# Patient Record
Sex: FEMALE | Race: WHITE | NOT HISPANIC OR LATINO | Employment: OTHER | URBAN - METROPOLITAN AREA
[De-identification: names, ages, dates, MRNs, and addresses within clinical notes are randomized per-mention and may not be internally consistent; named-entity substitution may affect disease eponyms.]

---

## 2017-03-09 ENCOUNTER — HOSPITAL ENCOUNTER (OUTPATIENT)
Dept: LAB | Facility: HOSPITAL | Age: 67
Discharge: HOME | End: 2017-03-09
Attending: INTERNAL MEDICINE | Admitting: INTERNAL MEDICINE

## 2017-03-09 LAB
ALBUMIN SERPL-MCNC: 3.7 G/DL (ref 3.5–4.8)
ALBUMIN/GLOB SERPL: 1.3 {RATIO} (ref 1–1.7)
ALP SERPL-CCNC: 56 IU/L (ref 32–91)
ALT SERPL-CCNC: 13 IU/L (ref 14–54)
ANION GAP SERPL CALC-SCNC: 11.5 MMOL/L (ref 10–20)
AST SERPL-CCNC: 16 IU/L (ref 15–41)
BASOPHILS # BLD AUTO: 0.1 10*3/UL (ref 0–0.2)
BASOPHILS NFR BLD AUTO: 1 % (ref 0–2)
BILIRUB SERPL-MCNC: 0.8 MG/DL (ref 0.3–1.2)
BILIRUB UR QL STRIP: NEGATIVE MG/DL
BUN SERPL-MCNC: 15 MG/DL (ref 8–20)
BUN/CREAT SERPL: 16.7 (ref 5.4–26.2)
CALCIUM SERPL-MCNC: 9.5 MG/DL (ref 8.9–10.3)
CASTS URNS QL MICRO: ABNORMAL /[LPF]
CHLORIDE SERPL-SCNC: 106 MMOL/L (ref 101–111)
COLOR UR: YELLOW
CONV BACTERIA IN URINE MICRO: NEGATIVE
CONV CLARITY OF URINE: CLEAR
CONV CO2: 26 MMOL/L (ref 22–32)
CONV HYALINE CASTS IN URINE MICRO: 1 /[LPF] (ref 0–5)
CONV PROTEIN IN URINE BY AUTOMATED TEST STRIP: NEGATIVE MG/DL
CONV SMALL ROUND CELLS: ABNORMAL /[HPF]
CONV TOTAL PROTEIN: 6.5 G/DL (ref 6.1–7.9)
CONV UROBILINOGEN IN URINE BY AUTOMATED TEST STRIP: 0.2 MG/DL
CREAT UR-MCNC: 0.9 MG/DL (ref 0.4–1)
CRP SERPL-MCNC: 0.08 MG/DL (ref 0–0.7)
CULTURE INDICATED?: ABNORMAL
DIFFERENTIAL METHOD BLD: (no result)
EOSINOPHIL # BLD AUTO: 0.3 10*3/UL (ref 0–0.3)
EOSINOPHIL # BLD AUTO: 4 % (ref 0–3)
ERYTHROCYTE [DISTWIDTH] IN BLOOD BY AUTOMATED COUNT: 13.6 % (ref 11.5–14.5)
ERYTHROCYTE [SEDIMENTATION RATE] IN BLOOD BY WESTERGREN METHOD: 18 MM/HR (ref 0–30)
GLOBULIN UR ELPH-MCNC: 2.8 G/DL (ref 2.5–3.8)
GLUCOSE SERPL-MCNC: 111 MG/DL (ref 65–99)
GLUCOSE UR QL: NEGATIVE MG/DL
HCT VFR BLD AUTO: 38.7 % (ref 35–49)
HGB BLD-MCNC: 13.2 G/DL (ref 12–15)
HGB UR QL STRIP: NEGATIVE
KETONES UR QL STRIP: NEGATIVE MG/DL
LEUKOCYTE ESTERASE UR QL STRIP: ABNORMAL
LYMPHOCYTES # BLD AUTO: 2.1 10*3/UL (ref 0.8–4.8)
LYMPHOCYTES NFR BLD AUTO: 26 % (ref 18–42)
MCH RBC QN AUTO: 29.4 PG (ref 26–32)
MCHC RBC AUTO-ENTMCNC: 34.1 G/DL (ref 32–36)
MCV RBC AUTO: 86 FL (ref 80–94)
MONOCYTES # BLD AUTO: 0.8 10*3/UL (ref 0.1–1.3)
MONOCYTES NFR BLD AUTO: 9 % (ref 2–11)
NEUTROPHILS # BLD AUTO: 4.8 10*3/UL (ref 2.3–8.6)
NEUTROPHILS NFR BLD AUTO: 60 % (ref 50–75)
NITRITE UR QL STRIP: NEGATIVE
NRBC BLD AUTO-RTO: 0 /100{WBCS}
NRBC/RBC NFR BLD MANUAL: 0 10*3/UL
PH UR STRIP.AUTO: 5.5 [PH] (ref 4.5–8)
PLATELET # BLD AUTO: 234 10*3/UL (ref 150–450)
PMV BLD AUTO: 8.3 FL (ref 7.4–10.4)
POTASSIUM SERPL-SCNC: 4.5 MMOL/L (ref 3.6–5.1)
RBC # BLD AUTO: 4.5 10*6/UL (ref 4–5.4)
RBC #/AREA URNS HPF: 1 /[HPF] (ref 0–3)
SODIUM SERPL-SCNC: 139 MMOL/L (ref 136–144)
SP GR UR: 1.02 (ref 1–1.03)
SPERM URNS QL MICRO: ABNORMAL /[HPF]
SQUAMOUS SPT QL MICRO: 2 /[HPF] (ref 0–5)
UNIDENT CRYS URNS QL MICRO: ABNORMAL /[HPF]
WBC # BLD AUTO: 8.1 10*3/UL (ref 4.5–11.5)
WBC #/AREA URNS HPF: 3 /[HPF] (ref 0–5)
YEAST SPEC QL WET PREP: ABNORMAL /[HPF]

## 2017-06-07 ENCOUNTER — HOSPITAL ENCOUNTER (OUTPATIENT)
Dept: LAB | Facility: HOSPITAL | Age: 67
Discharge: HOME | End: 2017-06-07
Attending: INTERNAL MEDICINE | Admitting: INTERNAL MEDICINE

## 2017-06-09 LAB — PTH-INTACT SERPL-MCNC: 43 PG/ML (ref 11–72)

## 2017-07-26 ENCOUNTER — HOSPITAL ENCOUNTER (OUTPATIENT)
Dept: MAMMOGRAPHY | Facility: HOSPITAL | Age: 67
Discharge: HOME | End: 2017-07-26
Attending: INTERNAL MEDICINE | Admitting: INTERNAL MEDICINE

## 2017-08-16 ENCOUNTER — HOSPITAL ENCOUNTER (OUTPATIENT)
Dept: SLEEP MEDICINE | Facility: HOSPITAL | Age: 67
Discharge: HOME | End: 2017-08-16
Attending: INTERNAL MEDICINE | Admitting: INTERNAL MEDICINE

## 2018-01-01 ENCOUNTER — HOSPITAL ENCOUNTER (OUTPATIENT)
Dept: MRI IMAGING | Facility: HOSPITAL | Age: 68
Discharge: HOME | End: 2018-12-21
Attending: DERMATOLOGY | Admitting: DERMATOLOGY

## 2018-01-01 ENCOUNTER — HOSPITAL ENCOUNTER (OUTPATIENT)
Dept: SLEEP MEDICINE | Facility: HOSPITAL | Age: 68
Discharge: HOME | End: 2018-08-27
Attending: INTERNAL MEDICINE | Admitting: INTERNAL MEDICINE

## 2018-01-01 ENCOUNTER — HOSPITAL ENCOUNTER (OUTPATIENT)
Dept: ULTRASOUND IMAGING | Facility: HOSPITAL | Age: 68
Discharge: HOME | End: 2018-10-19
Attending: DERMATOLOGY | Admitting: DERMATOLOGY

## 2018-01-01 LAB — CREAT BLDA-MCNC: 1 MG/DL (ref 0.6–1.3)

## 2018-02-21 ENCOUNTER — HOSPITAL ENCOUNTER (OUTPATIENT)
Dept: SLEEP MEDICINE | Facility: HOSPITAL | Age: 68
Discharge: HOME | End: 2018-02-21
Attending: INTERNAL MEDICINE | Admitting: INTERNAL MEDICINE

## 2019-01-01 ENCOUNTER — HOSPITAL ENCOUNTER (INPATIENT)
Facility: HOSPITAL | Age: 69
LOS: 20 days | End: 2019-08-02
Attending: EMERGENCY MEDICINE | Admitting: INTERNAL MEDICINE

## 2019-01-01 ENCOUNTER — APPOINTMENT (OUTPATIENT)
Dept: GENERAL RADIOLOGY | Facility: HOSPITAL | Age: 69
End: 2019-01-01

## 2019-01-01 ENCOUNTER — PREP FOR SURGERY (OUTPATIENT)
Dept: OTHER | Facility: HOSPITAL | Age: 69
End: 2019-01-01

## 2019-01-01 ENCOUNTER — TREATMENT (OUTPATIENT)
Dept: INTERNAL MEDICINE | Facility: HOSPITAL | Age: 69
End: 2019-01-01

## 2019-01-01 ENCOUNTER — HOSPITAL ENCOUNTER (OUTPATIENT)
Dept: GENERAL RADIOLOGY | Facility: HOSPITAL | Age: 69
Discharge: HOME | End: 2019-07-05
Admitting: FAMILY MEDICINE

## 2019-01-01 ENCOUNTER — TRANSCRIBE ORDERS (OUTPATIENT)
Dept: ADMINISTRATIVE | Facility: HOSPITAL | Age: 69
End: 2019-01-01

## 2019-01-01 ENCOUNTER — ANESTHESIA EVENT (OUTPATIENT)
Dept: GASTROENTEROLOGY | Facility: HOSPITAL | Age: 69
End: 2019-01-01

## 2019-01-01 ENCOUNTER — HOSPITAL ENCOUNTER (OUTPATIENT)
Dept: HOME HEALTH SERVICES | Facility: HOME HEALTHCARE | Age: 69
Setting detail: SPECIMEN
Discharge: HOME | End: 2019-06-07
Attending: ORTHOPAEDIC SURGERY | Admitting: ORTHOPAEDIC SURGERY

## 2019-01-01 ENCOUNTER — HOSPITAL ENCOUNTER (INPATIENT)
Dept: CARDIOLOGY | Facility: HOSPITAL | Age: 69
Discharge: HOME | End: 2019-07-14

## 2019-01-01 ENCOUNTER — HOSPITAL ENCOUNTER (OUTPATIENT)
Dept: ORTHOPEDIC SURGERY | Facility: CLINIC | Age: 69
Discharge: HOME | End: 2019-06-13
Attending: PHYSICIAN ASSISTANT | Admitting: PHYSICIAN ASSISTANT

## 2019-01-01 ENCOUNTER — LAB (OUTPATIENT)
Dept: LAB | Facility: HOSPITAL | Age: 69
End: 2019-01-01

## 2019-01-01 ENCOUNTER — CONVERSION ENCOUNTER (OUTPATIENT)
Dept: ORTHOPEDIC SURGERY | Facility: CLINIC | Age: 69
End: 2019-01-01

## 2019-01-01 ENCOUNTER — OFFICE VISIT (OUTPATIENT)
Dept: ORTHOPEDIC SURGERY | Facility: CLINIC | Age: 69
End: 2019-01-01

## 2019-01-01 ENCOUNTER — HOSPITAL ENCOUNTER (OUTPATIENT)
Dept: CT IMAGING | Facility: HOSPITAL | Age: 69
Discharge: HOME | End: 2019-06-17
Admitting: FAMILY MEDICINE

## 2019-01-01 ENCOUNTER — ANESTHESIA (OUTPATIENT)
Dept: GASTROENTEROLOGY | Facility: HOSPITAL | Age: 69
End: 2019-01-01

## 2019-01-01 ENCOUNTER — HOSPITAL ENCOUNTER (OUTPATIENT)
Dept: LAB | Facility: HOSPITAL | Age: 69
Discharge: HOME | End: 2019-06-13
Attending: INTERNAL MEDICINE | Admitting: INTERNAL MEDICINE

## 2019-01-01 ENCOUNTER — DOCUMENTATION (OUTPATIENT)
Dept: INTERNAL MEDICINE | Facility: HOSPITAL | Age: 69
End: 2019-01-01

## 2019-01-01 ENCOUNTER — APPOINTMENT (OUTPATIENT)
Dept: INTERVENTIONAL RADIOLOGY/VASCULAR | Facility: HOSPITAL | Age: 69
End: 2019-01-01

## 2019-01-01 ENCOUNTER — HOSPITAL ENCOUNTER (OUTPATIENT)
Dept: LAB | Facility: HOSPITAL | Age: 69
Setting detail: SPECIMEN
Discharge: HOME | End: 2019-05-27
Attending: PHYSICIAN ASSISTANT | Admitting: PHYSICIAN ASSISTANT

## 2019-01-01 ENCOUNTER — APPOINTMENT (OUTPATIENT)
Dept: CT IMAGING | Facility: HOSPITAL | Age: 69
End: 2019-01-01

## 2019-01-01 ENCOUNTER — HOSPITAL ENCOUNTER (OUTPATIENT)
Dept: ORTHOPEDIC SURGERY | Facility: CLINIC | Age: 69
Discharge: HOME | End: 2019-05-07
Attending: ORTHOPAEDIC SURGERY | Admitting: ORTHOPAEDIC SURGERY

## 2019-01-01 ENCOUNTER — APPOINTMENT (OUTPATIENT)
Dept: ULTRASOUND IMAGING | Facility: HOSPITAL | Age: 69
End: 2019-01-01

## 2019-01-01 VITALS
WEIGHT: 201.94 LBS | HEIGHT: 65 IN | SYSTOLIC BLOOD PRESSURE: 178 MMHG | DIASTOLIC BLOOD PRESSURE: 86 MMHG | OXYGEN SATURATION: 96 % | HEART RATE: 127 BPM | TEMPERATURE: 97.2 F | RESPIRATION RATE: 24 BRPM | BODY MASS INDEX: 33.65 KG/M2

## 2019-01-01 VITALS
HEIGHT: 66 IN | DIASTOLIC BLOOD PRESSURE: 69 MMHG | WEIGHT: 216 LBS | HEART RATE: 68 BPM | SYSTOLIC BLOOD PRESSURE: 129 MMHG | BODY MASS INDEX: 34.72 KG/M2

## 2019-01-01 VITALS
BODY MASS INDEX: 34.72 KG/M2 | HEART RATE: 61 BPM | HEIGHT: 66 IN | WEIGHT: 216 LBS | SYSTOLIC BLOOD PRESSURE: 172 MMHG | DIASTOLIC BLOOD PRESSURE: 72 MMHG

## 2019-01-01 VITALS — WEIGHT: 227 LBS | HEIGHT: 67 IN | BODY MASS INDEX: 35.63 KG/M2

## 2019-01-01 VITALS
DIASTOLIC BLOOD PRESSURE: 66 MMHG | WEIGHT: 217 LBS | BODY MASS INDEX: 36.15 KG/M2 | HEIGHT: 65 IN | SYSTOLIC BLOOD PRESSURE: 146 MMHG

## 2019-01-01 VITALS
BODY MASS INDEX: 36 KG/M2 | HEIGHT: 66 IN | DIASTOLIC BLOOD PRESSURE: 76 MMHG | HEART RATE: 62 BPM | SYSTOLIC BLOOD PRESSURE: 146 MMHG | WEIGHT: 224 LBS

## 2019-01-01 DIAGNOSIS — J96.01 ACUTE HYPOXEMIC RESPIRATORY FAILURE (HCC): ICD-10-CM

## 2019-01-01 DIAGNOSIS — Z96.641 HX OF TOTAL HIP ARTHROPLASTY, RIGHT: Primary | ICD-10-CM

## 2019-01-01 DIAGNOSIS — R06.02 SOB (SHORTNESS OF BREATH): Primary | ICD-10-CM

## 2019-01-01 DIAGNOSIS — E66.9 OBESITY (BMI 30-39.9): ICD-10-CM

## 2019-01-01 DIAGNOSIS — R89.9 ABNORMAL LABORATORY TEST: ICD-10-CM

## 2019-01-01 DIAGNOSIS — R06.00 DYSPNEA, UNSPECIFIED TYPE: ICD-10-CM

## 2019-01-01 DIAGNOSIS — I20.9 ANGINA PECTORIS (HCC): ICD-10-CM

## 2019-01-01 DIAGNOSIS — J18.9 PNEUMONIA DUE TO INFECTIOUS ORGANISM, UNSPECIFIED LATERALITY, UNSPECIFIED PART OF LUNG: Primary | ICD-10-CM

## 2019-01-01 DIAGNOSIS — J18.9 PNEUMONIA DUE TO INFECTIOUS ORGANISM, UNSPECIFIED LATERALITY, UNSPECIFIED PART OF LUNG: ICD-10-CM

## 2019-01-01 DIAGNOSIS — D64.9 ANEMIA, UNSPECIFIED TYPE: Primary | ICD-10-CM

## 2019-01-01 DIAGNOSIS — D64.9 ANEMIA, UNSPECIFIED TYPE: ICD-10-CM

## 2019-01-01 DIAGNOSIS — T81.31XA DISRUPTION OF EXTERNAL SURGICAL WOUND, INITIAL ENCOUNTER: ICD-10-CM

## 2019-01-01 DIAGNOSIS — A41.9 SEVERE SEPSIS (HCC): Primary | ICD-10-CM

## 2019-01-01 DIAGNOSIS — R65.20 SEVERE SEPSIS (HCC): Primary | ICD-10-CM

## 2019-01-01 DIAGNOSIS — J81.0 ACUTE PULMONARY EDEMA (HCC): ICD-10-CM

## 2019-01-01 DIAGNOSIS — R06.00 DYSPNEA, UNSPECIFIED TYPE: Primary | ICD-10-CM

## 2019-01-01 DIAGNOSIS — T81.31XD DISRUPTION OF EXTERNAL SURGICAL WOUND, SUBSEQUENT ENCOUNTER: ICD-10-CM

## 2019-01-01 LAB
A FLAVUS AB SER QL ID: NEGATIVE
A FLAVUS AB SER QL ID: NEGATIVE
A FUMIGATUS AB SER QL ID: NEGATIVE
A FUMIGATUS AB SER QL ID: NEGATIVE
A NIGER AB SER QL ID: NEGATIVE
A NIGER AB SER QL ID: NEGATIVE
ABO + RH BLD: NORMAL
ABO GROUP BLD: NORMAL
ALBUMIN SERPL-MCNC: 2.4 G/DL (ref 3.5–4.8)
ALBUMIN SERPL-MCNC: 2.4 G/DL (ref 3.5–4.8)
ALBUMIN SERPL-MCNC: 2.5 G/DL (ref 3.5–4.8)
ALBUMIN SERPL-MCNC: 2.8 G/DL (ref 3.5–4.8)
ALBUMIN SERPL-MCNC: 2.8 G/DL (ref 3.5–4.8)
ALBUMIN SERPL-MCNC: 2.9 G/DL (ref 3.5–4.8)
ALBUMIN SERPL-MCNC: 3 G/DL (ref 3.5–4.8)
ALBUMIN SERPL-MCNC: 3.1 G/DL (ref 3.5–4.8)
ALBUMIN SERPL-MCNC: 3.2 G/DL (ref 3.5–4.8)
ALBUMIN SERPL-MCNC: 3.2 G/DL (ref 3.5–4.8)
ALBUMIN SERPL-MCNC: 3.3 G/DL (ref 3.5–4.8)
ALBUMIN SERPL-MCNC: 3.8 G/DL (ref 3.5–4.8)
ALBUMIN/GLOB SERPL: 0.8 G/DL (ref 1–1.7)
ALBUMIN/GLOB SERPL: 0.9 G/DL (ref 1–1.7)
ALBUMIN/GLOB SERPL: 1 G/DL (ref 1–1.7)
ALBUMIN/GLOB SERPL: 1.1 G/DL (ref 1–1.7)
ALBUMIN/GLOB SERPL: 1.3 G/DL (ref 1–1.7)
ALBUMIN/GLOB SERPL: 1.5 G/DL (ref 1–1.7)
ALBUMIN/GLOB SERPL: 1.6 G/DL (ref 1–1.7)
ALBUMIN/GLOB SERPL: 1.7 G/DL (ref 1–1.7)
ALBUMIN/GLOB SERPL: 1.8 G/DL (ref 1–1.7)
ALBUMIN/GLOB SERPL: 1.9 G/DL (ref 1–1.7)
ALBUMIN/GLOB SERPL: 2.1 G/DL (ref 1–1.7)
ALBUMIN/GLOB SERPL: 2.1 G/DL (ref 1–1.7)
ALBUMIN/GLOB SERPL: 2.5 G/DL (ref 1–1.7)
ALP SERPL-CCNC: 38 U/L (ref 32–91)
ALP SERPL-CCNC: 40 U/L (ref 32–91)
ALP SERPL-CCNC: 44 U/L (ref 32–91)
ALP SERPL-CCNC: 45 U/L (ref 32–91)
ALP SERPL-CCNC: 47 U/L (ref 32–91)
ALP SERPL-CCNC: 49 U/L (ref 32–91)
ALP SERPL-CCNC: 50 U/L (ref 32–91)
ALP SERPL-CCNC: 52 U/L (ref 32–91)
ALP SERPL-CCNC: 55 U/L (ref 32–91)
ALP SERPL-CCNC: 58 U/L (ref 32–91)
ALP SERPL-CCNC: 62 U/L (ref 32–91)
ALP SERPL-CCNC: 77 U/L (ref 32–91)
ALT SERPL W P-5'-P-CCNC: 13 U/L (ref 14–54)
ALT SERPL W P-5'-P-CCNC: 16 U/L (ref 14–54)
ALT SERPL W P-5'-P-CCNC: 16 U/L (ref 14–54)
ALT SERPL W P-5'-P-CCNC: 17 U/L (ref 14–54)
ALT SERPL W P-5'-P-CCNC: 18 U/L (ref 14–54)
ALT SERPL W P-5'-P-CCNC: 19 U/L (ref 14–54)
ALT SERPL W P-5'-P-CCNC: 19 U/L (ref 14–54)
ALT SERPL W P-5'-P-CCNC: 20 U/L (ref 14–54)
ALT SERPL W P-5'-P-CCNC: 22 U/L (ref 14–54)
ALT SERPL W P-5'-P-CCNC: 25 U/L (ref 14–54)
ALT SERPL W P-5'-P-CCNC: 25 U/L (ref 14–54)
ALT SERPL W P-5'-P-CCNC: 26 U/L (ref 14–54)
ALT SERPL W P-5'-P-CCNC: 27 U/L (ref 14–54)
ALT SERPL W P-5'-P-CCNC: 27 U/L (ref 14–54)
ALT SERPL W P-5'-P-CCNC: 30 U/L (ref 14–54)
ALT SERPL W P-5'-P-CCNC: 31 U/L (ref 14–54)
ANION GAP SERPL CALC-SCNC: 12.2 MMOL/L (ref 10–20)
ANION GAP SERPL CALC-SCNC: 14.2 MMOL/L (ref 10–20)
ANION GAP SERPL CALCULATED.3IONS-SCNC: 10.5 MMOL/L (ref 5–15)
ANION GAP SERPL CALCULATED.3IONS-SCNC: 11.3 MMOL/L (ref 5–15)
ANION GAP SERPL CALCULATED.3IONS-SCNC: 11.4 MMOL/L (ref 5–15)
ANION GAP SERPL CALCULATED.3IONS-SCNC: 11.5 MMOL/L (ref 5–15)
ANION GAP SERPL CALCULATED.3IONS-SCNC: 11.8 MMOL/L (ref 5–15)
ANION GAP SERPL CALCULATED.3IONS-SCNC: 12.5 MMOL/L (ref 5–15)
ANION GAP SERPL CALCULATED.3IONS-SCNC: 12.5 MMOL/L (ref 5–15)
ANION GAP SERPL CALCULATED.3IONS-SCNC: 12.7 MMOL/L (ref 5–15)
ANION GAP SERPL CALCULATED.3IONS-SCNC: 13.1 MMOL/L (ref 5–15)
ANION GAP SERPL CALCULATED.3IONS-SCNC: 13.6 MMOL/L (ref 5–15)
ANION GAP SERPL CALCULATED.3IONS-SCNC: 14.5 MMOL/L (ref 5–15)
ANION GAP SERPL CALCULATED.3IONS-SCNC: 14.6 MMOL/L (ref 5–15)
ANION GAP SERPL CALCULATED.3IONS-SCNC: 15.3 MMOL/L (ref 5–15)
ANION GAP SERPL CALCULATED.3IONS-SCNC: 15.7 MMOL/L (ref 5–15)
ANION GAP SERPL CALCULATED.3IONS-SCNC: 15.7 MMOL/L (ref 5–15)
ANION GAP SERPL CALCULATED.3IONS-SCNC: 15.9 MMOL/L (ref 5–15)
ANION GAP SERPL CALCULATED.3IONS-SCNC: 16.4 MMOL/L (ref 5–15)
ANION GAP SERPL CALCULATED.3IONS-SCNC: 16.9 MMOL/L (ref 5–15)
ANION GAP SERPL CALCULATED.3IONS-SCNC: 17.1 MMOL/L (ref 5–15)
ANION GAP SERPL CALCULATED.3IONS-SCNC: 17.7 MMOL/L (ref 5–15)
ANION GAP SERPL CALCULATED.3IONS-SCNC: 17.8 MMOL/L (ref 5–15)
ANISOCYTOSIS BLD QL: ABNORMAL
APTT PPP: 21.3 SECONDS (ref 24–31)
APTT PPP: 21.7 SECONDS (ref 24–31)
APTT PPP: 22.7 SECONDS (ref 24–31)
APTT PPP: 22.9 SECONDS (ref 24–31)
APTT PPP: 22.9 SECONDS (ref 24–31)
APTT PPP: 23.5 SECONDS (ref 24–31)
APTT PPP: 23.7 SECONDS (ref 24–31)
APTT PPP: 23.9 SECONDS (ref 24–31)
APTT PPP: 24.3 SECONDS (ref 24–31)
APTT PPP: 28.5 SECONDS (ref 24–31)
ARTERIAL PATENCY WRIST A: ABNORMAL
ARTERIAL PATENCY WRIST A: NORMAL
ARTERIAL PATENCY WRIST A: POSITIVE
AST SERPL-CCNC: 18 U/L (ref 15–41)
AST SERPL-CCNC: 20 U/L (ref 15–41)
AST SERPL-CCNC: 21 U/L (ref 15–41)
AST SERPL-CCNC: 21 U/L (ref 15–41)
AST SERPL-CCNC: 22 U/L (ref 15–41)
AST SERPL-CCNC: 23 U/L (ref 15–41)
AST SERPL-CCNC: 24 U/L (ref 15–41)
AST SERPL-CCNC: 26 U/L (ref 15–41)
AST SERPL-CCNC: 26 U/L (ref 15–41)
AST SERPL-CCNC: 29 U/L (ref 15–41)
AST SERPL-CCNC: 32 U/L (ref 15–41)
AST SERPL-CCNC: 33 U/L (ref 15–41)
AST SERPL-CCNC: 34 U/L (ref 15–41)
AST SERPL-CCNC: 38 U/L (ref 15–41)
AST SERPL-CCNC: 38 U/L (ref 15–41)
AST SERPL-CCNC: 45 U/L (ref 15–41)
ATMOSPHERIC PRESS: ABNORMAL MMHG
ATMOSPHERIC PRESS: NORMAL MMHG
B DERMAT AB TITR SER: NEGATIVE {TITER}
B DERMAT AB TITR SER: NEGATIVE {TITER}
B PERT DNA SPEC QL NAA+PROBE: NOT DETECTED
B PERT DNA SPEC QL NAA+PROBE: NOT DETECTED
BACTERIA SPEC AEROBE CULT: NORMAL
BACTERIA UR QL AUTO: ABNORMAL /HPF
BASE EXCESS BLDA CALC-SCNC: -1.9 MMOL/L (ref 0–3)
BASE EXCESS BLDA CALC-SCNC: -1.9 MMOL/L (ref 0–3)
BASE EXCESS BLDA CALC-SCNC: -2.2 MMOL/L (ref 0–3)
BASE EXCESS BLDA CALC-SCNC: -3.9 MMOL/L (ref 0–3)
BASE EXCESS BLDA CALC-SCNC: -4.2 MMOL/L (ref 0–3)
BASE EXCESS BLDA CALC-SCNC: -4.6 MMOL/L (ref 0–3)
BASE EXCESS BLDA CALC-SCNC: 18.9 MMOL/L (ref 0–3)
BASE EXCESS BLDA CALC-SCNC: 4 MMOL/L (ref 0–3)
BASE EXCESS BLDA CALC-SCNC: 4.7 MMOL/L (ref 0–3)
BASE EXCESS BLDA CALC-SCNC: 5.7 MMOL/L (ref 0–3)
BASE EXCESS BLDA CALC-SCNC: NORMAL MMOL/L (ref 0–3)
BASOPHILS # BLD AUTO: 0 10*3/MM3 (ref 0–0.2)
BASOPHILS # BLD AUTO: 0.1 10*3/MM3 (ref 0–0.2)
BASOPHILS NFR BLD AUTO: 0 % (ref 0–1.5)
BASOPHILS NFR BLD AUTO: 0.1 % (ref 0–1.5)
BASOPHILS NFR BLD AUTO: 0.2 % (ref 0–1.5)
BASOPHILS NFR BLD AUTO: 0.3 % (ref 0–1.5)
BASOPHILS NFR BLD AUTO: 0.4 % (ref 0–1.5)
BASOPHILS NFR BLD AUTO: 0.6 % (ref 0–1.5)
BASOPHILS NFR BLD AUTO: 0.8 % (ref 0–1.5)
BDY SITE: ABNORMAL
BDY SITE: NORMAL
BH BB BLOOD EXPIRATION DATE: NORMAL
BH BB BLOOD TYPE BARCODE: 600
BH BB BLOOD TYPE BARCODE: 6200
BH BB BLOOD TYPE BARCODE: NORMAL
BH BB DISPENSE STATUS: NORMAL
BH BB PRODUCT CODE: NORMAL
BH BB UNIT NUMBER: NORMAL
BH CV ECHO MEAS - ACS: 1.7 CM
BH CV ECHO MEAS - AO MAX PG (FULL): 3.5 MMHG
BH CV ECHO MEAS - AO MAX PG: 9.7 MMHG
BH CV ECHO MEAS - AO MEAN PG (FULL): 2.3 MMHG
BH CV ECHO MEAS - AO MEAN PG: 5.5 MMHG
BH CV ECHO MEAS - AO ROOT AREA (BSA CORRECTED): 1.6
BH CV ECHO MEAS - AO ROOT AREA: 8.8 CM^2
BH CV ECHO MEAS - AO ROOT DIAM: 3.3 CM
BH CV ECHO MEAS - AO V2 MAX: 155.8 CM/SEC
BH CV ECHO MEAS - AO V2 MEAN: 112.6 CM/SEC
BH CV ECHO MEAS - AO V2 VTI: 36.4 CM
BH CV ECHO MEAS - AORTIC HR: 69.8 BPM
BH CV ECHO MEAS - AORTIC R-R: 0.86 SEC
BH CV ECHO MEAS - AVA(I,A): 2.9 CM^2
BH CV ECHO MEAS - AVA(I,D): 2.9 CM^2
BH CV ECHO MEAS - AVA(V,A): 2.9 CM^2
BH CV ECHO MEAS - AVA(V,D): 2.9 CM^2
BH CV ECHO MEAS - BSA(HAYCOCK): 2.2 M^2
BH CV ECHO MEAS - BSA: 2 M^2
BH CV ECHO MEAS - BZI_BMI: 36.1 KILOGRAMS/M^2
BH CV ECHO MEAS - BZI_METRIC_HEIGHT: 165.1 CM
BH CV ECHO MEAS - BZI_METRIC_WEIGHT: 98.4 KG
BH CV ECHO MEAS - CI(AO): 10.9 L/MIN/M^2
BH CV ECHO MEAS - CI(LVOT): 3.6 L/MIN/M^2
BH CV ECHO MEAS - CO(AO): 22.3 L/MIN
BH CV ECHO MEAS - CO(LVOT): 7.3 L/MIN
BH CV ECHO MEAS - EDV(CUBED): 128.3 ML
BH CV ECHO MEAS - EDV(MOD-SP4): 108 ML
BH CV ECHO MEAS - EDV(TEICH): 120.7 ML
BH CV ECHO MEAS - EF(CUBED): 54.4 %
BH CV ECHO MEAS - EF(MOD-BP): 68 %
BH CV ECHO MEAS - EF(MOD-SP4): 68.1 %
BH CV ECHO MEAS - EF(TEICH): 46 %
BH CV ECHO MEAS - ESV(CUBED): 58.5 ML
BH CV ECHO MEAS - ESV(MOD-SP4): 34.5 ML
BH CV ECHO MEAS - ESV(TEICH): 65.2 ML
BH CV ECHO MEAS - FS: 23 %
BH CV ECHO MEAS - IVS/LVPW: 0.86
BH CV ECHO MEAS - IVSD: 0.77 CM
BH CV ECHO MEAS - LA DIMENSION(2D): 4.1 CM
BH CV ECHO MEAS - LV DIASTOLIC VOL/BSA (35-75): 52.7 ML/M^2
BH CV ECHO MEAS - LV MASS(C)D: 146.2 GRAMS
BH CV ECHO MEAS - LV MASS(C)DI: 71.4 GRAMS/M^2
BH CV ECHO MEAS - LV MAX PG: 6.2 MMHG
BH CV ECHO MEAS - LV MEAN PG: 3.2 MMHG
BH CV ECHO MEAS - LV SYSTOLIC VOL/BSA (12-30): 16.8 ML/M^2
BH CV ECHO MEAS - LV V1 MAX: 124.5 CM/SEC
BH CV ECHO MEAS - LV V1 MEAN: 82.2 CM/SEC
BH CV ECHO MEAS - LV V1 VTI: 28.5 CM
BH CV ECHO MEAS - LVIDD: 5 CM
BH CV ECHO MEAS - LVIDS: 3.9 CM
BH CV ECHO MEAS - LVOT AREA: 3.7 CM^2
BH CV ECHO MEAS - LVOT DIAM: 2.2 CM
BH CV ECHO MEAS - LVPWD: 0.9 CM
BH CV ECHO MEAS - MR MAX PG: 58.3 MMHG
BH CV ECHO MEAS - MR MAX VEL: 380.3 CM/SEC
BH CV ECHO MEAS - MV A MAX VEL: 48.6 CM/SEC
BH CV ECHO MEAS - MV DEC SLOPE: 456.7 CM/SEC^2
BH CV ECHO MEAS - MV DEC TIME: 0.23 SEC
BH CV ECHO MEAS - MV E MAX VEL: 106.4 CM/SEC
BH CV ECHO MEAS - MV E/A: 2.2
BH CV ECHO MEAS - MV MAX PG: 5.6 MMHG
BH CV ECHO MEAS - MV MEAN PG: 2 MMHG
BH CV ECHO MEAS - MV V2 MAX: 118.6 CM/SEC
BH CV ECHO MEAS - MV V2 MEAN: 65 CM/SEC
BH CV ECHO MEAS - MV V2 VTI: 32.4 CM
BH CV ECHO MEAS - MVA(VTI): 3.2 CM^2
BH CV ECHO MEAS - PA MAX PG (FULL): 2.4 MMHG
BH CV ECHO MEAS - PA MAX PG: 5.3 MMHG
BH CV ECHO MEAS - PA V2 MAX: 114.9 CM/SEC
BH CV ECHO MEAS - PULM A REVS DUR: 0.11 SEC
BH CV ECHO MEAS - PULM A REVS VEL: 28.1 CM/SEC
BH CV ECHO MEAS - PULM DIAS VEL: 90.4 CM/SEC
BH CV ECHO MEAS - PULM S/D: 0.68
BH CV ECHO MEAS - PULM SYS VEL: 61.4 CM/SEC
BH CV ECHO MEAS - PVA(V,A): 1.7 CM^2
BH CV ECHO MEAS - PVA(V,D): 1.7 CM^2
BH CV ECHO MEAS - QP/QS: 0.45
BH CV ECHO MEAS - RAP SYSTOLE: 3 MMHG
BH CV ECHO MEAS - RV MAX PG: 2.9 MMHG
BH CV ECHO MEAS - RV MEAN PG: 1.5 MMHG
BH CV ECHO MEAS - RV V1 MAX: 85.7 CM/SEC
BH CV ECHO MEAS - RV V1 MEAN: 56.8 CM/SEC
BH CV ECHO MEAS - RV V1 VTI: 20.5 CM
BH CV ECHO MEAS - RVDD: 3.2 CM
BH CV ECHO MEAS - RVOT AREA: 2.3 CM^2
BH CV ECHO MEAS - RVOT DIAM: 1.7 CM
BH CV ECHO MEAS - RVSP: 27.7 MMHG
BH CV ECHO MEAS - SI(AO): 156.4 ML/M^2
BH CV ECHO MEAS - SI(CUBED): 34.1 ML/M^2
BH CV ECHO MEAS - SI(LVOT): 51 ML/M^2
BH CV ECHO MEAS - SI(MOD-SP4): 35.9 ML/M^2
BH CV ECHO MEAS - SI(TEICH): 27.1 ML/M^2
BH CV ECHO MEAS - SV(AO): 320.3 ML
BH CV ECHO MEAS - SV(CUBED): 69.8 ML
BH CV ECHO MEAS - SV(LVOT): 104.4 ML
BH CV ECHO MEAS - SV(MOD-SP4): 73.5 ML
BH CV ECHO MEAS - SV(RVOT): 47.3 ML
BH CV ECHO MEAS - SV(TEICH): 55.5 ML
BH CV ECHO MEAS - TR MAX VEL: 248.7 CM/SEC
BILIRUB SERPL-MCNC: 0.8 MG/DL (ref 0.3–1.2)
BILIRUB SERPL-MCNC: 0.8 MG/DL (ref 0.3–1.2)
BILIRUB SERPL-MCNC: 1 MG/DL (ref 0.3–1.2)
BILIRUB SERPL-MCNC: 1.1 MG/DL (ref 0.3–1.2)
BILIRUB SERPL-MCNC: 1.3 MG/DL (ref 0.3–1.2)
BILIRUB SERPL-MCNC: 1.4 MG/DL (ref 0.3–1.2)
BILIRUB SERPL-MCNC: 1.4 MG/DL (ref 0.3–1.2)
BILIRUB SERPL-MCNC: 1.5 MG/DL (ref 0.3–1.2)
BILIRUB SERPL-MCNC: 1.5 MG/DL (ref 0.3–1.2)
BILIRUB SERPL-MCNC: 1.8 MG/DL (ref 0.3–1.2)
BILIRUB SERPL-MCNC: 2.2 MG/DL (ref 0.3–1.2)
BILIRUB SERPL-MCNC: 2.3 MG/DL (ref 0.3–1.2)
BILIRUB SERPL-MCNC: 2.7 MG/DL (ref 0.3–1.2)
BILIRUB UR QL STRIP: NEGATIVE
BILIRUB UR QL STRIP: NEGATIVE MG/DL
BLD GP AB SCN SERPL QL: NEGATIVE
BNP SERPL-MCNC: 369 PG/ML
BNP SERPL-MCNC: 441 PG/ML
BUN BLD-MCNC: 21 MG/DL (ref 8–20)
BUN BLD-MCNC: 21 MG/DL (ref 8–20)
BUN BLD-MCNC: 23 MG/DL (ref 8–20)
BUN BLD-MCNC: 29 MG/DL (ref 8–20)
BUN BLD-MCNC: 33 MG/DL (ref 8–20)
BUN BLD-MCNC: 38 MG/DL (ref 8–20)
BUN BLD-MCNC: 38 MG/DL (ref 8–20)
BUN BLD-MCNC: 43 MG/DL (ref 8–20)
BUN BLD-MCNC: 45 MG/DL (ref 8–20)
BUN BLD-MCNC: 48 MG/DL (ref 8–20)
BUN BLD-MCNC: 52 MG/DL (ref 8–20)
BUN BLD-MCNC: 52 MG/DL (ref 8–20)
BUN BLD-MCNC: 54 MG/DL (ref 8–20)
BUN BLD-MCNC: 55 MG/DL (ref 8–20)
BUN BLD-MCNC: 56 MG/DL (ref 8–20)
BUN BLD-MCNC: 61 MG/DL (ref 8–20)
BUN BLD-MCNC: 62 MG/DL (ref 8–20)
BUN BLD-MCNC: 64 MG/DL (ref 8–20)
BUN BLD-MCNC: 67 MG/DL (ref 8–20)
BUN BLD-MCNC: 79 MG/DL (ref 8–20)
BUN BLD-MCNC: 92 MG/DL (ref 8–20)
BUN SERPL-MCNC: 10 MG/DL (ref 8–20)
BUN SERPL-MCNC: 12 MG/DL (ref 8–20)
BUN/CREAT SERPL: 10 (ref 5.4–26.2)
BUN/CREAT SERPL: 15 (ref 5.4–26.2)
BUN/CREAT SERPL: 19.2 (ref 5.4–26.2)
BUN/CREAT SERPL: 20.7 (ref 5.4–26.2)
BUN/CREAT SERPL: 21 (ref 5.4–26.2)
BUN/CREAT SERPL: 21 (ref 5.4–26.2)
BUN/CREAT SERPL: 22 (ref 5.4–26.2)
BUN/CREAT SERPL: 25.3 (ref 5.4–26.2)
BUN/CREAT SERPL: 26 (ref 5.4–26.2)
BUN/CREAT SERPL: 28.1 (ref 5.4–26.2)
BUN/CREAT SERPL: 29.2 (ref 5.4–26.2)
BUN/CREAT SERPL: 30.5 (ref 5.4–26.2)
BUN/CREAT SERPL: 30.6 (ref 5.4–26.2)
BUN/CREAT SERPL: 31.1 (ref 5.4–26.2)
BUN/CREAT SERPL: 31.7 (ref 5.4–26.2)
BUN/CREAT SERPL: 34.3 (ref 5.4–26.2)
BUN/CREAT SERPL: 36 (ref 5.4–26.2)
BUN/CREAT SERPL: 36.7 (ref 5.4–26.2)
BUN/CREAT SERPL: 38.8 (ref 5.4–26.2)
BUN/CREAT SERPL: 39.4 (ref 5.4–26.2)
BUN/CREAT SERPL: 39.5 (ref 5.4–26.2)
BUN/CREAT SERPL: 40 (ref 5.4–26.2)
BUN/CREAT SERPL: 46 (ref 5.4–26.2)
C PNEUM DNA NPH QL NAA+NON-PROBE: NOT DETECTED
C PNEUM DNA NPH QL NAA+NON-PROBE: NOT DETECTED
C-ANCA TITR SER IF: ABNORMAL TITER
CA-I BLDA-SCNC: 0.89 MMOL/L (ref 1.15–1.33)
CA-I SERPL ISE-MCNC: 1.09 MMOL/L (ref 1.2–1.3)
CA-I SERPL ISE-MCNC: 1.09 MMOL/L (ref 1.2–1.3)
CA-I SERPL ISE-MCNC: 1.11 MMOL/L (ref 1.2–1.3)
CA-I SERPL ISE-MCNC: 1.12 MMOL/L (ref 1.2–1.3)
CA-I SERPL ISE-MCNC: 1.15 MMOL/L (ref 1.2–1.3)
CA-I SERPL ISE-MCNC: 1.18 MMOL/L (ref 1.2–1.3)
CA-I SERPL ISE-MCNC: 1.18 MMOL/L (ref 1.2–1.3)
CA-I SERPL ISE-MCNC: 1.21 MMOL/L (ref 1.2–1.3)
CA-I SERPL ISE-MCNC: 1.23 MMOL/L (ref 1.2–1.3)
CALCIUM SERPL-MCNC: 8.6 MG/DL (ref 8.9–10.3)
CALCIUM SERPL-MCNC: 8.6 MG/DL (ref 8.9–10.3)
CALCIUM SPEC-SCNC: 7.6 MG/DL (ref 8.9–10.3)
CALCIUM SPEC-SCNC: 7.7 MG/DL (ref 8.9–10.3)
CALCIUM SPEC-SCNC: 7.8 MG/DL (ref 8.9–10.3)
CALCIUM SPEC-SCNC: 8 MG/DL (ref 8.9–10.3)
CALCIUM SPEC-SCNC: 8.2 MG/DL (ref 8.9–10.3)
CALCIUM SPEC-SCNC: 8.3 MG/DL (ref 8.9–10.3)
CALCIUM SPEC-SCNC: 8.3 MG/DL (ref 8.9–10.3)
CALCIUM SPEC-SCNC: 8.5 MG/DL (ref 8.9–10.3)
CALCIUM SPEC-SCNC: 8.6 MG/DL (ref 8.9–10.3)
CALCIUM SPEC-SCNC: 8.7 MG/DL (ref 8.9–10.3)
CALCIUM SPEC-SCNC: 9.1 MG/DL (ref 8.9–10.3)
CASTS URNS QL MICRO: NORMAL /[LPF]
CHLORIDE SERPL-SCNC: 100 MMOL/L (ref 101–111)
CHLORIDE SERPL-SCNC: 101 MMOL/L (ref 101–111)
CHLORIDE SERPL-SCNC: 103 MMOL/L (ref 101–111)
CHLORIDE SERPL-SCNC: 103 MMOL/L (ref 101–111)
CHLORIDE SERPL-SCNC: 105 MMOL/L (ref 101–111)
CHLORIDE SERPL-SCNC: 105 MMOL/L (ref 101–111)
CHLORIDE SERPL-SCNC: 109 MMOL/L (ref 101–111)
CHLORIDE SERPL-SCNC: 109 MMOL/L (ref 101–111)
CHLORIDE SERPL-SCNC: 110 MMOL/L (ref 101–111)
CHLORIDE SERPL-SCNC: 110 MMOL/L (ref 101–111)
CHLORIDE SERPL-SCNC: 111 MMOL/L (ref 101–111)
CHLORIDE SERPL-SCNC: 112 MMOL/L (ref 101–111)
CHLORIDE SERPL-SCNC: 114 MMOL/L (ref 101–111)
CHLORIDE SERPL-SCNC: 89 MMOL/L (ref 101–111)
CHLORIDE SERPL-SCNC: 94 MMOL/L (ref 101–111)
CHLORIDE SERPL-SCNC: 96 MMOL/L (ref 101–111)
CHLORIDE SERPL-SCNC: 96 MMOL/L (ref 101–111)
CHLORIDE SERPL-SCNC: 98 MMOL/L (ref 101–111)
CHLORIDE SERPL-SCNC: 98 MMOL/L (ref 101–111)
CHLORIDE SERPL-SCNC: 99 MMOL/L (ref 101–111)
CHLORIDE SERPL-SCNC: 99 MMOL/L (ref 101–111)
CK SERPL-CCNC: 20 U/L (ref 38–234)
CK SERPL-CCNC: 47 U/L (ref 20–180)
CLARITY UR: CLEAR
CMV DNA SPEC QL NAA+PROBE: NEGATIVE
CMV IGM SERPL IA-ACNC: <30 AU/ML (ref 0–29.9)
CO2 BLDA-SCNC: 20.5 MMOL/L (ref 22–29)
CO2 BLDA-SCNC: 21.3 MMOL/L (ref 22–29)
CO2 BLDA-SCNC: 21.5 MMOL/L (ref 22–29)
CO2 BLDA-SCNC: 23.4 MMOL/L (ref 22–29)
CO2 BLDA-SCNC: 23.4 MMOL/L (ref 22–29)
CO2 BLDA-SCNC: 23.7 MMOL/L (ref 22–29)
CO2 BLDA-SCNC: 29.8 MMOL/L (ref 22–29)
CO2 BLDA-SCNC: 30.4 MMOL/L (ref 22–29)
CO2 BLDA-SCNC: 30.5 MMOL/L (ref 22–29)
CO2 BLDA-SCNC: 45 MMOL/L (ref 22–29)
CO2 SERPL-SCNC: 18 MMOL/L (ref 22–32)
CO2 SERPL-SCNC: 19 MMOL/L (ref 22–32)
CO2 SERPL-SCNC: 20 MMOL/L (ref 22–32)
CO2 SERPL-SCNC: 21 MMOL/L (ref 22–32)
CO2 SERPL-SCNC: 21 MMOL/L (ref 22–32)
CO2 SERPL-SCNC: 22 MMOL/L (ref 22–32)
CO2 SERPL-SCNC: 24 MMOL/L (ref 22–32)
CO2 SERPL-SCNC: 25 MMOL/L (ref 22–32)
CO2 SERPL-SCNC: 26 MMOL/L (ref 22–32)
CO2 SERPL-SCNC: 26 MMOL/L (ref 22–32)
CO2 SERPL-SCNC: 27 MMOL/L (ref 22–32)
CO2 SERPL-SCNC: 27 MMOL/L (ref 22–32)
CO2 SERPL-SCNC: 28 MMOL/L (ref 22–32)
CO2 SERPL-SCNC: 28 MMOL/L (ref 22–32)
CO2 SERPL-SCNC: 31 MMOL/L (ref 22–32)
CO2 SERPL-SCNC: 31 MMOL/L (ref 22–32)
CO2 SERPL-SCNC: 33 MMOL/L (ref 22–32)
CO2 SERPL-SCNC: 35 MMOL/L (ref 22–32)
CO2 SERPL-SCNC: 39 MMOL/L (ref 22–32)
COLOR UR: YELLOW
COLOR UR: YELLOW
CONV BACTERIA IN URINE MICRO: NEGATIVE
CONV CLARITY OF URINE: CLEAR
CONV CO2: 24 MMOL/L (ref 22–32)
CONV CO2: 24 MMOL/L (ref 22–32)
CONV HYALINE CASTS IN URINE MICRO: 0 /[LPF] (ref 0–5)
CONV PROTEIN IN URINE BY AUTOMATED TEST STRIP: NEGATIVE MG/DL
CONV SMALL ROUND CELLS: NORMAL /[HPF]
CONV UROBILINOGEN IN URINE BY AUTOMATED TEST STRIP: 0.2 MG/DL
CREAT BLD-MCNC: 1 MG/DL (ref 0.4–1)
CREAT BLD-MCNC: 1 MG/DL (ref 0.4–1)
CREAT BLD-MCNC: 1.2 MG/DL (ref 0.4–1)
CREAT BLD-MCNC: 1.2 MG/DL (ref 0.4–1)
CREAT BLD-MCNC: 1.3 MG/DL (ref 0.4–1)
CREAT BLD-MCNC: 1.4 MG/DL (ref 0.4–1)
CREAT BLD-MCNC: 1.4 MG/DL (ref 0.4–1)
CREAT BLD-MCNC: 1.5 MG/DL (ref 0.4–1)
CREAT BLD-MCNC: 1.6 MG/DL (ref 0.4–1)
CREAT BLD-MCNC: 1.7 MG/DL (ref 0.4–1)
CREAT BLD-MCNC: 1.8 MG/DL (ref 0.4–1)
CREAT BLD-MCNC: 2 MG/DL (ref 0.4–1)
CREAT UR-MCNC: 0.8 MG/DL (ref 0.4–1)
CREAT UR-MCNC: 1 MG/DL (ref 0.4–1)
CULTURE INDICATED?: NORMAL
D DIMER PPP FEU-MCNC: 2.73 MCGFEU/ML (ref 0.17–0.59)
D DIMER PPP FEU-MCNC: 4.46 MCGFEU/ML (ref 0.17–0.59)
D-LACTATE SERPL-SCNC: 1.2 MMOL/L (ref 0.5–2.2)
D-LACTATE SERPL-SCNC: 1.3 MMOL/L (ref 0.5–2.2)
D-LACTATE SERPL-SCNC: 1.9 MMOL/L (ref 0.5–2)
D-LACTATE SERPL-SCNC: 2.5 MMOL/L (ref 0.5–2)
DACRYOCYTES BLD QL SMEAR: ABNORMAL
DACRYOCYTES BLD QL SMEAR: ABNORMAL
DEPRECATED RDW RBC AUTO: 43.3 FL (ref 37–54)
DEPRECATED RDW RBC AUTO: 44.2 FL (ref 37–54)
DEPRECATED RDW RBC AUTO: 44.6 FL (ref 37–54)
DEPRECATED RDW RBC AUTO: 44.6 FL (ref 37–54)
DEPRECATED RDW RBC AUTO: 45.1 FL (ref 37–54)
DEPRECATED RDW RBC AUTO: 45.5 FL (ref 37–54)
DEPRECATED RDW RBC AUTO: 45.9 FL (ref 37–54)
DEPRECATED RDW RBC AUTO: 45.9 FL (ref 37–54)
DEPRECATED RDW RBC AUTO: 46.4 FL (ref 37–54)
DEPRECATED RDW RBC AUTO: 46.4 FL (ref 37–54)
DEPRECATED RDW RBC AUTO: 46.8 FL (ref 37–54)
DEPRECATED RDW RBC AUTO: 47.3 FL (ref 37–54)
DEPRECATED RDW RBC AUTO: 47.3 FL (ref 37–54)
DEPRECATED RDW RBC AUTO: 47.7 FL (ref 37–54)
DEPRECATED RDW RBC AUTO: 47.7 FL (ref 37–54)
DEPRECATED RDW RBC AUTO: 48.1 FL (ref 37–54)
DEPRECATED RDW RBC AUTO: 48.6 FL (ref 37–54)
DEPRECATED RDW RBC AUTO: 50.3 FL (ref 37–54)
EBV VCA IGM SER-ACNC: <36 U/ML (ref 0–35.9)
EOSINOPHIL # BLD AUTO: 0 10*3/MM3 (ref 0–0.4)
EOSINOPHIL # BLD AUTO: 0.1 10*3/MM3 (ref 0–0.4)
EOSINOPHIL # BLD AUTO: 0.5 10*3/MM3 (ref 0–0.4)
EOSINOPHIL NFR BLD AUTO: 0 % (ref 0.3–6.2)
EOSINOPHIL NFR BLD AUTO: 0.1 % (ref 0.3–6.2)
EOSINOPHIL NFR BLD AUTO: 0.2 % (ref 0.3–6.2)
EOSINOPHIL NFR BLD AUTO: 0.8 % (ref 0.3–6.2)
EOSINOPHIL NFR BLD AUTO: 1.9 % (ref 0.3–6.2)
EOSINOPHIL SPEC QL MICRO: 0 % EOS/100 CELLS (ref 0–0)
ERYTHROCYTE [DISTWIDTH] IN BLOOD BY AUTOMATED COUNT: 14.5 % (ref 12.3–15.4)
ERYTHROCYTE [DISTWIDTH] IN BLOOD BY AUTOMATED COUNT: 14.6 % (ref 12.3–15.4)
ERYTHROCYTE [DISTWIDTH] IN BLOOD BY AUTOMATED COUNT: 14.7 % (ref 12.3–15.4)
ERYTHROCYTE [DISTWIDTH] IN BLOOD BY AUTOMATED COUNT: 14.8 % (ref 12.3–15.4)
ERYTHROCYTE [DISTWIDTH] IN BLOOD BY AUTOMATED COUNT: 14.8 % (ref 12.3–15.4)
ERYTHROCYTE [DISTWIDTH] IN BLOOD BY AUTOMATED COUNT: 14.9 % (ref 12.3–15.4)
ERYTHROCYTE [DISTWIDTH] IN BLOOD BY AUTOMATED COUNT: 14.9 % (ref 12.3–15.4)
ERYTHROCYTE [DISTWIDTH] IN BLOOD BY AUTOMATED COUNT: 15 % (ref 12.3–15.4)
ERYTHROCYTE [DISTWIDTH] IN BLOOD BY AUTOMATED COUNT: 15.1 % (ref 12.3–15.4)
ERYTHROCYTE [DISTWIDTH] IN BLOOD BY AUTOMATED COUNT: 15.2 % (ref 12.3–15.4)
ERYTHROCYTE [DISTWIDTH] IN BLOOD BY AUTOMATED COUNT: 15.3 % (ref 12.3–15.4)
ERYTHROCYTE [DISTWIDTH] IN BLOOD BY AUTOMATED COUNT: 15.5 % (ref 12.3–15.4)
ERYTHROCYTE [DISTWIDTH] IN BLOOD BY AUTOMATED COUNT: 15.6 % (ref 12.3–15.4)
ERYTHROCYTE [DISTWIDTH] IN BLOOD BY AUTOMATED COUNT: 15.8 % (ref 12.3–15.4)
ERYTHROCYTE [DISTWIDTH] IN BLOOD BY AUTOMATED COUNT: 16 % (ref 12.3–15.4)
ERYTHROCYTE [DISTWIDTH] IN BLOOD BY AUTOMATED COUNT: 16.2 % (ref 12.3–15.4)
ERYTHROCYTE [DISTWIDTH] IN BLOOD BY AUTOMATED COUNT: 16.7 % (ref 12.3–15.4)
FERRITIN SERPL-MCNC: 218 NG/ML (ref 11–307)
FLUAV H1 2009 PAND RNA NPH QL NAA+PROBE: NOT DETECTED
FLUAV H1 2009 PAND RNA NPH QL NAA+PROBE: NOT DETECTED
FLUAV H1 HA GENE NPH QL NAA+PROBE: NOT DETECTED
FLUAV H1 HA GENE NPH QL NAA+PROBE: NOT DETECTED
FLUAV H3 RNA NPH QL NAA+PROBE: NOT DETECTED
FLUAV H3 RNA NPH QL NAA+PROBE: NOT DETECTED
FLUAV RNA SPEC QL NAA+PROBE: NEGATIVE
FLUAV SUBTYP SPEC NAA+PROBE: NOT DETECTED
FLUAV SUBTYP SPEC NAA+PROBE: NOT DETECTED
FLUBV RNA ISLT QL NAA+PROBE: NOT DETECTED
FLUBV RNA ISLT QL NAA+PROBE: NOT DETECTED
FLUBV RNA SPEC QL NAA+PROBE: NEGATIVE
FOLATE SERPL-MCNC: 6.5 NG/ML (ref 5.9–24.8)
GBM IGG SER-ACNC: 7 UNITS (ref 0–20)
GFR SERPL CREATININE-BSD FRML MDRD: 25 ML/MIN/1.73
GFR SERPL CREATININE-BSD FRML MDRD: 28 ML/MIN/1.73
GFR SERPL CREATININE-BSD FRML MDRD: 30 ML/MIN/1.73
GFR SERPL CREATININE-BSD FRML MDRD: 32 ML/MIN/1.73
GFR SERPL CREATININE-BSD FRML MDRD: 34 ML/MIN/1.73
GFR SERPL CREATININE-BSD FRML MDRD: 37 ML/MIN/1.73
GFR SERPL CREATININE-BSD FRML MDRD: 37 ML/MIN/1.73
GFR SERPL CREATININE-BSD FRML MDRD: 40 ML/MIN/1.73
GFR SERPL CREATININE-BSD FRML MDRD: 44 ML/MIN/1.73
GFR SERPL CREATININE-BSD FRML MDRD: 44 ML/MIN/1.73
GFR SERPL CREATININE-BSD FRML MDRD: 55 ML/MIN/1.73
GFR SERPL CREATININE-BSD FRML MDRD: 55 ML/MIN/1.73
GIANT PLATELETS: ABNORMAL
GIANT PLATELETS: NORMAL
GLOBULIN UR ELPH-MCNC: 1.5 GM/DL (ref 2.5–3.8)
GLOBULIN UR ELPH-MCNC: 1.6 GM/DL (ref 2.5–3.8)
GLOBULIN UR ELPH-MCNC: 1.6 GM/DL (ref 2.5–3.8)
GLOBULIN UR ELPH-MCNC: 1.7 GM/DL (ref 2.5–3.8)
GLOBULIN UR ELPH-MCNC: 1.9 GM/DL (ref 2.5–3.8)
GLOBULIN UR ELPH-MCNC: 2 GM/DL (ref 2.5–3.8)
GLOBULIN UR ELPH-MCNC: 2.1 GM/DL (ref 2.5–3.8)
GLOBULIN UR ELPH-MCNC: 2.3 GM/DL (ref 2.5–3.8)
GLOBULIN UR ELPH-MCNC: 2.4 GM/DL (ref 2.5–3.8)
GLOBULIN UR ELPH-MCNC: 2.6 GM/DL (ref 2.5–3.8)
GLOBULIN UR ELPH-MCNC: 2.7 GM/DL (ref 2.5–3.8)
GLOBULIN UR ELPH-MCNC: 2.7 GM/DL (ref 2.5–3.8)
GLOBULIN UR ELPH-MCNC: 2.8 GM/DL (ref 2.5–3.8)
GLOBULIN UR ELPH-MCNC: 2.9 GM/DL (ref 2.5–3.8)
GLOBULIN UR ELPH-MCNC: 2.9 GM/DL (ref 2.5–3.8)
GLOBULIN UR ELPH-MCNC: 3.3 GM/DL (ref 2.5–3.8)
GLUCOSE BLD-MCNC: 117 MG/DL (ref 65–99)
GLUCOSE BLD-MCNC: 136 MG/DL (ref 65–99)
GLUCOSE BLD-MCNC: 141 MG/DL (ref 65–99)
GLUCOSE BLD-MCNC: 147 MG/DL (ref 65–99)
GLUCOSE BLD-MCNC: 151 MG/DL (ref 65–99)
GLUCOSE BLD-MCNC: 156 MG/DL (ref 65–99)
GLUCOSE BLD-MCNC: 162 MG/DL (ref 65–99)
GLUCOSE BLD-MCNC: 163 MG/DL (ref 65–99)
GLUCOSE BLD-MCNC: 163 MG/DL (ref 65–99)
GLUCOSE BLD-MCNC: 164 MG/DL (ref 65–99)
GLUCOSE BLD-MCNC: 164 MG/DL (ref 65–99)
GLUCOSE BLD-MCNC: 170 MG/DL (ref 65–99)
GLUCOSE BLD-MCNC: 172 MG/DL (ref 65–99)
GLUCOSE BLD-MCNC: 180 MG/DL (ref 65–99)
GLUCOSE BLD-MCNC: 183 MG/DL (ref 65–99)
GLUCOSE BLD-MCNC: 185 MG/DL (ref 65–99)
GLUCOSE BLD-MCNC: 194 MG/DL (ref 65–99)
GLUCOSE BLD-MCNC: 206 MG/DL (ref 65–99)
GLUCOSE BLD-MCNC: 210 MG/DL (ref 65–99)
GLUCOSE BLD-MCNC: 216 MG/DL (ref 65–99)
GLUCOSE BLD-MCNC: 255 MG/DL (ref 65–99)
GLUCOSE BLDC GLUCOMTR-MCNC: 106 MG/DL (ref 70–105)
GLUCOSE BLDC GLUCOMTR-MCNC: 108 MG/DL (ref 70–105)
GLUCOSE BLDC GLUCOMTR-MCNC: 123 MG/DL (ref 70–105)
GLUCOSE BLDC GLUCOMTR-MCNC: 133 MG/DL (ref 70–105)
GLUCOSE BLDC GLUCOMTR-MCNC: 142 MG/DL (ref 70–105)
GLUCOSE BLDC GLUCOMTR-MCNC: 142 MG/DL (ref 70–105)
GLUCOSE BLDC GLUCOMTR-MCNC: 145 MG/DL (ref 70–105)
GLUCOSE BLDC GLUCOMTR-MCNC: 146 MG/DL (ref 70–105)
GLUCOSE BLDC GLUCOMTR-MCNC: 147 MG/DL (ref 70–105)
GLUCOSE BLDC GLUCOMTR-MCNC: 148 MG/DL (ref 70–105)
GLUCOSE BLDC GLUCOMTR-MCNC: 151 MG/DL (ref 70–105)
GLUCOSE BLDC GLUCOMTR-MCNC: 151 MG/DL (ref 70–105)
GLUCOSE BLDC GLUCOMTR-MCNC: 155 MG/DL (ref 70–105)
GLUCOSE BLDC GLUCOMTR-MCNC: 159 MG/DL (ref 70–105)
GLUCOSE BLDC GLUCOMTR-MCNC: 162 MG/DL (ref 70–105)
GLUCOSE BLDC GLUCOMTR-MCNC: 162 MG/DL (ref 70–105)
GLUCOSE BLDC GLUCOMTR-MCNC: 163 MG/DL (ref 70–105)
GLUCOSE BLDC GLUCOMTR-MCNC: 164 MG/DL (ref 70–105)
GLUCOSE BLDC GLUCOMTR-MCNC: 165 MG/DL (ref 70–105)
GLUCOSE BLDC GLUCOMTR-MCNC: 174 MG/DL (ref 70–105)
GLUCOSE BLDC GLUCOMTR-MCNC: 176 MG/DL (ref 74–100)
GLUCOSE BLDC GLUCOMTR-MCNC: 178 MG/DL (ref 70–105)
GLUCOSE BLDC GLUCOMTR-MCNC: 180 MG/DL (ref 70–105)
GLUCOSE BLDC GLUCOMTR-MCNC: 180 MG/DL (ref 70–105)
GLUCOSE BLDC GLUCOMTR-MCNC: 183 MG/DL (ref 70–105)
GLUCOSE BLDC GLUCOMTR-MCNC: 189 MG/DL (ref 70–105)
GLUCOSE BLDC GLUCOMTR-MCNC: 191 MG/DL (ref 70–105)
GLUCOSE BLDC GLUCOMTR-MCNC: 191 MG/DL (ref 70–105)
GLUCOSE BLDC GLUCOMTR-MCNC: 192 MG/DL (ref 70–105)
GLUCOSE BLDC GLUCOMTR-MCNC: 193 MG/DL (ref 70–105)
GLUCOSE BLDC GLUCOMTR-MCNC: 193 MG/DL (ref 70–105)
GLUCOSE BLDC GLUCOMTR-MCNC: 194 MG/DL (ref 70–105)
GLUCOSE BLDC GLUCOMTR-MCNC: 197 MG/DL (ref 70–105)
GLUCOSE BLDC GLUCOMTR-MCNC: 198 MG/DL (ref 70–105)
GLUCOSE BLDC GLUCOMTR-MCNC: 205 MG/DL (ref 70–105)
GLUCOSE BLDC GLUCOMTR-MCNC: 207 MG/DL (ref 70–105)
GLUCOSE BLDC GLUCOMTR-MCNC: 211 MG/DL (ref 70–105)
GLUCOSE BLDC GLUCOMTR-MCNC: 214 MG/DL (ref 70–105)
GLUCOSE BLDC GLUCOMTR-MCNC: 221 MG/DL (ref 74–100)
GLUCOSE BLDC GLUCOMTR-MCNC: 224 MG/DL (ref 70–105)
GLUCOSE BLDC GLUCOMTR-MCNC: 225 MG/DL (ref 70–105)
GLUCOSE BLDC GLUCOMTR-MCNC: 243 MG/DL (ref 70–105)
GLUCOSE BLDC GLUCOMTR-MCNC: 245 MG/DL (ref 70–105)
GLUCOSE BLDC GLUCOMTR-MCNC: 247 MG/DL (ref 70–105)
GLUCOSE BLDC GLUCOMTR-MCNC: 250 MG/DL (ref 70–105)
GLUCOSE BLDC GLUCOMTR-MCNC: 253 MG/DL (ref 70–105)
GLUCOSE BLDC GLUCOMTR-MCNC: 256 MG/DL (ref 70–105)
GLUCOSE BLDC GLUCOMTR-MCNC: 257 MG/DL (ref 70–105)
GLUCOSE BLDC GLUCOMTR-MCNC: 258 MG/DL (ref 70–105)
GLUCOSE BLDC GLUCOMTR-MCNC: 258 MG/DL (ref 70–105)
GLUCOSE BLDC GLUCOMTR-MCNC: 270 MG/DL (ref 70–105)
GLUCOSE BLDC GLUCOMTR-MCNC: 287 MG/DL (ref 70–105)
GLUCOSE SERPL-MCNC: 105 MG/DL (ref 65–99)
GLUCOSE SERPL-MCNC: 111 MG/DL (ref 65–99)
GLUCOSE UR QL: NEGATIVE MG/DL
GLUCOSE UR STRIP-MCNC: NEGATIVE MG/DL
GRAM STN SPEC: NORMAL
H CAPSUL AB TITR SER ID: NEGATIVE {TITER}
H CAPSUL AB TITR SER ID: NEGATIVE {TITER}
H CAPSUL AG UR-MCNC: <0.5 NG/ML
H CAPSUL AG UR-MCNC: POSITIVE NG/ML
HADV DNA SPEC NAA+PROBE: NOT DETECTED
HADV DNA SPEC NAA+PROBE: NOT DETECTED
HADV DNA SPEC QL NAA+PROBE: NEGATIVE
HBV SURFACE AB SER RIA-ACNC: ABNORMAL
HBV SURFACE AB SER RIA-ACNC: ABNORMAL
HBV SURFACE AB SER RIA-ACNC: REACTIVE
HBV SURFACE AB SER RIA-ACNC: REACTIVE
HBV SURFACE AG SERPL QL IA: NORMAL
HBV SURFACE AG SERPL QL IA: NORMAL
HCO3 BLDA-SCNC: 19.5 MMOL/L (ref 21–28)
HCO3 BLDA-SCNC: 20.3 MMOL/L (ref 21–28)
HCO3 BLDA-SCNC: 20.5 MMOL/L (ref 21–28)
HCO3 BLDA-SCNC: 22.3 MMOL/L (ref 21–28)
HCO3 BLDA-SCNC: 22.4 MMOL/L (ref 21–28)
HCO3 BLDA-SCNC: 22.6 MMOL/L (ref 21–28)
HCO3 BLDA-SCNC: 28.6 MMOL/L (ref 21–28)
HCO3 BLDA-SCNC: 29.1 MMOL/L (ref 21–28)
HCO3 BLDA-SCNC: 29.2 MMOL/L (ref 21–28)
HCO3 BLDA-SCNC: 43.4 MMOL/L (ref 21–28)
HCO3 BLDA-SCNC: NORMAL MMOL/L (ref 21–28)
HCOV 229E RNA SPEC QL NAA+PROBE: NOT DETECTED
HCOV 229E RNA SPEC QL NAA+PROBE: NOT DETECTED
HCOV HKU1 RNA SPEC QL NAA+PROBE: NOT DETECTED
HCOV HKU1 RNA SPEC QL NAA+PROBE: NOT DETECTED
HCOV NL63 RNA SPEC QL NAA+PROBE: NOT DETECTED
HCOV NL63 RNA SPEC QL NAA+PROBE: NOT DETECTED
HCOV OC43 RNA SPEC QL NAA+PROBE: NOT DETECTED
HCOV OC43 RNA SPEC QL NAA+PROBE: NOT DETECTED
HCT VFR BLD AUTO: 21.8 % (ref 34–46.6)
HCT VFR BLD AUTO: 22.4 % (ref 34–46.6)
HCT VFR BLD AUTO: 22.5 % (ref 34–46.6)
HCT VFR BLD AUTO: 23.1 % (ref 34–46.6)
HCT VFR BLD AUTO: 24.3 % (ref 34–46.6)
HCT VFR BLD AUTO: 24.3 % (ref 34–46.6)
HCT VFR BLD AUTO: 24.4 % (ref 34–46.6)
HCT VFR BLD AUTO: 24.9 % (ref 34–46.6)
HCT VFR BLD AUTO: 24.9 % (ref 34–46.6)
HCT VFR BLD AUTO: 25.2 % (ref 34–46.6)
HCT VFR BLD AUTO: 25.3 % (ref 34–46.6)
HCT VFR BLD AUTO: 25.4 % (ref 34–46.6)
HCT VFR BLD AUTO: 25.6 % (ref 34–46.6)
HCT VFR BLD AUTO: 26.5 % (ref 34–46.6)
HCT VFR BLD AUTO: 27.2 % (ref 34–46.6)
HCT VFR BLD AUTO: 27.9 % (ref 34–46.6)
HCT VFR BLD AUTO: 28.2 % (ref 34–46.6)
HCT VFR BLD AUTO: 28.3 % (ref 34–46.6)
HCT VFR BLD AUTO: 29.5 % (ref 34–46.6)
HCT VFR BLD AUTO: 29.5 % (ref 34–46.6)
HCT VFR BLD AUTO: 29.6 % (ref 34–46.6)
HCT VFR BLDA CALC: 27 % (ref 38–51)
HCT VFR BLDA CALC: 28 % (ref 38–51)
HEMODILUTION: NO
HGB BLD-MCNC: 7.4 G/DL (ref 12–15.9)
HGB BLD-MCNC: 7.5 G/DL (ref 12–15.9)
HGB BLD-MCNC: 7.5 G/DL (ref 12–15.9)
HGB BLD-MCNC: 7.6 G/DL (ref 12–15.9)
HGB BLD-MCNC: 7.7 G/DL (ref 12–15.9)
HGB BLD-MCNC: 7.7 G/DL (ref 12–15.9)
HGB BLD-MCNC: 7.9 G/DL (ref 12–15.9)
HGB BLD-MCNC: 8 G/DL (ref 12–15.9)
HGB BLD-MCNC: 8.1 G/DL (ref 12–15.9)
HGB BLD-MCNC: 8.2 G/DL (ref 12–15.9)
HGB BLD-MCNC: 8.3 G/DL (ref 12–15.9)
HGB BLD-MCNC: 8.4 G/DL (ref 12–15.9)
HGB BLD-MCNC: 8.4 G/DL (ref 12–15.9)
HGB BLD-MCNC: 8.5 G/DL (ref 12–15.9)
HGB BLD-MCNC: 8.8 G/DL (ref 12–15.9)
HGB BLD-MCNC: 9 G/DL (ref 12–15.9)
HGB BLD-MCNC: 9.1 G/DL (ref 12–15.9)
HGB BLD-MCNC: 9.2 G/DL (ref 12–15.9)
HGB BLD-MCNC: 9.5 G/DL (ref 12–15.9)
HGB BLD-MCNC: 9.6 G/DL (ref 12–15.9)
HGB BLD-MCNC: 9.7 G/DL (ref 12–15.9)
HGB BLD-MCNC: 9.9 G/DL (ref 12–15.9)
HGB BLDA-MCNC: 9.3 G/DL (ref 12–17)
HGB BLDA-MCNC: 9.4 G/DL (ref 12–17)
HGB UR QL STRIP.AUTO: ABNORMAL
HGB UR QL STRIP: NEGATIVE
HISTOPLASMA GAL'MANNAN AG QN U: 0.5 NG/ML
HMPV RNA NPH QL NAA+NON-PROBE: NOT DETECTED
HMPV RNA NPH QL NAA+NON-PROBE: NOT DETECTED
HMPV RNA SPEC QL NAA+PROBE: NEGATIVE
HOLD SPECIMEN: NORMAL
HOROWITZ INDEX BLD+IHG-RTO: 100 %
HOROWITZ INDEX BLD+IHG-RTO: 21 %
HOROWITZ INDEX BLD+IHG-RTO: 40 %
HOROWITZ INDEX BLD+IHG-RTO: 50 %
HOROWITZ INDEX BLD+IHG-RTO: 60 %
HOROWITZ INDEX BLD+IHG-RTO: 60 %
HOROWITZ INDEX BLD+IHG-RTO: 80 %
HOROWITZ INDEX BLD+IHG-RTO: 80 %
HPIV1 RNA SPEC QL NAA+PROBE: NEGATIVE
HPIV1 RNA SPEC QL NAA+PROBE: NOT DETECTED
HPIV1 RNA SPEC QL NAA+PROBE: NOT DETECTED
HPIV2 RNA SPEC QL NAA+PROBE: NOT DETECTED
HPIV2 RNA SPEC QL NAA+PROBE: NOT DETECTED
HPIV2 SPEC QL CULT: NEGATIVE
HPIV3 RNA NPH QL NAA+PROBE: NOT DETECTED
HPIV3 RNA NPH QL NAA+PROBE: NOT DETECTED
HPIV3 SPEC QL CULT: NEGATIVE
HPIV4 P GENE NPH QL NAA+PROBE: NOT DETECTED
HPIV4 P GENE NPH QL NAA+PROBE: NOT DETECTED
HSV TYPE 1: NOT DETECTED COPIES/ML
HSV2 DNA SPEC QL NAA+PROBE: NOT DETECTED COPIES/ML
HYALINE CASTS UR QL AUTO: ABNORMAL /LPF
INR PPP: 1.17 (ref 0.9–1.1)
INR PPP: 1.2 (ref 0.9–1.1)
INR PPP: 1.22 (ref 0.9–1.1)
INR PPP: 1.24 (ref 0.9–1.1)
INR PPP: 1.24 (ref 0.9–1.1)
INR PPP: 1.25 (ref 0.9–1.1)
INR PPP: 1.31 (ref 0.9–1.1)
IRON 24H UR-MRATE: 13 MCG/DL (ref 28–170)
KETONES UR QL STRIP: NEGATIVE
KETONES UR QL STRIP: NEGATIVE MG/DL
LAB AP CASE REPORT: NORMAL
LAB AP CLINICAL INFORMATION: NORMAL
LARGE PLATELETS: ABNORMAL
LEUKOCYTE ESTERASE UR QL STRIP.AUTO: NEGATIVE
LEUKOCYTE ESTERASE UR QL STRIP: NEGATIVE
LYMPHOCYTES # BLD AUTO: 0 10*3/MM3 (ref 0.7–3.1)
LYMPHOCYTES # BLD AUTO: 0.1 10*3/MM3 (ref 0.7–3.1)
LYMPHOCYTES # BLD AUTO: 0.3 10*3/MM3 (ref 0.7–3.1)
LYMPHOCYTES # BLD AUTO: 0.6 10*3/MM3 (ref 0.7–3.1)
LYMPHOCYTES # BLD AUTO: 0.6 10*3/MM3 (ref 0.7–3.1)
LYMPHOCYTES # BLD AUTO: 0.8 10*3/MM3 (ref 0.7–3.1)
LYMPHOCYTES # BLD AUTO: 0.8 10*3/MM3 (ref 0.7–3.1)
LYMPHOCYTES # BLD AUTO: 0.9 10*3/MM3 (ref 0.7–3.1)
LYMPHOCYTES # BLD AUTO: 1.5 10*3/MM3 (ref 0.7–3.1)
LYMPHOCYTES # BLD MANUAL: 0 10*3/MM3 (ref 0.7–3.1)
LYMPHOCYTES # BLD MANUAL: 0.47 10*3/MM3 (ref 0.7–3.1)
LYMPHOCYTES # BLD MANUAL: 0.47 10*3/MM3 (ref 0.7–3.1)
LYMPHOCYTES # BLD MANUAL: 0.71 10*3/MM3 (ref 0.7–3.1)
LYMPHOCYTES # BLD MANUAL: 0.78 10*3/MM3 (ref 0.7–3.1)
LYMPHOCYTES # BLD MANUAL: 1 10*3/MM3 (ref 0.7–3.1)
LYMPHOCYTES # BLD MANUAL: 1.16 10*3/MM3 (ref 0.7–3.1)
LYMPHOCYTES # BLD MANUAL: 1.24 10*3/MM3 (ref 0.7–3.1)
LYMPHOCYTES # BLD MANUAL: 1.78 10*3/MM3 (ref 0.7–3.1)
LYMPHOCYTES NFR BLD AUTO: 0.3 % (ref 19.6–45.3)
LYMPHOCYTES NFR BLD AUTO: 0.8 % (ref 19.6–45.3)
LYMPHOCYTES NFR BLD AUTO: 1.5 % (ref 19.6–45.3)
LYMPHOCYTES NFR BLD AUTO: 1.7 % (ref 19.6–45.3)
LYMPHOCYTES NFR BLD AUTO: 1.8 % (ref 19.6–45.3)
LYMPHOCYTES NFR BLD AUTO: 11.3 % (ref 19.6–45.3)
LYMPHOCYTES NFR BLD AUTO: 2.2 % (ref 19.6–45.3)
LYMPHOCYTES NFR BLD AUTO: 2.5 % (ref 19.6–45.3)
LYMPHOCYTES NFR BLD AUTO: 5.4 % (ref 19.6–45.3)
LYMPHOCYTES NFR BLD AUTO: 6.9 % (ref 19.6–45.3)
LYMPHOCYTES NFR BLD AUTO: 7 % (ref 19.6–45.3)
LYMPHOCYTES NFR BLD MANUAL: 0 % (ref 19.6–45.3)
LYMPHOCYTES NFR BLD MANUAL: 1 % (ref 5–12)
LYMPHOCYTES NFR BLD MANUAL: 10 % (ref 19.6–45.3)
LYMPHOCYTES NFR BLD MANUAL: 10 % (ref 5–12)
LYMPHOCYTES NFR BLD MANUAL: 2 % (ref 19.6–45.3)
LYMPHOCYTES NFR BLD MANUAL: 2 % (ref 19.6–45.3)
LYMPHOCYTES NFR BLD MANUAL: 2 % (ref 5–12)
LYMPHOCYTES NFR BLD MANUAL: 3 % (ref 5–12)
LYMPHOCYTES NFR BLD MANUAL: 4 % (ref 19.6–45.3)
LYMPHOCYTES NFR BLD MANUAL: 4 % (ref 19.6–45.3)
LYMPHOCYTES NFR BLD MANUAL: 4 % (ref 5–12)
LYMPHOCYTES NFR BLD MANUAL: 4 % (ref 5–12)
LYMPHOCYTES NFR BLD MANUAL: 5 % (ref 19.6–45.3)
LYMPHOCYTES NFR BLD MANUAL: 8 % (ref 19.6–45.3)
LYMPHOCYTES NFR BLD MANUAL: 9 % (ref 19.6–45.3)
Lab: ABNORMAL
Lab: NORMAL
Lab: NORMAL
M PNEUMO IGG SER IA-ACNC: NOT DETECTED
M PNEUMO IGG SER IA-ACNC: NOT DETECTED
MAGNESIUM SERPL-MCNC: 1.6 MG/DL (ref 1.8–2.5)
MAGNESIUM SERPL-MCNC: 1.6 MG/DL (ref 1.8–2.5)
MAGNESIUM SERPL-MCNC: 1.7 MG/DL (ref 1.8–2.5)
MAGNESIUM SERPL-MCNC: 1.9 MG/DL (ref 1.8–2.5)
MAGNESIUM SERPL-MCNC: 2 MG/DL (ref 1.8–2.5)
MAGNESIUM SERPL-MCNC: 2.3 MG/DL (ref 1.8–2.5)
MAGNESIUM SERPL-MCNC: 2.3 MG/DL (ref 1.8–2.5)
MAGNESIUM SERPL-MCNC: 2.4 MG/DL (ref 1.8–2.5)
MAGNESIUM SERPL-MCNC: 2.5 MG/DL (ref 1.8–2.5)
MAGNESIUM SERPL-MCNC: 2.6 MG/DL (ref 1.8–2.5)
MCH RBC QN AUTO: 27.6 PG (ref 26.6–33)
MCH RBC QN AUTO: 27.7 PG (ref 26.6–33)
MCH RBC QN AUTO: 28 PG (ref 26.6–33)
MCH RBC QN AUTO: 28.1 PG (ref 26.6–33)
MCH RBC QN AUTO: 28.2 PG (ref 26.6–33)
MCH RBC QN AUTO: 28.4 PG (ref 26.6–33)
MCH RBC QN AUTO: 28.5 PG (ref 26.6–33)
MCH RBC QN AUTO: 28.6 PG (ref 26.6–33)
MCH RBC QN AUTO: 28.6 PG (ref 26.6–33)
MCH RBC QN AUTO: 28.7 PG (ref 26.6–33)
MCH RBC QN AUTO: 28.9 PG (ref 26.6–33)
MCH RBC QN AUTO: 28.9 PG (ref 26.6–33)
MCH RBC QN AUTO: 29 PG (ref 26.6–33)
MCH RBC QN AUTO: 29.1 PG (ref 26.6–33)
MCH RBC QN AUTO: 29.2 PG (ref 26.6–33)
MCH RBC QN AUTO: 29.3 PG (ref 26.6–33)
MCHC RBC AUTO-ENTMCNC: 31.6 G/DL (ref 31.5–35.7)
MCHC RBC AUTO-ENTMCNC: 31.9 G/DL (ref 31.5–35.7)
MCHC RBC AUTO-ENTMCNC: 31.9 G/DL (ref 31.5–35.7)
MCHC RBC AUTO-ENTMCNC: 32.1 G/DL (ref 31.5–35.7)
MCHC RBC AUTO-ENTMCNC: 32.3 G/DL (ref 31.5–35.7)
MCHC RBC AUTO-ENTMCNC: 32.4 G/DL (ref 31.5–35.7)
MCHC RBC AUTO-ENTMCNC: 32.5 G/DL (ref 31.5–35.7)
MCHC RBC AUTO-ENTMCNC: 32.6 G/DL (ref 31.5–35.7)
MCHC RBC AUTO-ENTMCNC: 32.7 G/DL (ref 31.5–35.7)
MCHC RBC AUTO-ENTMCNC: 32.7 G/DL (ref 31.5–35.7)
MCHC RBC AUTO-ENTMCNC: 32.9 G/DL (ref 31.5–35.7)
MCHC RBC AUTO-ENTMCNC: 32.9 G/DL (ref 31.5–35.7)
MCHC RBC AUTO-ENTMCNC: 33 G/DL (ref 31.5–35.7)
MCHC RBC AUTO-ENTMCNC: 33.1 G/DL (ref 31.5–35.7)
MCHC RBC AUTO-ENTMCNC: 33.3 G/DL (ref 31.5–35.7)
MCHC RBC AUTO-ENTMCNC: 33.4 G/DL (ref 31.5–35.7)
MCHC RBC AUTO-ENTMCNC: 33.4 G/DL (ref 31.5–35.7)
MCHC RBC AUTO-ENTMCNC: 33.5 G/DL (ref 31.5–35.7)
MCHC RBC AUTO-ENTMCNC: 33.5 G/DL (ref 31.5–35.7)
MCHC RBC AUTO-ENTMCNC: 33.8 G/DL (ref 31.5–35.7)
MCHC RBC AUTO-ENTMCNC: 34.3 G/DL (ref 31.5–35.7)
MCV RBC AUTO: 85.3 FL (ref 79–97)
MCV RBC AUTO: 85.5 FL (ref 79–97)
MCV RBC AUTO: 85.6 FL (ref 79–97)
MCV RBC AUTO: 85.9 FL (ref 79–97)
MCV RBC AUTO: 86 FL (ref 79–97)
MCV RBC AUTO: 86 FL (ref 79–97)
MCV RBC AUTO: 86.2 FL (ref 79–97)
MCV RBC AUTO: 86.2 FL (ref 79–97)
MCV RBC AUTO: 86.4 FL (ref 79–97)
MCV RBC AUTO: 86.5 FL (ref 79–97)
MCV RBC AUTO: 86.7 FL (ref 79–97)
MCV RBC AUTO: 86.7 FL (ref 79–97)
MCV RBC AUTO: 86.9 FL (ref 79–97)
MCV RBC AUTO: 87.1 FL (ref 79–97)
MCV RBC AUTO: 87.3 FL (ref 79–97)
MCV RBC AUTO: 87.4 FL (ref 79–97)
MCV RBC AUTO: 87.6 FL (ref 79–97)
MCV RBC AUTO: 87.7 FL (ref 79–97)
MCV RBC AUTO: 87.8 FL (ref 79–97)
MCV RBC AUTO: 88 FL (ref 79–97)
MCV RBC AUTO: 88.1 FL (ref 79–97)
METAMYELOCYTES NFR BLD MANUAL: 1 % (ref 0–0)
METAMYELOCYTES NFR BLD MANUAL: 5 % (ref 0–0)
METHYLMALONATE SERPL-SCNC: 317 NMOL/L (ref 0–378)
MICROCYTES BLD QL: ABNORMAL
MODALITY: ABNORMAL
MODALITY: NORMAL
MONOCYTES # BLD AUTO: 0.2 10*3/MM3 (ref 0.1–0.9)
MONOCYTES # BLD AUTO: 0.2 10*3/MM3 (ref 0.1–0.9)
MONOCYTES # BLD AUTO: 0.27 10*3/MM3 (ref 0.1–0.9)
MONOCYTES # BLD AUTO: 0.3 10*3/MM3 (ref 0.1–0.9)
MONOCYTES # BLD AUTO: 0.3 10*3/MM3 (ref 0.1–0.9)
MONOCYTES # BLD AUTO: 0.31 10*3/MM3 (ref 0.1–0.9)
MONOCYTES # BLD AUTO: 0.47 10*3/MM3 (ref 0.1–0.9)
MONOCYTES # BLD AUTO: 0.5 10*3/MM3 (ref 0.1–0.9)
MONOCYTES # BLD AUTO: 0.52 10*3/MM3 (ref 0.1–0.9)
MONOCYTES # BLD AUTO: 0.6 10*3/MM3 (ref 0.1–0.9)
MONOCYTES # BLD AUTO: 0.7 10*3/MM3 (ref 0.1–0.9)
MONOCYTES # BLD AUTO: 0.8 10*3/MM3 (ref 0.1–0.9)
MONOCYTES # BLD AUTO: 0.8 10*3/MM3 (ref 0.1–0.9)
MONOCYTES # BLD AUTO: 1 10*3/MM3 (ref 0.1–0.9)
MONOCYTES # BLD AUTO: 1.78 10*3/MM3 (ref 0.1–0.9)
MONOCYTES NFR BLD AUTO: 1.3 % (ref 5–12)
MONOCYTES NFR BLD AUTO: 1.5 % (ref 5–12)
MONOCYTES NFR BLD AUTO: 1.8 % (ref 5–12)
MONOCYTES NFR BLD AUTO: 1.9 % (ref 5–12)
MONOCYTES NFR BLD AUTO: 1.9 % (ref 5–12)
MONOCYTES NFR BLD AUTO: 2.9 % (ref 5–12)
MONOCYTES NFR BLD AUTO: 3.3 % (ref 5–12)
MONOCYTES NFR BLD AUTO: 4.3 % (ref 5–12)
MONOCYTES NFR BLD AUTO: 4.5 % (ref 5–12)
MONOCYTES NFR BLD AUTO: 5.9 % (ref 5–12)
MONOCYTES NFR BLD AUTO: 6 % (ref 5–12)
MYELOCYTES NFR BLD MANUAL: 3 % (ref 0–0)
MYELOPEROXIDASE AB SER-ACNC: 89.2 U/ML (ref 0–9)
NEUTROPHILS # BLD AUTO: 10.19 10*3/MM3 (ref 1.7–7)
NEUTROPHILS # BLD AUTO: 11.1 10*3/MM3 (ref 1.7–7)
NEUTROPHILS # BLD AUTO: 14.24 10*3/MM3 (ref 1.7–7)
NEUTROPHILS # BLD AUTO: 14.8 10*3/MM3 (ref 1.7–7)
NEUTROPHILS # BLD AUTO: 15.2 10*3/MM3 (ref 1.7–7)
NEUTROPHILS # BLD AUTO: 16.3 10*3/MM3 (ref 1.7–7)
NEUTROPHILS # BLD AUTO: 16.9 10*3/MM3 (ref 1.7–7)
NEUTROPHILS # BLD AUTO: 17.8 10*3/MM3 (ref 1.7–7)
NEUTROPHILS # BLD AUTO: 17.8 10*3/MM3 (ref 1.7–7)
NEUTROPHILS # BLD AUTO: 18.1 10*3/MM3 (ref 1.7–7)
NEUTROPHILS # BLD AUTO: 18.53 10*3/MM3 (ref 1.7–7)
NEUTROPHILS # BLD AUTO: 20.9 10*3/MM3 (ref 1.7–7)
NEUTROPHILS # BLD AUTO: 21.88 10*3/MM3 (ref 1.7–7)
NEUTROPHILS # BLD AUTO: 22.46 10*3/MM3 (ref 1.7–7)
NEUTROPHILS # BLD AUTO: 22.7 10*3/MM3 (ref 1.7–7)
NEUTROPHILS # BLD AUTO: 23.7 10*3/MM3 (ref 1.7–7)
NEUTROPHILS # BLD AUTO: 29.45 10*3/MM3 (ref 1.7–7)
NEUTROPHILS # BLD AUTO: 7.83 10*3/MM3 (ref 1.7–7)
NEUTROPHILS # BLD AUTO: 9.6 10*3/MM3 (ref 1.7–7)
NEUTROPHILS # BLD AUTO: 9.6 10*3/MM3 (ref 1.7–7)
NEUTROPHILS NFR BLD AUTO: 82.1 % (ref 42.7–76)
NEUTROPHILS NFR BLD AUTO: 88.3 % (ref 42.7–76)
NEUTROPHILS NFR BLD AUTO: 88.6 % (ref 42.7–76)
NEUTROPHILS NFR BLD AUTO: 88.8 % (ref 42.7–76)
NEUTROPHILS NFR BLD AUTO: 93.7 % (ref 42.7–76)
NEUTROPHILS NFR BLD AUTO: 94.4 % (ref 42.7–76)
NEUTROPHILS NFR BLD AUTO: 94.7 % (ref 42.7–76)
NEUTROPHILS NFR BLD AUTO: 95.1 % (ref 42.7–76)
NEUTROPHILS NFR BLD AUTO: 96.1 % (ref 42.7–76)
NEUTROPHILS NFR BLD AUTO: 96.9 % (ref 42.7–76)
NEUTROPHILS NFR BLD AUTO: 98.2 % (ref 42.7–76)
NEUTROPHILS NFR BLD MANUAL: 69 % (ref 42.7–76)
NEUTROPHILS NFR BLD MANUAL: 72 % (ref 42.7–76)
NEUTROPHILS NFR BLD MANUAL: 75 % (ref 42.7–76)
NEUTROPHILS NFR BLD MANUAL: 85 % (ref 42.7–76)
NEUTROPHILS NFR BLD MANUAL: 88 % (ref 42.7–76)
NEUTROPHILS NFR BLD MANUAL: 89 % (ref 42.7–76)
NEUTROPHILS NFR BLD MANUAL: 90 % (ref 42.7–76)
NEUTROPHILS NFR BLD MANUAL: 91 % (ref 42.7–76)
NEUTROPHILS NFR BLD MANUAL: 92 % (ref 42.7–76)
NEUTS BAND NFR BLD MANUAL: 10 % (ref 0–5)
NEUTS BAND NFR BLD MANUAL: 14 % (ref 0–5)
NEUTS BAND NFR BLD MANUAL: 3 % (ref 0–5)
NEUTS BAND NFR BLD MANUAL: 4 % (ref 0–5)
NEUTS BAND NFR BLD MANUAL: 6 % (ref 0–5)
NEUTS BAND NFR BLD MANUAL: 7 % (ref 0–5)
NEUTS BAND NFR BLD MANUAL: 7 % (ref 0–5)
NEUTS BAND NFR BLD MANUAL: 8 % (ref 0–5)
NEUTS BAND NFR BLD MANUAL: 8 % (ref 0–5)
NEUTS VAC BLD QL SMEAR: ABNORMAL
NEUTS VAC BLD QL SMEAR: ABNORMAL
NITRITE UR QL STRIP: NEGATIVE
NITRITE UR QL STRIP: NEGATIVE
NRBC BLD AUTO-RTO: 0 /100 WBC (ref 0–0.2)
NRBC BLD AUTO-RTO: 0.1 /100 WBC (ref 0–0.2)
NRBC BLD AUTO-RTO: 0.2 /100 WBC (ref 0–0.2)
NRBC SPEC MANUAL: 1 /100 WBC (ref 0–0.2)
OVALOCYTES BLD QL SMEAR: NORMAL
P-ANCA ATYPICAL TITR SER IF: ABNORMAL TITER
P-ANCA TITR SER IF: ABNORMAL TITER
PATH REPORT.FINAL DX SPEC: NORMAL
PATH REPORT.GROSS SPEC: NORMAL
PCO2 BLDA: 31 MM HG (ref 35–48)
PCO2 BLDA: 32.6 MM HG (ref 35–48)
PCO2 BLDA: 34.4 MM HG (ref 35–48)
PCO2 BLDA: 34.9 MM HG (ref 35–48)
PCO2 BLDA: 35.9 MM HG (ref 35–48)
PCO2 BLDA: 36 MM HG (ref 35–48)
PCO2 BLDA: 37.1 MM HG (ref 35–48)
PCO2 BLDA: 38.6 MM HG (ref 35–48)
PCO2 BLDA: 45.3 MM HG (ref 35–48)
PCO2 BLDA: 51.4 MM HG (ref 35–48)
PCO2 BLDA: NORMAL MM HG (ref 35–48)
PEEP RESPIRATORY: 5 CM[H2O]
PEEP RESPIRATORY: 8 CM[H2O]
PEEP RESPIRATORY: 8 CM[H2O]
PF4 HEPARIN CMPLX AB SER-ACNC: 0.35 OD (ref 0–0.4)
PH BLDA: 7.38 PH UNITS (ref 7.35–7.45)
PH BLDA: 7.4 PH UNITS (ref 7.35–7.45)
PH BLDA: 7.4 PH UNITS (ref 7.35–7.45)
PH BLDA: 7.41 PH UNITS (ref 7.35–7.45)
PH BLDA: 7.42 PH UNITS (ref 7.35–7.45)
PH BLDA: 7.48 PH UNITS (ref 7.35–7.45)
PH BLDA: 7.5 PH UNITS (ref 7.35–7.45)
PH BLDA: 7.53 PH UNITS (ref 7.35–7.45)
PH BLDA: NORMAL PH UNITS (ref 7.35–7.45)
PH UR STRIP.AUTO: 5.5 [PH] (ref 5–8)
PH UR STRIP.AUTO: 6.5 [PH] (ref 4.5–8)
PHOSPHATE SERPL-MCNC: 2.2 MG/DL (ref 2.4–4.7)
PHOSPHATE SERPL-MCNC: 2.5 MG/DL (ref 2.4–4.7)
PHOSPHATE SERPL-MCNC: 3.1 MG/DL (ref 2.4–4.7)
PHOSPHATE SERPL-MCNC: 3.2 MG/DL (ref 2.4–4.7)
PHOSPHATE SERPL-MCNC: 3.4 MG/DL (ref 2.4–4.7)
PHOSPHATE SERPL-MCNC: 3.4 MG/DL (ref 2.4–4.7)
PHOSPHATE SERPL-MCNC: 3.6 MG/DL (ref 2.4–4.7)
PHOSPHATE SERPL-MCNC: 3.7 MG/DL (ref 2.4–4.7)
PHOSPHATE SERPL-MCNC: 3.8 MG/DL (ref 2.4–4.7)
PHOSPHATE SERPL-MCNC: 3.9 MG/DL (ref 2.4–4.7)
PHOSPHATE SERPL-MCNC: 4 MG/DL (ref 2.4–4.7)
PHOSPHATE SERPL-MCNC: 4.1 MG/DL (ref 2.4–4.7)
PHOSPHATE SERPL-MCNC: 4.2 MG/DL (ref 2.4–4.7)
PHOSPHATE SERPL-MCNC: 4.4 MG/DL (ref 2.4–4.7)
PHOSPHATE SERPL-MCNC: 4.4 MG/DL (ref 2.4–4.7)
PHOSPHATE SERPL-MCNC: 4.5 MG/DL (ref 2.4–4.7)
PHOSPHATE SERPL-MCNC: 4.5 MG/DL (ref 2.4–4.7)
PHOSPHATE SERPL-MCNC: 5.5 MG/DL (ref 2.4–4.7)
PLAT MORPH BLD: NORMAL
PLATELET # BLD AUTO: 138 10*3/MM3 (ref 140–450)
PLATELET # BLD AUTO: 145 10*3/MM3 (ref 140–450)
PLATELET # BLD AUTO: 146 10*3/MM3 (ref 140–450)
PLATELET # BLD AUTO: 147 10*3/MM3 (ref 140–450)
PLATELET # BLD AUTO: 148 10*3/MM3 (ref 140–450)
PLATELET # BLD AUTO: 178 10*3/MM3 (ref 140–450)
PLATELET # BLD AUTO: 190 10*3/MM3 (ref 140–450)
PLATELET # BLD AUTO: 199 10*3/MM3 (ref 140–450)
PLATELET # BLD AUTO: 204 10*3/MM3 (ref 140–450)
PLATELET # BLD AUTO: 217 10*3/MM3 (ref 140–450)
PLATELET # BLD AUTO: 219 10*3/MM3 (ref 140–450)
PLATELET # BLD AUTO: 241 10*3/MM3 (ref 140–450)
PLATELET # BLD AUTO: 281 10*3/MM3 (ref 140–450)
PLATELET # BLD AUTO: 50 10*3/MM3 (ref 140–450)
PLATELET # BLD AUTO: 53 10*3/MM3 (ref 140–450)
PLATELET # BLD AUTO: 56 10*3/MM3 (ref 140–450)
PLATELET # BLD AUTO: 72 10*3/MM3 (ref 140–450)
PLATELET # BLD AUTO: 73 10*3/MM3 (ref 140–450)
PLATELET # BLD AUTO: 79 10*3/MM3 (ref 140–450)
PLATELET # BLD AUTO: 80 10*3/MM3 (ref 140–450)
PLATELET # BLD AUTO: 86 10*3/MM3 (ref 140–450)
PMV BLD AUTO: 7.3 FL (ref 6–12)
PMV BLD AUTO: 7.3 FL (ref 6–12)
PMV BLD AUTO: 7.4 FL (ref 6–12)
PMV BLD AUTO: 7.5 FL (ref 6–12)
PMV BLD AUTO: 7.5 FL (ref 6–12)
PMV BLD AUTO: 7.7 FL (ref 6–12)
PMV BLD AUTO: 7.7 FL (ref 6–12)
PMV BLD AUTO: 7.8 FL (ref 6–12)
PMV BLD AUTO: 8.2 FL (ref 6–12)
PMV BLD AUTO: 8.7 FL (ref 6–12)
PMV BLD AUTO: 8.8 FL (ref 6–12)
PMV BLD AUTO: 8.8 FL (ref 6–12)
PMV BLD AUTO: 8.9 FL (ref 6–12)
PMV BLD AUTO: 9 FL (ref 6–12)
PMV BLD AUTO: 9.1 FL (ref 6–12)
PMV BLD AUTO: 9.2 FL (ref 6–12)
PMV BLD AUTO: 9.3 FL (ref 6–12)
PMV BLD AUTO: 9.3 FL (ref 6–12)
PMV BLD AUTO: 9.9 FL (ref 6–12)
PO2 BLDA: 100.8 MM HG (ref 83–108)
PO2 BLDA: 128 MM HG (ref 83–108)
PO2 BLDA: 167.9 MM HG (ref 83–108)
PO2 BLDA: 30.2 MM HG (ref 83–108)
PO2 BLDA: 52.9 MM HG (ref 83–108)
PO2 BLDA: 56.1 MM HG (ref 83–108)
PO2 BLDA: 66.2 MM HG (ref 83–108)
PO2 BLDA: 73.8 MM HG (ref 83–108)
PO2 BLDA: 90.8 MM HG (ref 83–108)
PO2 BLDA: 95 MM HG (ref 83–108)
PO2 BLDA: NORMAL MM HG (ref 83–108)
POIKILOCYTOSIS BLD QL SMEAR: ABNORMAL
POIKILOCYTOSIS BLD QL SMEAR: ABNORMAL
POIKILOCYTOSIS BLD QL SMEAR: NORMAL
POTASSIUM BLD-SCNC: 2.7 MMOL/L (ref 3.6–5.1)
POTASSIUM BLD-SCNC: 3.1 MMOL/L (ref 3.6–5.1)
POTASSIUM BLD-SCNC: 3.1 MMOL/L (ref 3.6–5.1)
POTASSIUM BLD-SCNC: 3.3 MMOL/L (ref 3.6–5.1)
POTASSIUM BLD-SCNC: 3.4 MMOL/L (ref 3.6–5.1)
POTASSIUM BLD-SCNC: 3.5 MMOL/L (ref 3.6–5.1)
POTASSIUM BLD-SCNC: 3.6 MMOL/L (ref 3.6–5.1)
POTASSIUM BLD-SCNC: 3.6 MMOL/L (ref 3.6–5.1)
POTASSIUM BLD-SCNC: 3.7 MMOL/L (ref 3.6–5.1)
POTASSIUM BLD-SCNC: 3.8 MMOL/L (ref 3.6–5.1)
POTASSIUM BLD-SCNC: 3.8 MMOL/L (ref 3.6–5.1)
POTASSIUM BLD-SCNC: 3.9 MMOL/L (ref 3.6–5.1)
POTASSIUM BLD-SCNC: 3.9 MMOL/L (ref 3.6–5.1)
POTASSIUM BLD-SCNC: 4.3 MMOL/L (ref 3.6–5.1)
POTASSIUM BLD-SCNC: 4.4 MMOL/L (ref 3.6–5.1)
POTASSIUM BLDA-SCNC: 3.7 MMOL/L (ref 3.5–4.5)
POTASSIUM SERPL-SCNC: 4.2 MMOL/L (ref 3.6–5.1)
POTASSIUM SERPL-SCNC: 4.2 MMOL/L (ref 3.6–5.1)
PROCALCITONIN SERPL-MCNC: 0.14 NG/ML (ref 0–0.5)
PROT SERPL-MCNC: 4 G/DL (ref 6.1–7.9)
PROT SERPL-MCNC: 4.4 G/DL (ref 6.1–7.9)
PROT SERPL-MCNC: 4.7 G/DL (ref 6.1–7.9)
PROT SERPL-MCNC: 4.9 G/DL (ref 6.1–7.9)
PROT SERPL-MCNC: 5.2 G/DL (ref 6.1–7.9)
PROT SERPL-MCNC: 5.3 G/DL (ref 6.1–7.9)
PROT SERPL-MCNC: 5.3 G/DL (ref 6.1–7.9)
PROT SERPL-MCNC: 5.4 G/DL (ref 6.1–7.9)
PROT SERPL-MCNC: 5.5 G/DL (ref 6.1–7.9)
PROT SERPL-MCNC: 5.6 G/DL (ref 6.1–7.9)
PROT SERPL-MCNC: 5.9 G/DL (ref 6.1–7.9)
PROT SERPL-MCNC: 6.6 G/DL (ref 6.1–7.9)
PROT UR QL STRIP: ABNORMAL
PROTEINASE3 AB SER IA-ACNC: 34.5 U/ML (ref 0–3.5)
PROTHROMBIN TIME: 11.8 SECONDS (ref 9.6–11.7)
PROTHROMBIN TIME: 12 SECONDS (ref 9.6–11.7)
PROTHROMBIN TIME: 12.2 SECONDS (ref 9.6–11.7)
PROTHROMBIN TIME: 12.4 SECONDS (ref 9.6–11.7)
PROTHROMBIN TIME: 12.4 SECONDS (ref 9.6–11.7)
PROTHROMBIN TIME: 12.5 SECONDS (ref 9.6–11.7)
PROTHROMBIN TIME: 13 SECONDS (ref 9.6–11.7)
PSV: 10 CMH2O
RBC # BLD AUTO: 2.55 10*6/MM3 (ref 3.77–5.28)
RBC # BLD AUTO: 2.59 10*6/MM3 (ref 3.77–5.28)
RBC # BLD AUTO: 2.61 10*6/MM3 (ref 3.77–5.28)
RBC # BLD AUTO: 2.62 10*6/MM3 (ref 3.77–5.28)
RBC # BLD AUTO: 2.75 10*6/MM3 (ref 3.77–5.28)
RBC # BLD AUTO: 2.78 10*6/MM3 (ref 3.77–5.28)
RBC # BLD AUTO: 2.79 10*6/MM3 (ref 3.77–5.28)
RBC # BLD AUTO: 2.85 10*6/MM3 (ref 3.77–5.28)
RBC # BLD AUTO: 2.86 10*6/MM3 (ref 3.77–5.28)
RBC # BLD AUTO: 2.88 10*6/MM3 (ref 3.77–5.28)
RBC # BLD AUTO: 2.89 10*6/MM3 (ref 3.77–5.28)
RBC # BLD AUTO: 2.9 10*6/MM3 (ref 3.77–5.28)
RBC # BLD AUTO: 2.92 10*6/MM3 (ref 3.77–5.28)
RBC # BLD AUTO: 2.96 10*6/MM3 (ref 3.77–5.28)
RBC # BLD AUTO: 3.06 10*6/MM3 (ref 3.77–5.28)
RBC # BLD AUTO: 3.16 10*6/MM3 (ref 3.77–5.28)
RBC # BLD AUTO: 3.23 10*6/MM3 (ref 3.77–5.28)
RBC # BLD AUTO: 3.23 10*6/MM3 (ref 3.77–5.28)
RBC # BLD AUTO: 3.3 10*6/MM3 (ref 3.77–5.28)
RBC # BLD AUTO: 3.39 10*6/MM3 (ref 3.77–5.28)
RBC # BLD AUTO: 3.43 10*6/MM3 (ref 3.77–5.28)
RBC # UR: ABNORMAL /HPF
RBC #/AREA URNS HPF: 0 /[HPF] (ref 0–3)
RBC MORPH BLD: NORMAL
REF LAB TEST METHOD: ABNORMAL
RESPIRATORY RATE: 18
RESPIRATORY RATE: 20
RESPIRATORY RATE: 20
RESPIRATORY RATE: 24
RH BLD: POSITIVE
RHINOVIRUS RNA SPEC NAA+PROBE: NOT DETECTED
RHINOVIRUS RNA SPEC NAA+PROBE: NOT DETECTED
RHINOVIRUS RNA SPEC QL NAA+PROBE: NEGATIVE
RSV A: NEGATIVE
RSV B RNA SPEC QL NAA+PROBE: NEGATIVE
RSV RNA NPH QL NAA+NON-PROBE: NOT DETECTED
RSV RNA NPH QL NAA+NON-PROBE: NOT DETECTED
SAO2 % BLDCOA: 58.2 % (ref 94–98)
SAO2 % BLDCOA: 87.4 % (ref 94–98)
SAO2 % BLDCOA: 91.4 % (ref 94–98)
SAO2 % BLDCOA: 94.1 % (ref 94–98)
SAO2 % BLDCOA: 95.9 % (ref 94–98)
SAO2 % BLDCOA: 97.2 % (ref 94–98)
SAO2 % BLDCOA: 97.3 % (ref 94–98)
SAO2 % BLDCOA: 97.9 % (ref 94–98)
SAO2 % BLDCOA: 99 % (ref 94–98)
SAO2 % BLDCOA: 99.5 % (ref 94–98)
SCAN SLIDE: NORMAL
SMALL PLATELETS BLD QL SMEAR: ABNORMAL
SMALL PLATELETS BLD QL SMEAR: ADEQUATE
SMALL PLATELETS BLD QL SMEAR: NORMAL
SODIUM BLD-SCNC: 131 MMOL/L (ref 136–144)
SODIUM BLD-SCNC: 133 MMOL/L (ref 136–144)
SODIUM BLD-SCNC: 134 MMOL/L (ref 136–144)
SODIUM BLD-SCNC: 134 MMOL/L (ref 136–144)
SODIUM BLD-SCNC: 135 MMOL/L (ref 136–144)
SODIUM BLD-SCNC: 137 MMOL/L (ref 136–144)
SODIUM BLD-SCNC: 137 MMOL/L (ref 136–144)
SODIUM BLD-SCNC: 138 MMOL/L (ref 136–144)
SODIUM BLD-SCNC: 139 MMOL/L (ref 136–144)
SODIUM BLD-SCNC: 139 MMOL/L (ref 136–144)
SODIUM BLD-SCNC: 140 MMOL/L (ref 136–144)
SODIUM BLD-SCNC: 142 MMOL/L (ref 136–144)
SODIUM BLD-SCNC: 144 MMOL/L (ref 136–144)
SODIUM BLD-SCNC: 148 MMOL/L (ref 136–144)
SODIUM BLD-SCNC: 152 MMOL/L (ref 136–144)
SODIUM BLDA-SCNC: 134 MMOL/L (ref 138–146)
SODIUM SERPL-SCNC: 135 MMOL/L (ref 136–144)
SODIUM SERPL-SCNC: 137 MMOL/L (ref 136–144)
SODIUM UR-SCNC: 25 MMOL/L
SP GR UR STRIP: 1.01 (ref 1–1.03)
SP GR UR: 1.01 (ref 1–1.03)
SPECIMEN SOURCE: NEGATIVE
SPECIMEN SOURCE: NORMAL
SPECIMEN SOURCE: NORMAL
SPECIMEN STATUS: NORMAL
SPERM URNS QL MICRO: NORMAL /[HPF]
SQUAMOUS #/AREA URNS HPF: ABNORMAL /HPF
SQUAMOUS SPT QL MICRO: 0 /[HPF] (ref 0–5)
T&S EXPIRATION DATE: NORMAL
TOTAL RATE: 28 BREATHS/MINUTE
TOXIC GRANULATION: ABNORMAL
TOXIC GRANULATION: NORMAL
TRIGL SERPL-MCNC: 125 MG/DL
TROPONIN I SERPL-MCNC: 0.03 NG/ML (ref 0–0.03)
TROPONIN I SERPL-MCNC: <0.03 NG/ML (ref 0–0.03)
TSH SERPL DL<=0.05 MIU/L-ACNC: 0.39 MIU/ML (ref 0.34–5.6)
TSH SERPL DL<=0.05 MIU/L-ACNC: 0.51 MIU/ML (ref 0.34–5.6)
UNIDENT CRYS URNS QL MICRO: NORMAL /[HPF]
UNIT  ABO: NORMAL
UNIT  RH: NORMAL
URATE SERPL-MCNC: 5.7 MG/DL (ref 2.6–8)
UROBILINOGEN UR QL STRIP: ABNORMAL
VANCOMYCIN PEAK SERPL-MCNC: 27.2 MCG/ML (ref 20–40)
VANCOMYCIN PEAK SERPL-MCNC: 9.9 MCG/ML (ref 20–40)
VANCOMYCIN TROUGH SERPL-MCNC: 14.8 MCG/ML (ref 10–20)
VARIANT LYMPHS NFR BLD MANUAL: 1 % (ref 0–5)
VENTILATOR MODE: ABNORMAL
VIT B12 BLD-MCNC: 395 PG/ML (ref 180–914)
VT ON VENT VENT: 450 ML
VT ON VENT VENT: 500 ML
VT ON VENT VENT: 550 ML
VT ON VENT VENT: 600 ML
WBC #/AREA URNS HPF: 1 /[HPF] (ref 0–5)
WBC MORPH BLD: NORMAL
WBC NRBC COR # BLD: 10.8 10*3/MM3 (ref 3.4–10.8)
WBC NRBC COR # BLD: 10.9 10*3/MM3 (ref 3.4–10.8)
WBC NRBC COR # BLD: 12.9 10*3/MM3 (ref 3.4–10.8)
WBC NRBC COR # BLD: 13.6 10*3/MM3 (ref 3.4–10.8)
WBC NRBC COR # BLD: 15.6 10*3/MM3 (ref 3.4–10.8)
WBC NRBC COR # BLD: 16.6 10*3/MM3 (ref 3.4–10.8)
WBC NRBC COR # BLD: 17.1 10*3/MM3 (ref 3.4–10.8)
WBC NRBC COR # BLD: 17.5 10*3/MM3 (ref 3.4–10.8)
WBC NRBC COR # BLD: 17.8 10*3/MM3 (ref 3.4–10.8)
WBC NRBC COR # BLD: 18.5 10*3/MM3 (ref 3.4–10.8)
WBC NRBC COR # BLD: 19.1 10*3/MM3 (ref 3.4–10.8)
WBC NRBC COR # BLD: 19.5 10*3/MM3 (ref 3.4–10.8)
WBC NRBC COR # BLD: 20 10*3/MM3 (ref 3.4–10.8)
WBC NRBC COR # BLD: 22.1 10*3/MM3 (ref 3.4–10.8)
WBC NRBC COR # BLD: 22.2 10*3/MM3 (ref 3.4–10.8)
WBC NRBC COR # BLD: 23.4 10*3/MM3 (ref 3.4–10.8)
WBC NRBC COR # BLD: 23.4 10*3/MM3 (ref 3.4–10.8)
WBC NRBC COR # BLD: 25.4 10*3/MM3 (ref 3.4–10.8)
WBC NRBC COR # BLD: 31 10*3/MM3 (ref 3.4–10.8)
WBC NRBC COR # BLD: 33.9 10*3/MM3 (ref 3.4–10.8)
WBC NRBC COR # BLD: 8.9 10*3/MM3 (ref 3.4–10.8)
WBC UR QL AUTO: ABNORMAL /HPF
WHOLE BLOOD HOLD SPECIMEN: NORMAL
WHOLE BLOOD HOLD SPECIMEN: NORMAL
YEAST SPEC QL WET PREP: NORMAL /[HPF]

## 2019-01-01 PROCEDURE — 97530 THERAPEUTIC ACTIVITIES: CPT

## 2019-01-01 PROCEDURE — 94799 UNLISTED PULMONARY SVC/PX: CPT

## 2019-01-01 PROCEDURE — P9016 RBC LEUKOCYTES REDUCED: HCPCS

## 2019-01-01 PROCEDURE — 80053 COMPREHEN METABOLIC PANEL: CPT | Performed by: INTERNAL MEDICINE

## 2019-01-01 PROCEDURE — 63710000001 CYCLOPHOSPHAMIDE PER 25 MG: Performed by: NURSE PRACTITIONER

## 2019-01-01 PROCEDURE — 25010000002 CEFEPIME PER 500 MG: Performed by: FAMILY MEDICINE

## 2019-01-01 PROCEDURE — 63710000001 INSULIN LISPRO (HUMAN) PER 5 UNITS: Performed by: INTERNAL MEDICINE

## 2019-01-01 PROCEDURE — 36600 WITHDRAWAL OF ARTERIAL BLOOD: CPT

## 2019-01-01 PROCEDURE — 83735 ASSAY OF MAGNESIUM: CPT | Performed by: INTERNAL MEDICINE

## 2019-01-01 PROCEDURE — 87486 CHLMYD PNEUM DNA AMP PROBE: CPT | Performed by: EMERGENCY MEDICINE

## 2019-01-01 PROCEDURE — 83921 ORGANIC ACID SINGLE QUANT: CPT | Performed by: INTERNAL MEDICINE

## 2019-01-01 PROCEDURE — 86901 BLOOD TYPING SEROLOGIC RH(D): CPT

## 2019-01-01 PROCEDURE — 82550 ASSAY OF CK (CPK): CPT | Performed by: INTERNAL MEDICINE

## 2019-01-01 PROCEDURE — 94660 CPAP INITIATION&MGMT: CPT

## 2019-01-01 PROCEDURE — P9047 ALBUMIN (HUMAN), 25%, 50ML: HCPCS | Performed by: INTERNAL MEDICINE

## 2019-01-01 PROCEDURE — 25010000002 CALCIUM GLUCONATE PER 10 ML: Performed by: INTERNAL MEDICINE

## 2019-01-01 PROCEDURE — 25010000002 METHYLPREDNISOLONE PER 125 MG: Performed by: INTERNAL MEDICINE

## 2019-01-01 PROCEDURE — 97112 NEUROMUSCULAR REEDUCATION: CPT

## 2019-01-01 PROCEDURE — 25010000002 METHYLPREDNISOLONE PER 40 MG: Performed by: INTERNAL MEDICINE

## 2019-01-01 PROCEDURE — 85007 BL SMEAR W/DIFF WBC COUNT: CPT | Performed by: INTERNAL MEDICINE

## 2019-01-01 PROCEDURE — 85014 HEMATOCRIT: CPT | Performed by: INTERNAL MEDICINE

## 2019-01-01 PROCEDURE — 82803 BLOOD GASES ANY COMBINATION: CPT

## 2019-01-01 PROCEDURE — 92610 EVALUATE SWALLOWING FUNCTION: CPT

## 2019-01-01 PROCEDURE — 25010000002 LORAZEPAM PER 2 MG: Performed by: INTERNAL MEDICINE

## 2019-01-01 PROCEDURE — 84100 ASSAY OF PHOSPHORUS: CPT | Performed by: INTERNAL MEDICINE

## 2019-01-01 PROCEDURE — 25010000002 CEFEPIME IN SWFI 2 GM/10ML IV PUSH SYRINGE (SIMPLE): Performed by: EMERGENCY MEDICINE

## 2019-01-01 PROCEDURE — 82962 GLUCOSE BLOOD TEST: CPT

## 2019-01-01 PROCEDURE — 85025 COMPLETE CBC W/AUTO DIFF WBC: CPT | Performed by: INTERNAL MEDICINE

## 2019-01-01 PROCEDURE — 25010000002 MORPHINE PER 10 MG: Performed by: INTERNAL MEDICINE

## 2019-01-01 PROCEDURE — 25010000002 RITUXIMAB 100 MG/10ML SOLUTION 10 ML VIAL: Performed by: INTERNAL MEDICINE

## 2019-01-01 PROCEDURE — 83520 IMMUNOASSAY QUANT NOS NONAB: CPT | Performed by: INTERNAL MEDICINE

## 2019-01-01 PROCEDURE — 94003 VENT MGMT INPAT SUBQ DAY: CPT

## 2019-01-01 PROCEDURE — 86612 BLASTOMYCES ANTIBODY: CPT | Performed by: INTERNAL MEDICINE

## 2019-01-01 PROCEDURE — 25010000002 MAGNESIUM SULFATE IN D5W 1G/100ML (PREMIX) 1-5 GM/100ML-% SOLUTION: Performed by: INTERNAL MEDICINE

## 2019-01-01 PROCEDURE — 80202 ASSAY OF VANCOMYCIN: CPT | Performed by: INTERNAL MEDICINE

## 2019-01-01 PROCEDURE — 97110 THERAPEUTIC EXERCISES: CPT

## 2019-01-01 PROCEDURE — 87633 RESP VIRUS 12-25 TARGETS: CPT | Performed by: FAMILY MEDICINE

## 2019-01-01 PROCEDURE — 93005 ELECTROCARDIOGRAM TRACING: CPT | Performed by: EMERGENCY MEDICINE

## 2019-01-01 PROCEDURE — 86022 PLATELET ANTIBODIES: CPT | Performed by: NURSE PRACTITIONER

## 2019-01-01 PROCEDURE — 94760 N-INVAS EAR/PLS OXIMETRY 1: CPT

## 2019-01-01 PROCEDURE — 63710000001 INSULIN LISPRO (HUMAN) PER 5 UNITS: Performed by: FAMILY MEDICINE

## 2019-01-01 PROCEDURE — 85379 FIBRIN DEGRADATION QUANT: CPT

## 2019-01-01 PROCEDURE — 82330 ASSAY OF CALCIUM: CPT | Performed by: INTERNAL MEDICINE

## 2019-01-01 PROCEDURE — 99024 POSTOP FOLLOW-UP VISIT: CPT | Performed by: PHYSICIAN ASSISTANT

## 2019-01-01 PROCEDURE — 85018 HEMOGLOBIN: CPT

## 2019-01-01 PROCEDURE — P9017 PLASMA 1 DONOR FRZ W/IN 8 HR: HCPCS

## 2019-01-01 PROCEDURE — 25010000002 MORPHINE PER 10 MG

## 2019-01-01 PROCEDURE — 85730 THROMBOPLASTIN TIME PARTIAL: CPT | Performed by: INTERNAL MEDICINE

## 2019-01-01 PROCEDURE — 85610 PROTHROMBIN TIME: CPT | Performed by: INTERNAL MEDICINE

## 2019-01-01 PROCEDURE — 86606 ASPERGILLUS ANTIBODY: CPT | Performed by: INTERNAL MEDICINE

## 2019-01-01 PROCEDURE — 85018 HEMOGLOBIN: CPT | Performed by: INTERNAL MEDICINE

## 2019-01-01 PROCEDURE — 86927 PLASMA FRESH FROZEN: CPT

## 2019-01-01 PROCEDURE — 92526 ORAL FUNCTION THERAPY: CPT

## 2019-01-01 PROCEDURE — 25010000002 PROPOFOL 200 MG/20ML EMULSION: Performed by: ANESTHESIOLOGY

## 2019-01-01 PROCEDURE — 84484 ASSAY OF TROPONIN QUANT: CPT | Performed by: EMERGENCY MEDICINE

## 2019-01-01 PROCEDURE — 85379 FIBRIN DEGRADATION QUANT: CPT | Performed by: EMERGENCY MEDICINE

## 2019-01-01 PROCEDURE — 86850 RBC ANTIBODY SCREEN: CPT | Performed by: INTERNAL MEDICINE

## 2019-01-01 PROCEDURE — 71045 X-RAY EXAM CHEST 1 VIEW: CPT

## 2019-01-01 PROCEDURE — 86850 RBC ANTIBODY SCREEN: CPT | Performed by: FAMILY MEDICINE

## 2019-01-01 PROCEDURE — 87581 M.PNEUMON DNA AMP PROBE: CPT | Performed by: FAMILY MEDICINE

## 2019-01-01 PROCEDURE — 87385 HISTOPLASMA CAPSUL AG IA: CPT | Performed by: INTERNAL MEDICINE

## 2019-01-01 PROCEDURE — 87040 BLOOD CULTURE FOR BACTERIA: CPT | Performed by: NURSE PRACTITIONER

## 2019-01-01 PROCEDURE — 97163 PT EVAL HIGH COMPLEX 45 MIN: CPT

## 2019-01-01 PROCEDURE — 84443 ASSAY THYROID STIM HORMONE: CPT | Performed by: INTERNAL MEDICINE

## 2019-01-01 PROCEDURE — 87340 HEPATITIS B SURFACE AG IA: CPT | Performed by: INTERNAL MEDICINE

## 2019-01-01 PROCEDURE — 83540 ASSAY OF IRON: CPT | Performed by: INTERNAL MEDICINE

## 2019-01-01 PROCEDURE — 25010000002 PROPOFOL 1000 MG/ML EMULSION: Performed by: ANESTHESIOLOGY

## 2019-01-01 PROCEDURE — 85025 COMPLETE CBC W/AUTO DIFF WBC: CPT | Performed by: EMERGENCY MEDICINE

## 2019-01-01 PROCEDURE — 86256 FLUORESCENT ANTIBODY TITER: CPT | Performed by: INTERNAL MEDICINE

## 2019-01-01 PROCEDURE — 84484 ASSAY OF TROPONIN QUANT: CPT | Performed by: INTERNAL MEDICINE

## 2019-01-01 PROCEDURE — 25010000002 FUROSEMIDE PER 20 MG: Performed by: INTERNAL MEDICINE

## 2019-01-01 PROCEDURE — 36430 TRANSFUSION BLD/BLD COMPNT: CPT

## 2019-01-01 PROCEDURE — 25010000002 HEPARIN (PORCINE) PER 1000 UNITS: Performed by: INTERNAL MEDICINE

## 2019-01-01 PROCEDURE — 86900 BLOOD TYPING SEROLOGIC ABO: CPT

## 2019-01-01 PROCEDURE — 87633 RESP VIRUS 12-25 TARGETS: CPT | Performed by: INTERNAL MEDICINE

## 2019-01-01 PROCEDURE — 86923 COMPATIBILITY TEST ELECTRIC: CPT

## 2019-01-01 PROCEDURE — 86698 HISTOPLASMA ANTIBODY: CPT | Performed by: INTERNAL MEDICINE

## 2019-01-01 PROCEDURE — 87205 SMEAR GRAM STAIN: CPT | Performed by: INTERNAL MEDICINE

## 2019-01-01 PROCEDURE — 76937 US GUIDE VASCULAR ACCESS: CPT

## 2019-01-01 PROCEDURE — 99233 SBSQ HOSP IP/OBS HIGH 50: CPT | Performed by: INTERNAL MEDICINE

## 2019-01-01 PROCEDURE — 94002 VENT MGMT INPAT INIT DAY: CPT

## 2019-01-01 PROCEDURE — 97116 GAIT TRAINING THERAPY: CPT

## 2019-01-01 PROCEDURE — 25010000002 METHYLPREDNISOLONE PER 125 MG: Performed by: ANESTHESIOLOGY

## 2019-01-01 PROCEDURE — 25010000002 ALBUMIN HUMAN 25% PER 50 ML: Performed by: INTERNAL MEDICINE

## 2019-01-01 PROCEDURE — 88108 CYTOPATH CONCENTRATE TECH: CPT | Performed by: INTERNAL MEDICINE

## 2019-01-01 PROCEDURE — 25010000002 EPOETIN ALFA-EPBX 10000 UNIT/ML SOLUTION: Performed by: INTERNAL MEDICINE

## 2019-01-01 PROCEDURE — 87530 HSV DNA QUANT: CPT | Performed by: FAMILY MEDICINE

## 2019-01-01 PROCEDURE — 25010000002 METHYLPREDNISOLONE PER 125 MG: Performed by: FAMILY MEDICINE

## 2019-01-01 PROCEDURE — 99285 EMERGENCY DEPT VISIT HI MDM: CPT

## 2019-01-01 PROCEDURE — 6A551Z3 PHERESIS OF PLASMA, MULTIPLE: ICD-10-PCS | Performed by: INTERNAL MEDICINE

## 2019-01-01 PROCEDURE — 82746 ASSAY OF FOLIC ACID SERUM: CPT | Performed by: NURSE PRACTITIONER

## 2019-01-01 PROCEDURE — 87040 BLOOD CULTURE FOR BACTERIA: CPT | Performed by: EMERGENCY MEDICINE

## 2019-01-01 PROCEDURE — C1751 CATH, INF, PER/CENT/MIDLINE: HCPCS

## 2019-01-01 PROCEDURE — 86901 BLOOD TYPING SEROLOGIC RH(D): CPT | Performed by: INTERNAL MEDICINE

## 2019-01-01 PROCEDURE — 86900 BLOOD TYPING SEROLOGIC ABO: CPT | Performed by: FAMILY MEDICINE

## 2019-01-01 PROCEDURE — 97535 SELF CARE MNGMENT TRAINING: CPT

## 2019-01-01 PROCEDURE — 84132 ASSAY OF SERUM POTASSIUM: CPT | Performed by: INTERNAL MEDICINE

## 2019-01-01 PROCEDURE — 5A1D70Z PERFORMANCE OF URINARY FILTRATION, INTERMITTENT, LESS THAN 6 HOURS PER DAY: ICD-10-PCS | Performed by: INTERNAL MEDICINE

## 2019-01-01 PROCEDURE — C1894 INTRO/SHEATH, NON-LASER: HCPCS

## 2019-01-01 PROCEDURE — 86900 BLOOD TYPING SEROLOGIC ABO: CPT | Performed by: INTERNAL MEDICINE

## 2019-01-01 PROCEDURE — 25010000002 MAGNESIUM SULFATE 2 GM/50ML SOLUTION: Performed by: INTERNAL MEDICINE

## 2019-01-01 PROCEDURE — 85730 THROMBOPLASTIN TIME PARTIAL: CPT | Performed by: EMERGENCY MEDICINE

## 2019-01-01 PROCEDURE — 25010000002 ONDANSETRON PER 1 MG: Performed by: INTERNAL MEDICINE

## 2019-01-01 PROCEDURE — 87486 CHLMYD PNEUM DNA AMP PROBE: CPT | Performed by: FAMILY MEDICINE

## 2019-01-01 PROCEDURE — 63710000001 PREDNISONE PER 1 MG: Performed by: INTERNAL MEDICINE

## 2019-01-01 PROCEDURE — 71046 X-RAY EXAM CHEST 2 VIEWS: CPT

## 2019-01-01 PROCEDURE — 99223 1ST HOSP IP/OBS HIGH 75: CPT | Performed by: INTERNAL MEDICINE

## 2019-01-01 PROCEDURE — 94640 AIRWAY INHALATION TREATMENT: CPT

## 2019-01-01 PROCEDURE — 74018 RADEX ABDOMEN 1 VIEW: CPT

## 2019-01-01 PROCEDURE — 25010000002 VANCOMYCIN 10 G RECONSTITUTED SOLUTION: Performed by: EMERGENCY MEDICINE

## 2019-01-01 PROCEDURE — 85610 PROTHROMBIN TIME: CPT | Performed by: EMERGENCY MEDICINE

## 2019-01-01 PROCEDURE — 5A1945Z RESPIRATORY VENTILATION, 24-96 CONSECUTIVE HOURS: ICD-10-PCS | Performed by: INTERNAL MEDICINE

## 2019-01-01 PROCEDURE — 25010000002 CEFEPIME PER 500 MG: Performed by: EMERGENCY MEDICINE

## 2019-01-01 PROCEDURE — C1752 CATH,HEMODIALYSIS,SHORT-TERM: HCPCS

## 2019-01-01 PROCEDURE — 83880 ASSAY OF NATRIURETIC PEPTIDE: CPT | Performed by: EMERGENCY MEDICINE

## 2019-01-01 PROCEDURE — 25010000002 CEFEPIME PER 500 MG: Performed by: INTERNAL MEDICINE

## 2019-01-01 PROCEDURE — 86706 HEP B SURFACE ANTIBODY: CPT | Performed by: INTERNAL MEDICINE

## 2019-01-01 PROCEDURE — 25010000002 IRON SUCROSE PER 1 MG: Performed by: INTERNAL MEDICINE

## 2019-01-01 PROCEDURE — 82728 ASSAY OF FERRITIN: CPT | Performed by: FAMILY MEDICINE

## 2019-01-01 PROCEDURE — 86665 EPSTEIN-BARR CAPSID VCA: CPT | Performed by: NURSE PRACTITIONER

## 2019-01-01 PROCEDURE — 87633 RESP VIRUS 12-25 TARGETS: CPT | Performed by: EMERGENCY MEDICINE

## 2019-01-01 PROCEDURE — 87798 DETECT AGENT NOS DNA AMP: CPT | Performed by: INTERNAL MEDICINE

## 2019-01-01 PROCEDURE — 83516 IMMUNOASSAY NONANTIBODY: CPT | Performed by: INTERNAL MEDICINE

## 2019-01-01 PROCEDURE — 83605 ASSAY OF LACTIC ACID: CPT

## 2019-01-01 PROCEDURE — 63710000001 DIPHENHYDRAMINE PER 50 MG: Performed by: FAMILY MEDICINE

## 2019-01-01 PROCEDURE — 84550 ASSAY OF BLOOD/URIC ACID: CPT | Performed by: INTERNAL MEDICINE

## 2019-01-01 PROCEDURE — 36514 APHERESIS PLASMA: CPT

## 2019-01-01 PROCEDURE — 76775 US EXAM ABDO BACK WALL LIM: CPT

## 2019-01-01 PROCEDURE — 83605 ASSAY OF LACTIC ACID: CPT | Performed by: INTERNAL MEDICINE

## 2019-01-01 PROCEDURE — 93306 TTE W/DOPPLER COMPLETE: CPT

## 2019-01-01 PROCEDURE — 84300 ASSAY OF URINE SODIUM: CPT | Performed by: INTERNAL MEDICINE

## 2019-01-01 PROCEDURE — 80048 BASIC METABOLIC PNL TOTAL CA: CPT | Performed by: INTERNAL MEDICINE

## 2019-01-01 PROCEDURE — 0 IOPAMIDOL PER 1 ML: Performed by: EMERGENCY MEDICINE

## 2019-01-01 PROCEDURE — 36415 COLL VENOUS BLD VENIPUNCTURE: CPT

## 2019-01-01 PROCEDURE — 86645 CMV ANTIBODY IGM: CPT | Performed by: NURSE PRACTITIONER

## 2019-01-01 PROCEDURE — 0BH17EZ INSERTION OF ENDOTRACHEAL AIRWAY INTO TRACHEA, VIA NATURAL OR ARTIFICIAL OPENING: ICD-10-PCS | Performed by: INTERNAL MEDICINE

## 2019-01-01 PROCEDURE — 87798 DETECT AGENT NOS DNA AMP: CPT | Performed by: FAMILY MEDICINE

## 2019-01-01 PROCEDURE — 25010000002 MIDAZOLAM PER 1 MG: Performed by: ANESTHESIOLOGY

## 2019-01-01 PROCEDURE — 0 IOPAMIDOL PER 1 ML: Performed by: FAMILY MEDICINE

## 2019-01-01 PROCEDURE — 99232 SBSQ HOSP IP/OBS MODERATE 35: CPT | Performed by: INTERNAL MEDICINE

## 2019-01-01 PROCEDURE — 87798 DETECT AGENT NOS DNA AMP: CPT | Performed by: EMERGENCY MEDICINE

## 2019-01-01 PROCEDURE — 87496 CYTOMEG DNA AMP PROBE: CPT | Performed by: INTERNAL MEDICINE

## 2019-01-01 PROCEDURE — 93306 TTE W/DOPPLER COMPLETE: CPT | Performed by: INTERNAL MEDICINE

## 2019-01-01 PROCEDURE — 25010000002 RITUXIMAB 10 MG/ML SOLUTION 50 ML VIAL: Performed by: INTERNAL MEDICINE

## 2019-01-01 PROCEDURE — 83880 ASSAY OF NATRIURETIC PEPTIDE: CPT | Performed by: FAMILY MEDICINE

## 2019-01-01 PROCEDURE — 87071 CULTURE AEROBIC QUANT OTHER: CPT | Performed by: INTERNAL MEDICINE

## 2019-01-01 PROCEDURE — 86901 BLOOD TYPING SEROLOGIC RH(D): CPT | Performed by: FAMILY MEDICINE

## 2019-01-01 PROCEDURE — 84478 ASSAY OF TRIGLYCERIDES: CPT | Performed by: INTERNAL MEDICINE

## 2019-01-01 PROCEDURE — 82607 VITAMIN B-12: CPT | Performed by: NURSE PRACTITIONER

## 2019-01-01 PROCEDURE — 0B9M8ZX DRAINAGE OF BILATERAL LUNGS, VIA NATURAL OR ARTIFICIAL OPENING ENDOSCOPIC, DIAGNOSTIC: ICD-10-PCS | Performed by: INTERNAL MEDICINE

## 2019-01-01 PROCEDURE — 02HV33Z INSERTION OF INFUSION DEVICE INTO SUPERIOR VENA CAVA, PERCUTANEOUS APPROACH: ICD-10-PCS | Performed by: INTERNAL MEDICINE

## 2019-01-01 PROCEDURE — 25010000002 ENOXAPARIN PER 10 MG: Performed by: INTERNAL MEDICINE

## 2019-01-01 PROCEDURE — 71275 CT ANGIOGRAPHY CHEST: CPT

## 2019-01-01 PROCEDURE — 84145 PROCALCITONIN (PCT): CPT | Performed by: FAMILY MEDICINE

## 2019-01-01 PROCEDURE — 97164 PT RE-EVAL EST PLAN CARE: CPT

## 2019-01-01 PROCEDURE — 80051 ELECTROLYTE PANEL: CPT

## 2019-01-01 PROCEDURE — 25010000002 HEPARIN (PORCINE) PER 1000 UNITS: Performed by: RADIOLOGY

## 2019-01-01 PROCEDURE — 87581 M.PNEUMON DNA AMP PROBE: CPT | Performed by: EMERGENCY MEDICINE

## 2019-01-01 PROCEDURE — 82330 ASSAY OF CALCIUM: CPT

## 2019-01-01 PROCEDURE — 81001 URINALYSIS AUTO W/SCOPE: CPT | Performed by: INTERNAL MEDICINE

## 2019-01-01 PROCEDURE — 25010000002 FUROSEMIDE PER 20 MG: Performed by: EMERGENCY MEDICINE

## 2019-01-01 PROCEDURE — 25010000002 FENTANYL CITRATE (PF) 100 MCG/2ML SOLUTION: Performed by: ANESTHESIOLOGY

## 2019-01-01 PROCEDURE — 80053 COMPREHEN METABOLIC PANEL: CPT | Performed by: EMERGENCY MEDICINE

## 2019-01-01 PROCEDURE — 80202 ASSAY OF VANCOMYCIN: CPT | Performed by: EMERGENCY MEDICINE

## 2019-01-01 PROCEDURE — 25010000002 METHYLPREDNISOLONE PER 125 MG: Performed by: EMERGENCY MEDICINE

## 2019-01-01 RX ORDER — METHYLPREDNISOLONE SODIUM SUCCINATE 40 MG/ML
40 INJECTION, POWDER, LYOPHILIZED, FOR SOLUTION INTRAMUSCULAR; INTRAVENOUS EVERY 12 HOURS
Status: DISCONTINUED | OUTPATIENT
Start: 2019-01-01 | End: 2019-01-01

## 2019-01-01 RX ORDER — HYDRALAZINE HYDROCHLORIDE 50 MG/1
75 TABLET, FILM COATED ORAL 3 TIMES DAILY
COMMUNITY

## 2019-01-01 RX ORDER — ACETAMINOPHEN 325 MG/1
TABLET ORAL
COMMUNITY
Start: 2019-01-01 | End: 2019-01-01

## 2019-01-01 RX ORDER — MORPHINE SULFATE 4 MG/ML
2 INJECTION, SOLUTION INTRAMUSCULAR; INTRAVENOUS ONCE AS NEEDED
Status: COMPLETED | OUTPATIENT
Start: 2019-01-01 | End: 2019-01-01

## 2019-01-01 RX ORDER — MORPHINE SULFATE 4 MG/ML
4 INJECTION, SOLUTION INTRAMUSCULAR; INTRAVENOUS EVERY 4 HOURS PRN
Status: DISPENSED | OUTPATIENT
Start: 2019-01-01 | End: 2019-01-01

## 2019-01-01 RX ORDER — VANCOMYCIN HYDROCHLORIDE
15 EVERY 24 HOURS
Status: DISCONTINUED | OUTPATIENT
Start: 2019-01-01 | End: 2019-01-01 | Stop reason: DRUGHIGH

## 2019-01-01 RX ORDER — ACETAMINOPHEN 650 MG/1
650 SUPPOSITORY RECTAL EVERY 4 HOURS PRN
Status: CANCELLED | OUTPATIENT
Start: 2019-01-01

## 2019-01-01 RX ORDER — ASPIRIN 81 MG/1
81 TABLET, CHEWABLE ORAL EVERY OTHER DAY
Status: DISCONTINUED | OUTPATIENT
Start: 2019-01-01 | End: 2019-01-01 | Stop reason: HOSPADM

## 2019-01-01 RX ORDER — CHLORHEXIDINE GLUCONATE 0.12 MG/ML
15 RINSE ORAL EVERY 12 HOURS SCHEDULED
Status: DISCONTINUED | OUTPATIENT
Start: 2019-01-01 | End: 2019-01-01

## 2019-01-01 RX ORDER — DIPHENHYDRAMINE HYDROCHLORIDE 50 MG/ML
12.5 INJECTION INTRAMUSCULAR; INTRAVENOUS ONCE
Status: DISCONTINUED | OUTPATIENT
Start: 2019-01-01 | End: 2019-01-01 | Stop reason: HOSPADM

## 2019-01-01 RX ORDER — POTASSIUM CHLORIDE 20 MEQ/1
40 TABLET, EXTENDED RELEASE ORAL ONCE
Status: DISCONTINUED | OUTPATIENT
Start: 2019-01-01 | End: 2019-01-01 | Stop reason: HOSPADM

## 2019-01-01 RX ORDER — SULFAMETHOXAZOLE AND TRIMETHOPRIM 400; 80 MG/1; MG/1
1 TABLET ORAL 3 TIMES WEEKLY
Status: DISCONTINUED | OUTPATIENT
Start: 2019-01-01 | End: 2019-01-01

## 2019-01-01 RX ORDER — FAMOTIDINE 20 MG/1
40 TABLET, FILM COATED ORAL DAILY
Status: CANCELLED | OUTPATIENT
Start: 2019-01-01

## 2019-01-01 RX ORDER — RIVAROXABAN 10 MG/1
TABLET, FILM COATED ORAL
Refills: 0 | Status: ON HOLD | COMMUNITY
Start: 2019-01-01 | End: 2019-01-01

## 2019-01-01 RX ORDER — METOPROLOL TARTRATE 50 MG/1
50 TABLET, FILM COATED ORAL EVERY 12 HOURS SCHEDULED
Status: DISCONTINUED | OUTPATIENT
Start: 2019-01-01 | End: 2019-01-01

## 2019-01-01 RX ORDER — ASPIRIN 81 MG/1
81 TABLET, CHEWABLE ORAL EVERY OTHER DAY
COMMUNITY

## 2019-01-01 RX ORDER — POTASSIUM CHLORIDE 20 MEQ/1
40 TABLET, EXTENDED RELEASE ORAL
Status: ACTIVE | OUTPATIENT
Start: 2019-01-01 | End: 2019-01-01

## 2019-01-01 RX ORDER — METHYLPREDNISOLONE SODIUM SUCCINATE 500 MG/8ML
INJECTION INTRAMUSCULAR; INTRAVENOUS AS NEEDED
Status: DISCONTINUED | OUTPATIENT
Start: 2019-01-01 | End: 2019-01-01 | Stop reason: SURG

## 2019-01-01 RX ORDER — BUMETANIDE 0.25 MG/ML
1 INJECTION INTRAMUSCULAR; INTRAVENOUS ONCE
Status: COMPLETED | OUTPATIENT
Start: 2019-01-01 | End: 2019-01-01

## 2019-01-01 RX ORDER — MAGNESIUM SULFATE 1 G/100ML
1 INJECTION INTRAVENOUS ONCE
Status: COMPLETED | OUTPATIENT
Start: 2019-01-01 | End: 2019-01-01

## 2019-01-01 RX ORDER — PROMETHAZINE HYDROCHLORIDE 25 MG/ML
12.5 INJECTION, SOLUTION INTRAMUSCULAR; INTRAVENOUS EVERY 6 HOURS PRN
Status: CANCELLED | OUTPATIENT
Start: 2019-01-01

## 2019-01-01 RX ORDER — ACETAMINOPHEN 325 MG/1
650 TABLET ORAL ONCE
Status: DISCONTINUED | OUTPATIENT
Start: 2019-01-01 | End: 2019-01-01 | Stop reason: HOSPADM

## 2019-01-01 RX ORDER — MORPHINE SULFATE 4 MG/ML
2 INJECTION, SOLUTION INTRAMUSCULAR; INTRAVENOUS
Status: DISCONTINUED | OUTPATIENT
Start: 2019-01-01 | End: 2019-01-01 | Stop reason: HOSPADM

## 2019-01-01 RX ORDER — BUMETANIDE 0.25 MG/ML
2 INJECTION INTRAMUSCULAR; INTRAVENOUS EVERY 12 HOURS
Status: DISCONTINUED | OUTPATIENT
Start: 2019-01-01 | End: 2019-01-01

## 2019-01-01 RX ORDER — POTASSIUM CHLORIDE 1.5 G/1.77G
40 POWDER, FOR SOLUTION ORAL AS NEEDED
Status: DISCONTINUED | OUTPATIENT
Start: 2019-01-01 | End: 2019-01-01

## 2019-01-01 RX ORDER — SODIUM CHLORIDE FOR INHALATION 0.9 %
3 VIAL, NEBULIZER (ML) INHALATION
Status: DISCONTINUED | OUTPATIENT
Start: 2019-01-01 | End: 2019-01-01

## 2019-01-01 RX ORDER — MELATONIN
500 DAILY
Status: DISCONTINUED | OUTPATIENT
Start: 2019-01-01 | End: 2019-01-01 | Stop reason: HOSPADM

## 2019-01-01 RX ORDER — ACETAMINOPHEN,DIPHENHYDRAMINE HCL 500; 25 MG/1; MG/1
1 TABLET, FILM COATED ORAL NIGHTLY PRN
COMMUNITY

## 2019-01-01 RX ORDER — PANTOPRAZOLE SODIUM 40 MG/1
40 TABLET, DELAYED RELEASE ORAL
Status: DISCONTINUED | OUTPATIENT
Start: 2019-01-01 | End: 2019-01-01

## 2019-01-01 RX ORDER — ACETAMINOPHEN 325 MG/1
650 TABLET ORAL EVERY 4 HOURS PRN
Status: CANCELLED | OUTPATIENT
Start: 2019-01-01

## 2019-01-01 RX ORDER — ALBUMIN (HUMAN) 12.5 G/50ML
12.5 SOLUTION INTRAVENOUS AS NEEDED
Status: CANCELLED | OUTPATIENT
Start: 2019-01-01 | End: 2019-01-01

## 2019-01-01 RX ORDER — CALCIUM GLUCONATE 20 MG/ML
1 INJECTION, SOLUTION INTRAVENOUS EVERY 12 HOURS
Status: COMPLETED | OUTPATIENT
Start: 2019-01-01 | End: 2019-01-01

## 2019-01-01 RX ORDER — ANTICOAGULANT CITRATE DEXTROSE SOLUTION FORMULA A 12.25; 11; 3.65 G/500ML; G/500ML; G/500ML
1000 SOLUTION INTRAVENOUS DAILY
Status: COMPLETED | OUTPATIENT
Start: 2019-01-01 | End: 2019-01-01

## 2019-01-01 RX ORDER — ACETAMINOPHEN 500 MG
1000 TABLET ORAL ONCE
Status: COMPLETED | OUTPATIENT
Start: 2019-01-01 | End: 2019-01-01

## 2019-01-01 RX ORDER — POTASSIUM CHLORIDE 20 MEQ/1
20 TABLET, EXTENDED RELEASE ORAL EVERY 12 HOURS SCHEDULED
Status: DISCONTINUED | OUTPATIENT
Start: 2019-01-01 | End: 2019-01-01 | Stop reason: HOSPADM

## 2019-01-01 RX ORDER — ALBUTEROL SULFATE 2.5 MG/3ML
2.5 SOLUTION RESPIRATORY (INHALATION)
Status: DISCONTINUED | OUTPATIENT
Start: 2019-01-01 | End: 2019-01-01

## 2019-01-01 RX ORDER — PANTOPRAZOLE SODIUM 40 MG/10ML
40 INJECTION, POWDER, LYOPHILIZED, FOR SOLUTION INTRAVENOUS
Status: DISCONTINUED | OUTPATIENT
Start: 2019-01-01 | End: 2019-01-01 | Stop reason: HOSPADM

## 2019-01-01 RX ORDER — METHYLPREDNISOLONE SODIUM SUCCINATE 125 MG/2ML
125 INJECTION, POWDER, LYOPHILIZED, FOR SOLUTION INTRAMUSCULAR; INTRAVENOUS ONCE
Status: COMPLETED | OUTPATIENT
Start: 2019-01-01 | End: 2019-01-01

## 2019-01-01 RX ORDER — ITRACONAZOLE 100 MG/1
200 CAPSULE ORAL 2 TIMES DAILY WITH MEALS
Status: DISCONTINUED | OUTPATIENT
Start: 2019-01-01 | End: 2019-01-01

## 2019-01-01 RX ORDER — ALBUMIN (HUMAN) 12.5 G/50ML
25 SOLUTION INTRAVENOUS EVERY 8 HOURS
Status: COMPLETED | OUTPATIENT
Start: 2019-01-01 | End: 2019-01-01

## 2019-01-01 RX ORDER — ALPRAZOLAM 0.25 MG/1
0.25 TABLET ORAL 2 TIMES DAILY
Status: DISCONTINUED | OUTPATIENT
Start: 2019-01-01 | End: 2019-01-01 | Stop reason: HOSPADM

## 2019-01-01 RX ORDER — PREDNISONE 10 MG/1
10 TABLET ORAL DAILY
Status: DISCONTINUED | OUTPATIENT
Start: 2019-01-01 | End: 2019-01-01

## 2019-01-01 RX ORDER — ACETAMINOPHEN 325 MG/1
650 TABLET ORAL ONCE
Status: COMPLETED | OUTPATIENT
Start: 2019-01-01 | End: 2019-01-01

## 2019-01-01 RX ORDER — ALBUTEROL SULFATE 2.5 MG/3ML
2.5 SOLUTION RESPIRATORY (INHALATION)
Status: DISCONTINUED | OUTPATIENT
Start: 2019-01-01 | End: 2019-01-01 | Stop reason: SDUPTHER

## 2019-01-01 RX ORDER — DIPHENHYDRAMINE HCL 25 MG
25 TABLET ORAL ONCE
Status: COMPLETED | OUTPATIENT
Start: 2019-01-01 | End: 2019-01-01

## 2019-01-01 RX ORDER — LORAZEPAM 2 MG/ML
1 INJECTION INTRAMUSCULAR
Status: DISCONTINUED | OUTPATIENT
Start: 2019-01-01 | End: 2019-01-01 | Stop reason: HOSPADM

## 2019-01-01 RX ORDER — MAGNESIUM SULFATE HEPTAHYDRATE 40 MG/ML
2 INJECTION, SOLUTION INTRAVENOUS AS NEEDED
Status: DISCONTINUED | OUTPATIENT
Start: 2019-01-01 | End: 2019-01-01

## 2019-01-01 RX ORDER — IRON POLYSACCH/IRON HEME POLYP 28 MG
TABLET ORAL DAILY
Refills: 1 | COMMUNITY
Start: 2019-01-01

## 2019-01-01 RX ORDER — RISPERIDONE 0.25 MG/1
0.5 TABLET ORAL 2 TIMES DAILY PRN
Status: DISCONTINUED | OUTPATIENT
Start: 2019-01-01 | End: 2019-01-01 | Stop reason: HOSPADM

## 2019-01-01 RX ORDER — MORPHINE SULFATE 4 MG/ML
INJECTION, SOLUTION INTRAMUSCULAR; INTRAVENOUS
Status: COMPLETED
Start: 2019-01-01 | End: 2019-01-01

## 2019-01-01 RX ORDER — PROPOFOL 10 MG/ML
INJECTION, EMULSION INTRAVENOUS AS NEEDED
Status: DISCONTINUED | OUTPATIENT
Start: 2019-01-01 | End: 2019-01-01 | Stop reason: SURG

## 2019-01-01 RX ORDER — METHYLPREDNISOLONE SODIUM SUCCINATE 125 MG/2ML
125 INJECTION, POWDER, LYOPHILIZED, FOR SOLUTION INTRAMUSCULAR; INTRAVENOUS EVERY 6 HOURS
Status: DISCONTINUED | OUTPATIENT
Start: 2019-01-01 | End: 2019-01-01 | Stop reason: HOSPADM

## 2019-01-01 RX ORDER — HYDRALAZINE HYDROCHLORIDE 50 MG/1
TABLET, FILM COATED ORAL
Refills: 1 | COMMUNITY
Start: 2019-01-01 | End: 2019-01-01

## 2019-01-01 RX ORDER — GLYCOPYRROLATE 0.2 MG/ML
INJECTION INTRAMUSCULAR; INTRAVENOUS AS NEEDED
Status: DISCONTINUED | OUTPATIENT
Start: 2019-01-01 | End: 2019-01-01 | Stop reason: SURG

## 2019-01-01 RX ORDER — MORPHINE SULFATE 4 MG/ML
2 INJECTION, SOLUTION INTRAMUSCULAR; INTRAVENOUS EVERY 4 HOURS PRN
Status: DISCONTINUED | OUTPATIENT
Start: 2019-01-01 | End: 2019-01-01

## 2019-01-01 RX ORDER — ONDANSETRON 2 MG/ML
4 INJECTION INTRAMUSCULAR; INTRAVENOUS EVERY 6 HOURS PRN
Status: DISCONTINUED | OUTPATIENT
Start: 2019-01-01 | End: 2019-01-01 | Stop reason: HOSPADM

## 2019-01-01 RX ORDER — METOPROLOL TARTRATE 5 MG/5ML
5 INJECTION INTRAVENOUS EVERY 4 HOURS
Status: DISCONTINUED | OUTPATIENT
Start: 2019-01-01 | End: 2019-01-01

## 2019-01-01 RX ORDER — HEPARIN SODIUM 1000 [USP'U]/ML
3000 INJECTION, SOLUTION INTRAVENOUS; SUBCUTANEOUS ONCE
Status: COMPLETED | OUTPATIENT
Start: 2019-01-01 | End: 2019-01-01

## 2019-01-01 RX ORDER — ITRACONAZOLE 100 MG/1
200 CAPSULE ORAL
Status: DISPENSED | OUTPATIENT
Start: 2019-01-01 | End: 2019-01-01

## 2019-01-01 RX ORDER — DILTIAZEM HYDROCHLORIDE 5 MG/ML
INJECTION INTRAVENOUS
Status: COMPLETED
Start: 2019-01-01 | End: 2019-01-01

## 2019-01-01 RX ORDER — AMLODIPINE BESYLATE 5 MG/1
5 TABLET ORAL
Status: DISCONTINUED | OUTPATIENT
Start: 2019-01-01 | End: 2019-01-01

## 2019-01-01 RX ORDER — SODIUM CHLORIDE 0.9 % (FLUSH) 0.9 %
3-10 SYRINGE (ML) INJECTION AS NEEDED
Status: DISCONTINUED | OUTPATIENT
Start: 2019-01-01 | End: 2019-01-01 | Stop reason: HOSPADM

## 2019-01-01 RX ORDER — SODIUM CHLORIDE 0.9 % (FLUSH) 0.9 %
10 SYRINGE (ML) INJECTION EVERY 12 HOURS SCHEDULED
Status: DISCONTINUED | OUTPATIENT
Start: 2019-01-01 | End: 2019-01-01 | Stop reason: HOSPADM

## 2019-01-01 RX ORDER — SODIUM CHLORIDE 0.9 % (FLUSH) 0.9 %
3 SYRINGE (ML) INJECTION EVERY 12 HOURS SCHEDULED
Status: CANCELLED | OUTPATIENT
Start: 2019-01-01

## 2019-01-01 RX ORDER — BUMETANIDE 0.25 MG/ML
0.5 INJECTION INTRAMUSCULAR; INTRAVENOUS EVERY 8 HOURS
Status: COMPLETED | OUTPATIENT
Start: 2019-01-01 | End: 2019-01-01

## 2019-01-01 RX ORDER — SODIUM CHLORIDE 0.9 % (FLUSH) 0.9 %
3-10 SYRINGE (ML) INJECTION AS NEEDED
Status: CANCELLED | OUTPATIENT
Start: 2019-01-01

## 2019-01-01 RX ORDER — DILTIAZEM HYDROCHLORIDE 5 MG/ML
20 INJECTION INTRAVENOUS ONCE
Status: DISCONTINUED | OUTPATIENT
Start: 2019-01-01 | End: 2019-01-01 | Stop reason: HOSPADM

## 2019-01-01 RX ORDER — METHYLPREDNISOLONE SODIUM SUCCINATE 125 MG/2ML
80 INJECTION, POWDER, LYOPHILIZED, FOR SOLUTION INTRAMUSCULAR; INTRAVENOUS EVERY 6 HOURS
Status: DISCONTINUED | OUTPATIENT
Start: 2019-01-01 | End: 2019-01-01

## 2019-01-01 RX ORDER — ALPRAZOLAM 0.25 MG/1
0.25 TABLET ORAL 2 TIMES DAILY PRN
Status: DISCONTINUED | OUTPATIENT
Start: 2019-01-01 | End: 2019-01-01

## 2019-01-01 RX ORDER — IPRATROPIUM BROMIDE AND ALBUTEROL SULFATE 2.5; .5 MG/3ML; MG/3ML
3 SOLUTION RESPIRATORY (INHALATION)
Status: DISCONTINUED | OUTPATIENT
Start: 2019-01-01 | End: 2019-01-01

## 2019-01-01 RX ORDER — ALBUMIN (HUMAN) 12.5 G/50ML
12.5 SOLUTION INTRAVENOUS AS NEEDED
Status: ACTIVE | OUTPATIENT
Start: 2019-01-01 | End: 2019-01-01

## 2019-01-01 RX ORDER — PREDNISONE 1 MG/1
5 TABLET ORAL DAILY
Status: DISCONTINUED | OUTPATIENT
Start: 2019-01-01 | End: 2019-01-01

## 2019-01-01 RX ORDER — ACETAMINOPHEN 650 MG/1
650 SUPPOSITORY RECTAL ONCE
Status: COMPLETED | OUTPATIENT
Start: 2019-01-01 | End: 2019-01-01

## 2019-01-01 RX ORDER — METHYLPREDNISOLONE SODIUM SUCCINATE 125 MG/2ML
125 INJECTION, POWDER, LYOPHILIZED, FOR SOLUTION INTRAMUSCULAR; INTRAVENOUS EVERY 6 HOURS
Status: DISCONTINUED | OUTPATIENT
Start: 2019-01-01 | End: 2019-01-01

## 2019-01-01 RX ORDER — GUAIFENESIN/DEXTROMETHORPHAN 100-10MG/5
10 SYRUP ORAL EVERY 6 HOURS PRN
Status: DISCONTINUED | OUTPATIENT
Start: 2019-01-01 | End: 2019-01-01

## 2019-01-01 RX ORDER — FUROSEMIDE 10 MG/ML
80 INJECTION INTRAMUSCULAR; INTRAVENOUS ONCE
Status: COMPLETED | OUTPATIENT
Start: 2019-01-01 | End: 2019-01-01

## 2019-01-01 RX ORDER — SODIUM CHLORIDE 9 MG/ML
50 INJECTION, SOLUTION INTRAVENOUS ONCE
Status: COMPLETED | OUTPATIENT
Start: 2019-01-01 | End: 2019-01-01

## 2019-01-01 RX ORDER — NITROGLYCERIN 0.4 MG/1
0.4 TABLET SUBLINGUAL
Status: CANCELLED | OUTPATIENT
Start: 2019-01-01

## 2019-01-01 RX ORDER — DEXTROSE MONOHYDRATE 25 G/50ML
25 INJECTION, SOLUTION INTRAVENOUS
Status: DISCONTINUED | OUTPATIENT
Start: 2019-01-01 | End: 2019-01-01 | Stop reason: HOSPADM

## 2019-01-01 RX ORDER — DILTIAZEM HCL IN NACL,ISO-OSM 125 MG/125
5-15 PLASTIC BAG, INJECTION (ML) INTRAVENOUS
Status: DISCONTINUED | OUTPATIENT
Start: 2019-01-01 | End: 2019-01-01 | Stop reason: HOSPADM

## 2019-01-01 RX ORDER — AMLODIPINE BESYLATE 5 MG/1
5 TABLET ORAL ONCE
Status: COMPLETED | OUTPATIENT
Start: 2019-01-01 | End: 2019-01-01

## 2019-01-01 RX ORDER — ATENOLOL 25 MG/1
TABLET ORAL
Refills: 3 | COMMUNITY
Start: 2019-01-01

## 2019-01-01 RX ORDER — VANCOMYCIN HYDROCHLORIDE
20 ONCE
Status: COMPLETED | OUTPATIENT
Start: 2019-01-01 | End: 2019-01-01

## 2019-01-01 RX ORDER — METOPROLOL TARTRATE 5 MG/5ML
5 INJECTION INTRAVENOUS EVERY 6 HOURS PRN
Status: DISCONTINUED | OUTPATIENT
Start: 2019-01-01 | End: 2019-01-01

## 2019-01-01 RX ORDER — DIPHENHYDRAMINE HCL 25 MG
25 TABLET ORAL NIGHTLY PRN
Status: CANCELLED | OUTPATIENT
Start: 2019-01-01

## 2019-01-01 RX ORDER — ALPRAZOLAM 0.5 MG/1
0.5 TABLET ORAL 2 TIMES DAILY
Status: DISCONTINUED | OUTPATIENT
Start: 2019-01-01 | End: 2019-01-01

## 2019-01-01 RX ORDER — METHYLPREDNISOLONE SODIUM SUCCINATE 125 MG/2ML
80 INJECTION, POWDER, LYOPHILIZED, FOR SOLUTION INTRAMUSCULAR; INTRAVENOUS EVERY 8 HOURS
Status: DISCONTINUED | OUTPATIENT
Start: 2019-01-01 | End: 2019-01-01

## 2019-01-01 RX ORDER — MIDAZOLAM HYDROCHLORIDE 1 MG/ML
1 INJECTION INTRAMUSCULAR; INTRAVENOUS
Status: DISCONTINUED | OUTPATIENT
Start: 2019-01-01 | End: 2019-01-01

## 2019-01-01 RX ORDER — SODIUM CHLORIDE 0.9 % (FLUSH) 0.9 %
3 SYRINGE (ML) INJECTION EVERY 12 HOURS SCHEDULED
Status: DISCONTINUED | OUTPATIENT
Start: 2019-01-01 | End: 2019-01-01 | Stop reason: HOSPADM

## 2019-01-01 RX ORDER — BUMETANIDE 0.25 MG/ML
1 INJECTION INTRAMUSCULAR; INTRAVENOUS EVERY 12 HOURS SCHEDULED
Status: DISCONTINUED | OUTPATIENT
Start: 2019-01-01 | End: 2019-01-01 | Stop reason: HOSPADM

## 2019-01-01 RX ORDER — SODIUM BICARBONATE 650 MG/1
TABLET ORAL
Refills: 1 | Status: ON HOLD | COMMUNITY
Start: 2019-01-01 | End: 2019-01-01

## 2019-01-01 RX ORDER — ALBUMIN (HUMAN) 12.5 G/50ML
12.5 SOLUTION INTRAVENOUS AS NEEDED
Status: DISCONTINUED | OUTPATIENT
Start: 2019-01-01 | End: 2019-01-01 | Stop reason: HOSPADM

## 2019-01-01 RX ORDER — HYDROCODONE BITARTRATE AND ACETAMINOPHEN 7.5; 325 MG/1; MG/1
2 TABLET ORAL EVERY 4 HOURS PRN
Status: CANCELLED | OUTPATIENT
Start: 2019-01-01 | End: 2019-01-01

## 2019-01-01 RX ORDER — OXYCODONE HYDROCHLORIDE AND ACETAMINOPHEN 5; 325 MG/1; MG/1
1 TABLET ORAL EVERY 6 HOURS PRN
COMMUNITY

## 2019-01-01 RX ORDER — SODIUM CHLORIDE 0.9 % (FLUSH) 0.9 %
10 SYRINGE (ML) INJECTION EVERY 12 HOURS SCHEDULED
Status: DISCONTINUED | OUTPATIENT
Start: 2019-01-01 | End: 2019-01-01 | Stop reason: SDUPTHER

## 2019-01-01 RX ORDER — PROMETHAZINE HYDROCHLORIDE 12.5 MG/1
12.5 SUPPOSITORY RECTAL EVERY 6 HOURS PRN
Status: CANCELLED | OUTPATIENT
Start: 2019-01-01

## 2019-01-01 RX ORDER — DIPHENHYDRAMINE HYDROCHLORIDE 50 MG/ML
50 INJECTION INTRAMUSCULAR; INTRAVENOUS ONCE AS NEEDED
Status: DISCONTINUED | OUTPATIENT
Start: 2019-01-01 | End: 2019-01-01 | Stop reason: HOSPADM

## 2019-01-01 RX ORDER — POTASSIUM CHLORIDE 20 MEQ/1
40 TABLET, EXTENDED RELEASE ORAL AS NEEDED
Status: DISCONTINUED | OUTPATIENT
Start: 2019-01-01 | End: 2019-01-01

## 2019-01-01 RX ORDER — IPRATROPIUM BROMIDE AND ALBUTEROL SULFATE 2.5; .5 MG/3ML; MG/3ML
3 SOLUTION RESPIRATORY (INHALATION) EVERY 6 HOURS PRN
Status: DISCONTINUED | OUTPATIENT
Start: 2019-01-01 | End: 2019-01-01

## 2019-01-01 RX ORDER — ALPRAZOLAM 1 MG/1
1 TABLET ORAL 2 TIMES DAILY
Status: DISCONTINUED | OUTPATIENT
Start: 2019-01-01 | End: 2019-01-01

## 2019-01-01 RX ORDER — SODIUM CHLORIDE 0.9 % (FLUSH) 0.9 %
10 SYRINGE (ML) INJECTION AS NEEDED
Status: DISCONTINUED | OUTPATIENT
Start: 2019-01-01 | End: 2019-01-01 | Stop reason: HOSPADM

## 2019-01-01 RX ORDER — MORPHINE SULFATE 4 MG/ML
2 INJECTION, SOLUTION INTRAMUSCULAR; INTRAVENOUS ONCE
Status: COMPLETED | OUTPATIENT
Start: 2019-01-01 | End: 2019-01-01

## 2019-01-01 RX ORDER — CYCLOPHOSPHAMIDE 50 MG/1
50 CAPSULE ORAL DAILY
Status: DISCONTINUED | OUTPATIENT
Start: 2019-01-01 | End: 2019-01-01

## 2019-01-01 RX ORDER — OXYCODONE HYDROCHLORIDE AND ACETAMINOPHEN 5; 325 MG/1; MG/1
TABLET ORAL
Refills: 0 | COMMUNITY
Start: 2019-01-01 | End: 2019-01-01

## 2019-01-01 RX ORDER — ATENOLOL 25 MG/1
25 TABLET ORAL EVERY 12 HOURS SCHEDULED
Status: DISCONTINUED | OUTPATIENT
Start: 2019-01-01 | End: 2019-01-01

## 2019-01-01 RX ORDER — FENTANYL CITRATE 50 UG/ML
50 INJECTION, SOLUTION INTRAMUSCULAR; INTRAVENOUS
Status: DISCONTINUED | OUTPATIENT
Start: 2019-01-01 | End: 2019-01-01

## 2019-01-01 RX ORDER — ALBUMIN (HUMAN) 12.5 G/50ML
12.5 SOLUTION INTRAVENOUS AS NEEDED
Status: DISPENSED | OUTPATIENT
Start: 2019-01-01 | End: 2019-01-01

## 2019-01-01 RX ORDER — ALBUTEROL SULFATE 2.5 MG/3ML
2.5 SOLUTION RESPIRATORY (INHALATION) ONCE
Status: COMPLETED | OUTPATIENT
Start: 2019-01-01 | End: 2019-01-01

## 2019-01-01 RX ORDER — SODIUM CHLORIDE 0.9 % (FLUSH) 0.9 %
20 SYRINGE (ML) INJECTION AS NEEDED
Status: DISCONTINUED | OUTPATIENT
Start: 2019-01-01 | End: 2019-01-01 | Stop reason: HOSPADM

## 2019-01-01 RX ORDER — AMLODIPINE BESYLATE 5 MG/1
10 TABLET ORAL
Status: DISCONTINUED | OUTPATIENT
Start: 2019-01-01 | End: 2019-01-01

## 2019-01-01 RX ORDER — GUAIFENESIN 600 MG/1
1200 TABLET, EXTENDED RELEASE ORAL EVERY 12 HOURS
Status: DISCONTINUED | OUTPATIENT
Start: 2019-01-01 | End: 2019-01-01

## 2019-01-01 RX ORDER — PREDNISONE 20 MG/1
40 TABLET ORAL
Status: DISCONTINUED | OUTPATIENT
Start: 2019-01-01 | End: 2019-01-01

## 2019-01-01 RX ORDER — NICOTINE POLACRILEX 4 MG
15 LOZENGE BUCCAL
Status: DISCONTINUED | OUTPATIENT
Start: 2019-01-01 | End: 2019-01-01 | Stop reason: HOSPADM

## 2019-01-01 RX ORDER — SODIUM CHLORIDE, SODIUM LACTATE, POTASSIUM CHLORIDE, CALCIUM CHLORIDE 600; 310; 30; 20 MG/100ML; MG/100ML; MG/100ML; MG/100ML
INJECTION, SOLUTION INTRAVENOUS CONTINUOUS PRN
Status: DISCONTINUED | OUTPATIENT
Start: 2019-01-01 | End: 2019-01-01 | Stop reason: SURG

## 2019-01-01 RX ORDER — HEPARIN SODIUM 1000 [USP'U]/ML
INJECTION, SOLUTION INTRAVENOUS; SUBCUTANEOUS
Status: COMPLETED | OUTPATIENT
Start: 2019-01-01 | End: 2019-01-01

## 2019-01-01 RX ORDER — FAMOTIDINE 10 MG/ML
20 INJECTION, SOLUTION INTRAVENOUS ONCE AS NEEDED
Status: DISCONTINUED | OUTPATIENT
Start: 2019-01-01 | End: 2019-01-01 | Stop reason: HOSPADM

## 2019-01-01 RX ORDER — HYDRALAZINE HYDROCHLORIDE 25 MG/1
75 TABLET, FILM COATED ORAL 3 TIMES DAILY
Status: DISCONTINUED | OUTPATIENT
Start: 2019-01-01 | End: 2019-01-01

## 2019-01-01 RX ORDER — BISACODYL 10 MG
10 SUPPOSITORY, RECTAL RECTAL DAILY PRN
Status: CANCELLED | OUTPATIENT
Start: 2019-01-01

## 2019-01-01 RX ORDER — MAGNESIUM SULFATE 1 G/100ML
1 INJECTION INTRAVENOUS EVERY 12 HOURS
Status: COMPLETED | OUTPATIENT
Start: 2019-01-01 | End: 2019-01-01

## 2019-01-01 RX ORDER — HEPARIN SODIUM 1000 [USP'U]/ML
4000 INJECTION, SOLUTION INTRAVENOUS; SUBCUTANEOUS AS NEEDED
Status: DISCONTINUED | OUTPATIENT
Start: 2019-01-01 | End: 2019-01-01 | Stop reason: HOSPADM

## 2019-01-01 RX ORDER — FUROSEMIDE 10 MG/ML
40 INJECTION INTRAMUSCULAR; INTRAVENOUS ONCE
Status: COMPLETED | OUTPATIENT
Start: 2019-01-01 | End: 2019-01-01

## 2019-01-01 RX ORDER — BUMETANIDE 0.25 MG/ML
0.5 INJECTION INTRAMUSCULAR; INTRAVENOUS EVERY 8 HOURS
Status: DISCONTINUED | OUTPATIENT
Start: 2019-01-01 | End: 2019-01-01

## 2019-01-01 RX ORDER — MAGNESIUM HYDROXIDE 1200 MG/15ML
LIQUID ORAL AS NEEDED
Status: DISCONTINUED | OUTPATIENT
Start: 2019-01-01 | End: 2019-01-01 | Stop reason: HOSPADM

## 2019-01-01 RX ORDER — SODIUM CHLORIDE 9 MG/ML
100 INJECTION, SOLUTION INTRAVENOUS CONTINUOUS
Status: CANCELLED | OUTPATIENT
Start: 2019-01-01

## 2019-01-01 RX ORDER — LIDOCAINE HYDROCHLORIDE 20 MG/ML
INJECTION, SOLUTION INFILTRATION; PERINEURAL AS NEEDED
Status: DISCONTINUED | OUTPATIENT
Start: 2019-01-01 | End: 2019-01-01 | Stop reason: HOSPADM

## 2019-01-01 RX ORDER — MAGNESIUM SULFATE HEPTAHYDRATE 40 MG/ML
4 INJECTION, SOLUTION INTRAVENOUS AS NEEDED
Status: DISCONTINUED | OUTPATIENT
Start: 2019-01-01 | End: 2019-01-01

## 2019-01-01 RX ORDER — METHYLPREDNISOLONE 4 MG/1
TABLET ORAL
Refills: 0 | COMMUNITY
Start: 2019-01-01

## 2019-01-01 RX ORDER — PREDNISONE 20 MG/1
20 TABLET ORAL DAILY
Status: DISCONTINUED | OUTPATIENT
Start: 2019-01-01 | End: 2019-01-01

## 2019-01-01 RX ORDER — LORAZEPAM 2 MG/ML
1 INJECTION INTRAMUSCULAR ONCE
Status: COMPLETED | OUTPATIENT
Start: 2019-01-01 | End: 2019-01-01

## 2019-01-01 RX ORDER — SULFAMETHOXAZOLE AND TRIMETHOPRIM 400; 80 MG/1; MG/1
1 TABLET ORAL 3 TIMES WEEKLY
Status: DISCONTINUED | OUTPATIENT
Start: 2019-01-01 | End: 2019-01-01 | Stop reason: HOSPADM

## 2019-01-01 RX ORDER — PROMETHAZINE HYDROCHLORIDE 25 MG/1
12.5 TABLET ORAL EVERY 6 HOURS PRN
Status: CANCELLED | OUTPATIENT
Start: 2019-01-01

## 2019-01-01 RX ORDER — METOPROLOL TARTRATE 5 MG/5ML
5 INJECTION INTRAVENOUS EVERY 4 HOURS PRN
Status: DISCONTINUED | OUTPATIENT
Start: 2019-01-01 | End: 2019-01-01 | Stop reason: HOSPADM

## 2019-01-01 RX ORDER — PANTOPRAZOLE SODIUM 40 MG/10ML
40 INJECTION, POWDER, LYOPHILIZED, FOR SOLUTION INTRAVENOUS
Status: DISCONTINUED | OUTPATIENT
Start: 2019-01-01 | End: 2019-01-01

## 2019-01-01 RX ORDER — GUAIFENESIN 600 MG/1
600 TABLET, EXTENDED RELEASE ORAL EVERY 12 HOURS SCHEDULED
Status: DISCONTINUED | OUTPATIENT
Start: 2019-01-01 | End: 2019-01-01 | Stop reason: HOSPADM

## 2019-01-01 RX ADMIN — METOPROLOL TARTRATE 50 MG: 50 TABLET, FILM COATED ORAL at 21:00

## 2019-01-01 RX ADMIN — GUAIFENESIN 400 MG: 100 SOLUTION ORAL at 03:00

## 2019-01-01 RX ADMIN — CYCLOPHOSPHAMIDE 50 MG: 50 CAPSULE ORAL at 08:48

## 2019-01-01 RX ADMIN — METOPROLOL TARTRATE 5 MG: 5 INJECTION INTRAVENOUS at 18:33

## 2019-01-01 RX ADMIN — CEFEPIME HYDROCHLORIDE 2 G: 2 INJECTION, POWDER, FOR SOLUTION INTRAVENOUS at 01:21

## 2019-01-01 RX ADMIN — INSULIN LISPRO 4 UNITS: 100 INJECTION, SOLUTION INTRAVENOUS; SUBCUTANEOUS at 13:26

## 2019-01-01 RX ADMIN — HYDRALAZINE HYDROCHLORIDE 75 MG: 25 TABLET, FILM COATED ORAL at 08:10

## 2019-01-01 RX ADMIN — ALBUTEROL SULFATE 2.5 MG: 2.5 SOLUTION RESPIRATORY (INHALATION) at 00:19

## 2019-01-01 RX ADMIN — PROPOFOL 170 MG: 10 INJECTION, EMULSION INTRAVENOUS at 09:35

## 2019-01-01 RX ADMIN — IPRATROPIUM BROMIDE AND ALBUTEROL SULFATE 3 ML: .5; 3 SOLUTION RESPIRATORY (INHALATION) at 13:30

## 2019-01-01 RX ADMIN — Medication 10 ML: at 21:02

## 2019-01-01 RX ADMIN — METOPROLOL TARTRATE 50 MG: 50 TABLET, FILM COATED ORAL at 10:04

## 2019-01-01 RX ADMIN — GUAIFENESIN 400 MG: 100 SOLUTION ORAL at 19:35

## 2019-01-01 RX ADMIN — IPRATROPIUM BROMIDE AND ALBUTEROL SULFATE 3 ML: .5; 3 SOLUTION RESPIRATORY (INHALATION) at 07:03

## 2019-01-01 RX ADMIN — IOPAMIDOL 100 ML: 755 INJECTION, SOLUTION INTRAVENOUS at 07:34

## 2019-01-01 RX ADMIN — GUAIFENESIN 600 MG: 600 TABLET, EXTENDED RELEASE ORAL at 08:27

## 2019-01-01 RX ADMIN — ALPRAZOLAM 0.5 MG: 0.5 TABLET ORAL at 22:29

## 2019-01-01 RX ADMIN — Medication 3 ML: at 20:35

## 2019-01-01 RX ADMIN — METOPROLOL TARTRATE 50 MG: 50 TABLET, FILM COATED ORAL at 09:21

## 2019-01-01 RX ADMIN — BUMETANIDE 1 MG: 0.25 INJECTION INTRAMUSCULAR; INTRAVENOUS at 10:39

## 2019-01-01 RX ADMIN — CALCIUM GLUCONATE 2 G: 98 INJECTION, SOLUTION INTRAVENOUS at 11:30

## 2019-01-01 RX ADMIN — ASPIRIN 81 MG 81 MG: 81 TABLET ORAL at 09:29

## 2019-01-01 RX ADMIN — METHYLPREDNISOLONE SODIUM SUCCINATE 125 MG: 125 INJECTION, POWDER, FOR SOLUTION INTRAMUSCULAR; INTRAVENOUS at 09:08

## 2019-01-01 RX ADMIN — GUAIFENESIN 400 MG: 100 SOLUTION ORAL at 08:47

## 2019-01-01 RX ADMIN — IPRATROPIUM BROMIDE AND ALBUTEROL SULFATE 3 ML: .5; 3 SOLUTION RESPIRATORY (INHALATION) at 15:54

## 2019-01-01 RX ADMIN — CALCIUM GLUCONATE 50 ML: 20 INJECTION, SOLUTION INTRAVENOUS at 21:42

## 2019-01-01 RX ADMIN — MELATONIN 500 UNITS: at 15:44

## 2019-01-01 RX ADMIN — METOPROLOL TARTRATE 50 MG: 50 TABLET, FILM COATED ORAL at 21:19

## 2019-01-01 RX ADMIN — BUMETANIDE 2 MG: 0.25 INJECTION INTRAMUSCULAR; INTRAVENOUS at 15:29

## 2019-01-01 RX ADMIN — CHLORHEXIDINE GLUCONATE 0.12% ORAL RINSE 15 ML: 1.2 LIQUID ORAL at 21:07

## 2019-01-01 RX ADMIN — CHLORHEXIDINE GLUCONATE 0.12% ORAL RINSE 15 ML: 1.2 LIQUID ORAL at 09:25

## 2019-01-01 RX ADMIN — ANTICOAGULANT CITRATE DEXTROSE SOLUTION FORMULA A 1000 ML: 12.25; 11; 3.65 SOLUTION INTRAVENOUS at 15:48

## 2019-01-01 RX ADMIN — Medication 3 ML: at 20:58

## 2019-01-01 RX ADMIN — PANTOPRAZOLE SODIUM 40 MG: 40 INJECTION, POWDER, FOR SOLUTION INTRAVENOUS at 08:38

## 2019-01-01 RX ADMIN — INSULIN LISPRO 12 UNITS: 100 INJECTION, SOLUTION INTRAVENOUS; SUBCUTANEOUS at 12:06

## 2019-01-01 RX ADMIN — METHYLPREDNISOLONE SODIUM SUCCINATE 80 MG: 125 INJECTION, POWDER, FOR SOLUTION INTRAMUSCULAR; INTRAVENOUS at 08:34

## 2019-01-01 RX ADMIN — POTASSIUM CHLORIDE 40 MEQ: 1.5 POWDER, FOR SOLUTION ORAL at 00:04

## 2019-01-01 RX ADMIN — MAGNESIUM SULFATE HEPTAHYDRATE 4 G: 40 INJECTION, SOLUTION INTRAVENOUS at 06:13

## 2019-01-01 RX ADMIN — ASPIRIN 81 MG 81 MG: 81 TABLET ORAL at 08:10

## 2019-01-01 RX ADMIN — GUAIFENESIN 400 MG: 100 SOLUTION ORAL at 15:21

## 2019-01-01 RX ADMIN — METHYLPREDNISOLONE SODIUM SUCCINATE 125 MG: 125 INJECTION, POWDER, FOR SOLUTION INTRAMUSCULAR; INTRAVENOUS at 03:41

## 2019-01-01 RX ADMIN — Medication 10 ML: at 21:13

## 2019-01-01 RX ADMIN — ITRACONAZOLE 200 MG: 100 CAPSULE ORAL at 18:42

## 2019-01-01 RX ADMIN — IPRATROPIUM BROMIDE AND ALBUTEROL SULFATE 3 ML: .5; 3 SOLUTION RESPIRATORY (INHALATION) at 20:12

## 2019-01-01 RX ADMIN — EPOETIN ALFA-EPBX 10000 UNITS: 10000 INJECTION, SOLUTION INTRAVENOUS; SUBCUTANEOUS at 20:35

## 2019-01-01 RX ADMIN — SODIUM CHLORIDE, SODIUM LACTATE, POTASSIUM CHLORIDE, AND CALCIUM CHLORIDE: .6; .31; .03; .02 INJECTION, SOLUTION INTRAVENOUS at 09:01

## 2019-01-01 RX ADMIN — INSULIN LISPRO 4 UNITS: 100 INJECTION, SOLUTION INTRAVENOUS; SUBCUTANEOUS at 08:30

## 2019-01-01 RX ADMIN — ACETAMINOPHEN 650 MG: 650 SUPPOSITORY RECTAL at 17:09

## 2019-01-01 RX ADMIN — GUAIFENESIN 400 MG: 100 SOLUTION ORAL at 07:44

## 2019-01-01 RX ADMIN — INSULIN LISPRO 8 UNITS: 100 INJECTION, SOLUTION INTRAVENOUS; SUBCUTANEOUS at 17:19

## 2019-01-01 RX ADMIN — GUAIFENESIN 400 MG: 100 SOLUTION ORAL at 23:12

## 2019-01-01 RX ADMIN — Medication 10 ML: at 11:08

## 2019-01-01 RX ADMIN — Medication: at 05:22

## 2019-01-01 RX ADMIN — PROPOFOL 50 MCG/KG/MIN: 10 INJECTION, EMULSION INTRAVENOUS at 14:18

## 2019-01-01 RX ADMIN — CALCIUM GLUCONATE 4 G: 98 INJECTION, SOLUTION INTRAVENOUS at 21:35

## 2019-01-01 RX ADMIN — ITRACONAZOLE 200 MG: 100 CAPSULE ORAL at 20:33

## 2019-01-01 RX ADMIN — ASPIRIN 81 MG 81 MG: 81 TABLET ORAL at 09:09

## 2019-01-01 RX ADMIN — RACEPINEPHRINE HYDROCHLORIDE 0.5 ML: 11.25 SOLUTION RESPIRATORY (INHALATION) at 18:40

## 2019-01-01 RX ADMIN — SULFAMETHOXAZOLE AND TRIMETHOPRIM 1 TABLET: 400; 80 TABLET ORAL at 09:21

## 2019-01-01 RX ADMIN — CHLORHEXIDINE GLUCONATE 0.12% ORAL RINSE 15 ML: 1.2 LIQUID ORAL at 08:25

## 2019-01-01 RX ADMIN — ALPRAZOLAM 0.5 MG: 0.5 TABLET ORAL at 09:21

## 2019-01-01 RX ADMIN — GUAIFENESIN 400 MG: 100 SOLUTION ORAL at 00:41

## 2019-01-01 RX ADMIN — IRON SUCROSE 100 MG: 20 INJECTION, SOLUTION INTRAVENOUS at 03:41

## 2019-01-01 RX ADMIN — GUAIFENESIN 600 MG: 600 TABLET, EXTENDED RELEASE ORAL at 11:10

## 2019-01-01 RX ADMIN — RACEPINEPHRINE HYDROCHLORIDE 0.5 ML: 11.25 SOLUTION RESPIRATORY (INHALATION) at 19:00

## 2019-01-01 RX ADMIN — ITRACONAZOLE 200 MG: 100 CAPSULE ORAL at 09:20

## 2019-01-01 RX ADMIN — GUAIFENESIN 400 MG: 100 SOLUTION ORAL at 12:05

## 2019-01-01 RX ADMIN — Medication 3 ML: at 08:09

## 2019-01-01 RX ADMIN — ALBUMIN HUMAN 25 G: 0.25 SOLUTION INTRAVENOUS at 02:11

## 2019-01-01 RX ADMIN — Medication 10 ML: at 20:14

## 2019-01-01 RX ADMIN — GUAIFENESIN 400 MG: 100 SOLUTION ORAL at 09:08

## 2019-01-01 RX ADMIN — INSULIN LISPRO 4 UNITS: 100 INJECTION, SOLUTION INTRAVENOUS; SUBCUTANEOUS at 20:54

## 2019-01-01 RX ADMIN — IPRATROPIUM BROMIDE AND ALBUTEROL SULFATE 3 ML: .5; 3 SOLUTION RESPIRATORY (INHALATION) at 04:54

## 2019-01-01 RX ADMIN — PROPOFOL 230 MG: 10 INJECTION, EMULSION INTRAVENOUS at 09:20

## 2019-01-01 RX ADMIN — CYCLOPHOSPHAMIDE 50 MG: 50 CAPSULE ORAL at 07:57

## 2019-01-01 RX ADMIN — IPRATROPIUM BROMIDE AND ALBUTEROL SULFATE 3 ML: .5; 3 SOLUTION RESPIRATORY (INHALATION) at 06:45

## 2019-01-01 RX ADMIN — CALCIUM GLUCONATE 50 ML: 20 INJECTION, SOLUTION INTRAVENOUS at 09:58

## 2019-01-01 RX ADMIN — ALPRAZOLAM 1 MG: 1 TABLET ORAL at 21:19

## 2019-01-01 RX ADMIN — HYDRALAZINE HYDROCHLORIDE 75 MG: 25 TABLET, FILM COATED ORAL at 17:36

## 2019-01-01 RX ADMIN — METOPROLOL TARTRATE 5 MG: 5 INJECTION INTRAVENOUS at 12:21

## 2019-01-01 RX ADMIN — RACEPINEPHRINE HYDROCHLORIDE 0.5 ML: 11.25 SOLUTION RESPIRATORY (INHALATION) at 14:47

## 2019-01-01 RX ADMIN — PREDNISONE 40 MG: 20 TABLET ORAL at 12:08

## 2019-01-01 RX ADMIN — GUAIFENESIN 400 MG: 100 SOLUTION ORAL at 16:59

## 2019-01-01 RX ADMIN — IPRATROPIUM BROMIDE AND ALBUTEROL SULFATE 3 ML: .5; 3 SOLUTION RESPIRATORY (INHALATION) at 08:00

## 2019-01-01 RX ADMIN — METOPROLOL TARTRATE 50 MG: 50 TABLET, FILM COATED ORAL at 20:57

## 2019-01-01 RX ADMIN — SODIUM CHLORIDE 1779 ML: 0.9 INJECTION, SOLUTION INTRAVENOUS at 06:17

## 2019-01-01 RX ADMIN — IPRATROPIUM BROMIDE AND ALBUTEROL SULFATE 3 ML: .5; 3 SOLUTION RESPIRATORY (INHALATION) at 15:00

## 2019-01-01 RX ADMIN — ATENOLOL 25 MG: 25 TABLET ORAL at 20:12

## 2019-01-01 RX ADMIN — METHYLPREDNISOLONE SODIUM SUCCINATE 40 MG: 40 INJECTION, POWDER, FOR SOLUTION INTRAMUSCULAR; INTRAVENOUS at 22:38

## 2019-01-01 RX ADMIN — IOPAMIDOL 100 ML: 755 INJECTION, SOLUTION INTRAVENOUS at 17:18

## 2019-01-01 RX ADMIN — ALPRAZOLAM 1 MG: 1 TABLET ORAL at 09:08

## 2019-01-01 RX ADMIN — IPRATROPIUM BROMIDE AND ALBUTEROL SULFATE 3 ML: .5; 3 SOLUTION RESPIRATORY (INHALATION) at 06:35

## 2019-01-01 RX ADMIN — CALCIUM GLUCONATE 2 G: 98 INJECTION, SOLUTION INTRAVENOUS at 12:05

## 2019-01-01 RX ADMIN — Medication 10 ML: at 20:38

## 2019-01-01 RX ADMIN — ALPRAZOLAM 0.25 MG: 0.25 TABLET ORAL at 20:59

## 2019-01-01 RX ADMIN — METHYLPREDNISOLONE SODIUM SUCCINATE 125 MG: 125 INJECTION, POWDER, FOR SOLUTION INTRAMUSCULAR; INTRAVENOUS at 20:54

## 2019-01-01 RX ADMIN — PANTOPRAZOLE SODIUM 40 MG: 40 INJECTION, POWDER, FOR SOLUTION INTRAVENOUS at 10:10

## 2019-01-01 RX ADMIN — HEPARIN SODIUM 4000 UNITS: 1000 INJECTION INTRAVENOUS; SUBCUTANEOUS at 00:30

## 2019-01-01 RX ADMIN — Medication 10 ML: at 20:54

## 2019-01-01 RX ADMIN — GUAIFENESIN 1200 MG: 600 TABLET, EXTENDED RELEASE ORAL at 21:10

## 2019-01-01 RX ADMIN — ALPRAZOLAM 1 MG: 1 TABLET ORAL at 20:30

## 2019-01-01 RX ADMIN — GLYCOPYRROLATE 60 MG: 0.2 INJECTION INTRAMUSCULAR; INTRAVENOUS at 09:14

## 2019-01-01 RX ADMIN — METHYLPREDNISOLONE SODIUM SUCCINATE 125 MG: 125 INJECTION, POWDER, FOR SOLUTION INTRAMUSCULAR; INTRAVENOUS at 10:44

## 2019-01-01 RX ADMIN — INSULIN LISPRO 8 UNITS: 100 INJECTION, SOLUTION INTRAVENOUS; SUBCUTANEOUS at 23:13

## 2019-01-01 RX ADMIN — ONDANSETRON 4 MG: 2 INJECTION INTRAMUSCULAR; INTRAVENOUS at 15:21

## 2019-01-01 RX ADMIN — POTASSIUM CHLORIDE 40 MEQ: 1.5 POWDER, FOR SOLUTION ORAL at 05:30

## 2019-01-01 RX ADMIN — GUAIFENESIN 400 MG: 100 SOLUTION ORAL at 23:51

## 2019-01-01 RX ADMIN — PANTOPRAZOLE SODIUM 40 MG: 40 TABLET, DELAYED RELEASE ORAL at 11:09

## 2019-01-01 RX ADMIN — ASPIRIN 81 MG 81 MG: 81 TABLET ORAL at 08:27

## 2019-01-01 RX ADMIN — AMLODIPINE BESYLATE 10 MG: 5 TABLET ORAL at 08:38

## 2019-01-01 RX ADMIN — RACEPINEPHRINE HYDROCHLORIDE 0.5 ML: 11.25 SOLUTION RESPIRATORY (INHALATION) at 19:06

## 2019-01-01 RX ADMIN — METHYLPREDNISOLONE SODIUM SUCCINATE 125 MG: 125 INJECTION, POWDER, FOR SOLUTION INTRAMUSCULAR; INTRAVENOUS at 08:49

## 2019-01-01 RX ADMIN — INSULIN LISPRO 4 UNITS: 100 INJECTION, SOLUTION INTRAVENOUS; SUBCUTANEOUS at 22:56

## 2019-01-01 RX ADMIN — GUAIFENESIN 400 MG: 100 SOLUTION ORAL at 21:19

## 2019-01-01 RX ADMIN — RACEPINEPHRINE HYDROCHLORIDE 0.5 ML: 11.25 SOLUTION RESPIRATORY (INHALATION) at 14:09

## 2019-01-01 RX ADMIN — METHYLPREDNISOLONE SODIUM SUCCINATE 125 MG: 125 INJECTION, POWDER, FOR SOLUTION INTRAMUSCULAR; INTRAVENOUS at 05:49

## 2019-01-01 RX ADMIN — IPRATROPIUM BROMIDE AND ALBUTEROL SULFATE 3 ML: .5; 3 SOLUTION RESPIRATORY (INHALATION) at 07:40

## 2019-01-01 RX ADMIN — MELATONIN 500 UNITS: at 11:10

## 2019-01-01 RX ADMIN — PANTOPRAZOLE SODIUM 40 MG: 40 INJECTION, POWDER, FOR SOLUTION INTRAVENOUS at 09:09

## 2019-01-01 RX ADMIN — GUAIFENESIN 400 MG: 100 SOLUTION ORAL at 11:34

## 2019-01-01 RX ADMIN — IPRATROPIUM BROMIDE AND ALBUTEROL SULFATE 3 ML: .5; 3 SOLUTION RESPIRATORY (INHALATION) at 18:57

## 2019-01-01 RX ADMIN — Medication 10 ML: at 20:58

## 2019-01-01 RX ADMIN — MAGNESIUM SULFATE HEPTAHYDRATE 1 G: 1 INJECTION, SOLUTION INTRAVENOUS at 08:34

## 2019-01-01 RX ADMIN — HYDROCODONE BITARTRATE AND HOMATROPINE METHYLBROMIDE 5 ML: 5; 1.5 SOLUTION ORAL at 07:12

## 2019-01-01 RX ADMIN — ATENOLOL 25 MG: 25 TABLET ORAL at 09:01

## 2019-01-01 RX ADMIN — Medication 3 ML: at 08:31

## 2019-01-01 RX ADMIN — GUAIFENESIN 400 MG: 100 SOLUTION ORAL at 16:39

## 2019-01-01 RX ADMIN — INSULIN LISPRO 8 UNITS: 100 INJECTION, SOLUTION INTRAVENOUS; SUBCUTANEOUS at 21:00

## 2019-01-01 RX ADMIN — ALBUMIN HUMAN 12.5 G: 0.25 SOLUTION INTRAVENOUS at 02:57

## 2019-01-01 RX ADMIN — IPRATROPIUM BROMIDE AND ALBUTEROL SULFATE 3 ML: .5; 3 SOLUTION RESPIRATORY (INHALATION) at 18:22

## 2019-01-01 RX ADMIN — INSULIN LISPRO 4 UNITS: 100 INJECTION, SOLUTION INTRAVENOUS; SUBCUTANEOUS at 20:57

## 2019-01-01 RX ADMIN — PANTOPRAZOLE SODIUM 40 MG: 40 INJECTION, POWDER, FOR SOLUTION INTRAVENOUS at 05:18

## 2019-01-01 RX ADMIN — AMLODIPINE BESYLATE 10 MG: 5 TABLET ORAL at 09:09

## 2019-01-01 RX ADMIN — MORPHINE SULFATE 2 MG: 4 INJECTION INTRAVENOUS at 03:11

## 2019-01-01 RX ADMIN — IPRATROPIUM BROMIDE AND ALBUTEROL SULFATE 3 ML: .5; 3 SOLUTION RESPIRATORY (INHALATION) at 06:10

## 2019-01-01 RX ADMIN — Medication 10 ML: at 09:30

## 2019-01-01 RX ADMIN — INSULIN LISPRO 4 UNITS: 100 INJECTION, SOLUTION INTRAVENOUS; SUBCUTANEOUS at 07:54

## 2019-01-01 RX ADMIN — METHYLPREDNISOLONE SODIUM SUCCINATE 125 MG: 125 INJECTION, POWDER, FOR SOLUTION INTRAMUSCULAR; INTRAVENOUS at 21:01

## 2019-01-01 RX ADMIN — IPRATROPIUM BROMIDE AND ALBUTEROL SULFATE 3 ML: .5; 3 SOLUTION RESPIRATORY (INHALATION) at 06:40

## 2019-01-01 RX ADMIN — IPRATROPIUM BROMIDE AND ALBUTEROL SULFATE 3 ML: .5; 3 SOLUTION RESPIRATORY (INHALATION) at 11:20

## 2019-01-01 RX ADMIN — RISPERIDONE 0.5 MG: 0.25 TABLET ORAL at 21:43

## 2019-01-01 RX ADMIN — RISPERIDONE 0.5 MG: 0.25 TABLET ORAL at 15:00

## 2019-01-01 RX ADMIN — RACEPINEPHRINE HYDROCHLORIDE 0.5 ML: 11.25 SOLUTION RESPIRATORY (INHALATION) at 21:10

## 2019-01-01 RX ADMIN — IPRATROPIUM BROMIDE AND ALBUTEROL SULFATE 3 ML: .5; 3 SOLUTION RESPIRATORY (INHALATION) at 10:52

## 2019-01-01 RX ADMIN — CALCIUM GLUCONATE 50 ML: 20 INJECTION, SOLUTION INTRAVENOUS at 22:42

## 2019-01-01 RX ADMIN — METOPROLOL TARTRATE 50 MG: 50 TABLET, FILM COATED ORAL at 08:38

## 2019-01-01 RX ADMIN — IRON SUCROSE 100 MG: 20 INJECTION, SOLUTION INTRAVENOUS at 12:18

## 2019-01-01 RX ADMIN — IPRATROPIUM BROMIDE AND ALBUTEROL SULFATE 3 ML: .5; 3 SOLUTION RESPIRATORY (INHALATION) at 12:26

## 2019-01-01 RX ADMIN — CHLORHEXIDINE GLUCONATE 0.12% ORAL RINSE 15 ML: 1.2 LIQUID ORAL at 22:15

## 2019-01-01 RX ADMIN — IPRATROPIUM BROMIDE AND ALBUTEROL SULFATE 3 ML: .5; 3 SOLUTION RESPIRATORY (INHALATION) at 18:44

## 2019-01-01 RX ADMIN — POTASSIUM CHLORIDE 40 MEQ: 1.5 POWDER, FOR SOLUTION ORAL at 08:50

## 2019-01-01 RX ADMIN — POTASSIUM CHLORIDE 40 MEQ: 1500 TABLET, EXTENDED RELEASE ORAL at 07:57

## 2019-01-01 RX ADMIN — GUAIFENESIN 400 MG: 100 SOLUTION ORAL at 03:51

## 2019-01-01 RX ADMIN — METHYLPREDNISOLONE SODIUM SUCCINATE 80 MG: 125 INJECTION, POWDER, FOR SOLUTION INTRAMUSCULAR; INTRAVENOUS at 16:39

## 2019-01-01 RX ADMIN — GUAIFENESIN 400 MG: 100 SOLUTION ORAL at 12:20

## 2019-01-01 RX ADMIN — Medication 3 ML: at 21:31

## 2019-01-01 RX ADMIN — Medication 3 ML: at 20:54

## 2019-01-01 RX ADMIN — GUAIFENESIN 400 MG: 100 SOLUTION ORAL at 23:35

## 2019-01-01 RX ADMIN — ALBUMIN HUMAN 25 G: 0.25 SOLUTION INTRAVENOUS at 17:14

## 2019-01-01 RX ADMIN — ALPRAZOLAM 1 MG: 1 TABLET ORAL at 08:50

## 2019-01-01 RX ADMIN — METOPROLOL TARTRATE 50 MG: 50 TABLET, FILM COATED ORAL at 20:38

## 2019-01-01 RX ADMIN — GUAIFENESIN 400 MG: 100 SOLUTION ORAL at 03:17

## 2019-01-01 RX ADMIN — ATENOLOL 25 MG: 25 TABLET ORAL at 08:10

## 2019-01-01 RX ADMIN — IPRATROPIUM BROMIDE AND ALBUTEROL SULFATE 3 ML: .5; 3 SOLUTION RESPIRATORY (INHALATION) at 19:08

## 2019-01-01 RX ADMIN — IPRATROPIUM BROMIDE AND ALBUTEROL SULFATE 3 ML: .5; 3 SOLUTION RESPIRATORY (INHALATION) at 10:38

## 2019-01-01 RX ADMIN — MAGNESIUM SULFATE HEPTAHYDRATE 1 G: 1 INJECTION, SOLUTION INTRAVENOUS at 18:08

## 2019-01-01 RX ADMIN — GUAIFENESIN 400 MG: 100 SOLUTION ORAL at 22:40

## 2019-01-01 RX ADMIN — METHYLPREDNISOLONE SODIUM SUCCINATE 125 MG: 125 INJECTION, POWDER, FOR SOLUTION INTRAMUSCULAR; INTRAVENOUS at 08:37

## 2019-01-01 RX ADMIN — Medication 3 ML: at 08:27

## 2019-01-01 RX ADMIN — AMLODIPINE BESYLATE 5 MG: 5 TABLET ORAL at 22:27

## 2019-01-01 RX ADMIN — CALCIUM GLUCONATE 2 G: 98 INJECTION, SOLUTION INTRAVENOUS at 20:54

## 2019-01-01 RX ADMIN — Medication 10 ML: at 20:32

## 2019-01-01 RX ADMIN — Medication 10 ML: at 21:08

## 2019-01-01 RX ADMIN — ALPRAZOLAM 1 MG: 1 TABLET ORAL at 20:13

## 2019-01-01 RX ADMIN — Medication 3 ML: at 20:14

## 2019-01-01 RX ADMIN — GUAIFENESIN 400 MG: 100 SOLUTION ORAL at 08:24

## 2019-01-01 RX ADMIN — ALPRAZOLAM 0.5 MG: 0.5 TABLET ORAL at 21:05

## 2019-01-01 RX ADMIN — MORPHINE SULFATE 2 MG: 4 INJECTION INTRAVENOUS at 05:28

## 2019-01-01 RX ADMIN — METOPROLOL TARTRATE 5 MG: 5 INJECTION INTRAVENOUS at 00:59

## 2019-01-01 RX ADMIN — ONDANSETRON 4 MG: 2 INJECTION INTRAMUSCULAR; INTRAVENOUS at 06:23

## 2019-01-01 RX ADMIN — PROPOFOL 30 MCG/KG/MIN: 10 INJECTION, EMULSION INTRAVENOUS at 18:41

## 2019-01-01 RX ADMIN — GUAIFENESIN 400 MG: 100 SOLUTION ORAL at 12:36

## 2019-01-01 RX ADMIN — BUMETANIDE 2 MG: 0.25 INJECTION INTRAMUSCULAR; INTRAVENOUS at 03:17

## 2019-01-01 RX ADMIN — Medication 10 ML: at 21:19

## 2019-01-01 RX ADMIN — FENTANYL CITRATE 50 MCG: 50 INJECTION, SOLUTION INTRAMUSCULAR; INTRAVENOUS at 10:07

## 2019-01-01 RX ADMIN — GUAIFENESIN 400 MG: 100 SOLUTION ORAL at 17:36

## 2019-01-01 RX ADMIN — GUAIFENESIN 400 MG: 100 SOLUTION ORAL at 20:13

## 2019-01-01 RX ADMIN — GUAIFENESIN 400 MG: 100 SOLUTION ORAL at 16:35

## 2019-01-01 RX ADMIN — PROPOFOL 45.41 MCG/KG/MIN: 10 INJECTION, EMULSION INTRAVENOUS at 19:17

## 2019-01-01 RX ADMIN — MORPHINE SULFATE 2 MG: 4 INJECTION INTRAVENOUS at 22:29

## 2019-01-01 RX ADMIN — PANTOPRAZOLE SODIUM 40 MG: 40 INJECTION, POWDER, FOR SOLUTION INTRAVENOUS at 08:19

## 2019-01-01 RX ADMIN — AMLODIPINE BESYLATE 10 MG: 5 TABLET ORAL at 10:04

## 2019-01-01 RX ADMIN — HYDRALAZINE HYDROCHLORIDE 75 MG: 25 TABLET, FILM COATED ORAL at 15:17

## 2019-01-01 RX ADMIN — MORPHINE SULFATE 2 MG: 4 INJECTION INTRAVENOUS at 20:30

## 2019-01-01 RX ADMIN — CALCIUM GLUCONATE 2 G: 98 INJECTION, SOLUTION INTRAVENOUS at 00:41

## 2019-01-01 RX ADMIN — RACEPINEPHRINE HYDROCHLORIDE 0.5 ML: 11.25 SOLUTION RESPIRATORY (INHALATION) at 20:10

## 2019-01-01 RX ADMIN — METOPROLOL TARTRATE 50 MG: 50 TABLET, FILM COATED ORAL at 07:58

## 2019-01-01 RX ADMIN — ALPRAZOLAM 0.25 MG: 0.25 TABLET ORAL at 09:28

## 2019-01-01 RX ADMIN — IPRATROPIUM BROMIDE AND ALBUTEROL SULFATE 3 ML: .5; 3 SOLUTION RESPIRATORY (INHALATION) at 15:50

## 2019-01-01 RX ADMIN — METHYLPREDNISOLONE SODIUM SUCCINATE 125 MG: 125 INJECTION, POWDER, FOR SOLUTION INTRAMUSCULAR; INTRAVENOUS at 09:43

## 2019-01-01 RX ADMIN — VANCOMYCIN HYDROCHLORIDE 1250 MG: 10 INJECTION, POWDER, LYOPHILIZED, FOR SOLUTION INTRAVENOUS at 06:56

## 2019-01-01 RX ADMIN — CEFEPIME HYDROCHLORIDE 2 G: 2 INJECTION, POWDER, FOR SOLUTION INTRAVENOUS at 05:03

## 2019-01-01 RX ADMIN — Medication 3 ML: at 10:16

## 2019-01-01 RX ADMIN — GUAIFENESIN 400 MG: 100 SOLUTION ORAL at 04:00

## 2019-01-01 RX ADMIN — CHLORHEXIDINE GLUCONATE 0.12% ORAL RINSE 15 ML: 1.2 LIQUID ORAL at 08:24

## 2019-01-01 RX ADMIN — IPRATROPIUM BROMIDE AND ALBUTEROL SULFATE 3 ML: .5; 3 SOLUTION RESPIRATORY (INHALATION) at 14:44

## 2019-01-01 RX ADMIN — PROPOFOL 50 MCG/KG/MIN: 10 INJECTION, EMULSION INTRAVENOUS at 09:40

## 2019-01-01 RX ADMIN — ALPRAZOLAM 0.5 MG: 0.5 TABLET ORAL at 20:38

## 2019-01-01 RX ADMIN — GUAIFENESIN 400 MG: 100 SOLUTION ORAL at 00:07

## 2019-01-01 RX ADMIN — PANTOPRAZOLE SODIUM 40 MG: 40 INJECTION, POWDER, FOR SOLUTION INTRAVENOUS at 08:24

## 2019-01-01 RX ADMIN — METHYLPREDNISOLONE SODIUM SUCCINATE 125 MG: 125 INJECTION, POWDER, FOR SOLUTION INTRAMUSCULAR; INTRAVENOUS at 03:16

## 2019-01-01 RX ADMIN — Medication 3 ML: at 20:55

## 2019-01-01 RX ADMIN — PROPOFOL 25 MCG/KG/MIN: 10 INJECTION, EMULSION INTRAVENOUS at 14:19

## 2019-01-01 RX ADMIN — RACEPINEPHRINE HYDROCHLORIDE 0.5 ML: 11.25 SOLUTION RESPIRATORY (INHALATION) at 18:18

## 2019-01-01 RX ADMIN — METHYLPREDNISOLONE SODIUM SUCCINATE 125 MG: 125 INJECTION, POWDER, FOR SOLUTION INTRAMUSCULAR; INTRAVENOUS at 14:54

## 2019-01-01 RX ADMIN — BUMETANIDE 2 MG: 0.25 INJECTION INTRAMUSCULAR; INTRAVENOUS at 15:44

## 2019-01-01 RX ADMIN — METHYLPREDNISOLONE SODIUM SUCCINATE 125 MG: 125 INJECTION, POWDER, FOR SOLUTION INTRAMUSCULAR; INTRAVENOUS at 06:18

## 2019-01-01 RX ADMIN — INSULIN LISPRO 4 UNITS: 100 INJECTION, SOLUTION INTRAVENOUS; SUBCUTANEOUS at 00:04

## 2019-01-01 RX ADMIN — ALBUTEROL SULFATE 2.5 MG: 2.5 SOLUTION RESPIRATORY (INHALATION) at 21:17

## 2019-01-01 RX ADMIN — MORPHINE SULFATE 2 MG: 4 INJECTION, SOLUTION INTRAMUSCULAR; INTRAVENOUS at 05:28

## 2019-01-01 RX ADMIN — CYCLOPHOSPHAMIDE 50 MG: 50 CAPSULE ORAL at 10:11

## 2019-01-01 RX ADMIN — RISPERIDONE 0.5 MG: 0.25 TABLET ORAL at 16:42

## 2019-01-01 RX ADMIN — BUMETANIDE 2 MG: 0.25 INJECTION INTRAMUSCULAR; INTRAVENOUS at 05:50

## 2019-01-01 RX ADMIN — SULFAMETHOXAZOLE AND TRIMETHOPRIM 1 TABLET: 400; 80 TABLET ORAL at 10:34

## 2019-01-01 RX ADMIN — SULFAMETHOXAZOLE AND TRIMETHOPRIM 1 TABLET: 400; 80 TABLET ORAL at 08:29

## 2019-01-01 RX ADMIN — PANTOPRAZOLE SODIUM 40 MG: 40 INJECTION, POWDER, FOR SOLUTION INTRAVENOUS at 09:21

## 2019-01-01 RX ADMIN — MORPHINE SULFATE 2 MG: 4 INJECTION INTRAVENOUS at 06:24

## 2019-01-01 RX ADMIN — ALPRAZOLAM 0.25 MG: 0.25 TABLET ORAL at 04:28

## 2019-01-01 RX ADMIN — METHYLPREDNISOLONE SODIUM SUCCINATE 125 MG: 125 INJECTION, POWDER, FOR SOLUTION INTRAMUSCULAR; INTRAVENOUS at 20:30

## 2019-01-01 RX ADMIN — ITRACONAZOLE 200 MG: 100 CAPSULE ORAL at 18:36

## 2019-01-01 RX ADMIN — GUAIFENESIN 400 MG: 100 SOLUTION ORAL at 03:35

## 2019-01-01 RX ADMIN — Medication 3 ML: at 21:00

## 2019-01-01 RX ADMIN — CALCIUM GLUCONATE 50 ML: 20 INJECTION, SOLUTION INTRAVENOUS at 08:33

## 2019-01-01 RX ADMIN — ALPRAZOLAM 0.5 MG: 0.5 TABLET ORAL at 08:04

## 2019-01-01 RX ADMIN — RACEPINEPHRINE HYDROCHLORIDE 0.5 ML: 11.25 SOLUTION RESPIRATORY (INHALATION) at 06:45

## 2019-01-01 RX ADMIN — GUAIFENESIN 400 MG: 100 SOLUTION ORAL at 08:01

## 2019-01-01 RX ADMIN — IPRATROPIUM BROMIDE AND ALBUTEROL SULFATE 3 ML: .5; 3 SOLUTION RESPIRATORY (INHALATION) at 14:35

## 2019-01-01 RX ADMIN — RACEPINEPHRINE HYDROCHLORIDE 0.5 ML: 11.25 SOLUTION RESPIRATORY (INHALATION) at 15:28

## 2019-01-01 RX ADMIN — Medication 10 ML: at 20:34

## 2019-01-01 RX ADMIN — GUAIFENESIN 400 MG: 100 SOLUTION ORAL at 13:37

## 2019-01-01 RX ADMIN — ACETAMINOPHEN 1000 MG: 500 TABLET, FILM COATED ORAL at 18:08

## 2019-01-01 RX ADMIN — Medication 400 UNITS: at 08:36

## 2019-01-01 RX ADMIN — ASPIRIN 81 MG 81 MG: 81 TABLET ORAL at 10:10

## 2019-01-01 RX ADMIN — MAGNESIUM SULFATE HEPTAHYDRATE 2 G: 40 INJECTION, SOLUTION INTRAVENOUS at 14:20

## 2019-01-01 RX ADMIN — METHYLPREDNISOLONE SODIUM SUCCINATE 125 MG: 125 INJECTION, POWDER, FOR SOLUTION INTRAMUSCULAR; INTRAVENOUS at 09:09

## 2019-01-01 RX ADMIN — CYCLOPHOSPHAMIDE 50 MG: 50 CAPSULE ORAL at 11:09

## 2019-01-01 RX ADMIN — RISPERIDONE 0.5 MG: 0.25 TABLET ORAL at 13:42

## 2019-01-01 RX ADMIN — MAGNESIUM SULFATE HEPTAHYDRATE 1 G: 1 INJECTION, SOLUTION INTRAVENOUS at 12:05

## 2019-01-01 RX ADMIN — METHYLPREDNISOLONE SODIUM SUCCINATE 125 MG: 125 INJECTION, POWDER, FOR SOLUTION INTRAMUSCULAR; INTRAVENOUS at 21:10

## 2019-01-01 RX ADMIN — INSULIN LISPRO 12 UNITS: 100 INJECTION, SOLUTION INTRAVENOUS; SUBCUTANEOUS at 11:42

## 2019-01-01 RX ADMIN — GUAIFENESIN 400 MG: 100 SOLUTION ORAL at 15:15

## 2019-01-01 RX ADMIN — ATENOLOL 25 MG: 25 TABLET ORAL at 21:10

## 2019-01-01 RX ADMIN — ALBUMIN HUMAN 25 G: 0.25 SOLUTION INTRAVENOUS at 12:04

## 2019-01-01 RX ADMIN — SULFAMETHOXAZOLE AND TRIMETHOPRIM 1 TABLET: 400; 80 TABLET ORAL at 08:49

## 2019-01-01 RX ADMIN — IPRATROPIUM BROMIDE AND ALBUTEROL SULFATE 3 ML: .5; 3 SOLUTION RESPIRATORY (INHALATION) at 14:46

## 2019-01-01 RX ADMIN — RACEPINEPHRINE HYDROCHLORIDE 0.5 ML: 11.25 SOLUTION RESPIRATORY (INHALATION) at 20:48

## 2019-01-01 RX ADMIN — MELATONIN 500 UNITS: at 09:08

## 2019-01-01 RX ADMIN — IPRATROPIUM BROMIDE AND ALBUTEROL SULFATE 3 ML: .5; 3 SOLUTION RESPIRATORY (INHALATION) at 04:45

## 2019-01-01 RX ADMIN — CALCIUM GLUCONATE 50 ML: 20 INJECTION, SOLUTION INTRAVENOUS at 20:58

## 2019-01-01 RX ADMIN — Medication 10 ML: at 08:04

## 2019-01-01 RX ADMIN — RACEPINEPHRINE HYDROCHLORIDE 0.5 ML: 11.25 SOLUTION RESPIRATORY (INHALATION) at 07:52

## 2019-01-01 RX ADMIN — ALBUTEROL SULFATE 2.5 MG: 2.5 SOLUTION RESPIRATORY (INHALATION) at 10:48

## 2019-01-01 RX ADMIN — MELATONIN 500 UNITS: at 08:39

## 2019-01-01 RX ADMIN — GUAIFENESIN 400 MG: 100 SOLUTION ORAL at 12:06

## 2019-01-01 RX ADMIN — ALPRAZOLAM 1 MG: 1 TABLET ORAL at 10:06

## 2019-01-01 RX ADMIN — METHYLPREDNISOLONE SODIUM SUCCINATE 125 MG: 500 INJECTION, POWDER, FOR SOLUTION INTRAMUSCULAR; INTRAVENOUS at 09:13

## 2019-01-01 RX ADMIN — GUAIFENESIN 400 MG: 100 SOLUTION ORAL at 06:07

## 2019-01-01 RX ADMIN — CEFEPIME HYDROCHLORIDE 2 G: 2 INJECTION, POWDER, FOR SOLUTION INTRAVENOUS at 13:37

## 2019-01-01 RX ADMIN — GUAIFENESIN 400 MG: 100 SOLUTION ORAL at 03:27

## 2019-01-01 RX ADMIN — GUAIFENESIN 400 MG: 100 SOLUTION ORAL at 16:37

## 2019-01-01 RX ADMIN — Medication 3 ML: at 08:35

## 2019-01-01 RX ADMIN — Medication 10 ML: at 07:55

## 2019-01-01 RX ADMIN — DIPHENHYDRAMINE HCL 25 MG: 25 TABLET ORAL at 08:51

## 2019-01-01 RX ADMIN — GUAIFENESIN 400 MG: 100 SOLUTION ORAL at 23:29

## 2019-01-01 RX ADMIN — CHLORHEXIDINE GLUCONATE 0.12% ORAL RINSE 15 ML: 1.2 LIQUID ORAL at 09:10

## 2019-01-01 RX ADMIN — RACEPINEPHRINE HYDROCHLORIDE 0.5 ML: 11.25 SOLUTION RESPIRATORY (INHALATION) at 14:27

## 2019-01-01 RX ADMIN — INSULIN LISPRO 4 UNITS: 100 INJECTION, SOLUTION INTRAVENOUS; SUBCUTANEOUS at 18:03

## 2019-01-01 RX ADMIN — BUMETANIDE 1 MG: 0.25 INJECTION INTRAMUSCULAR; INTRAVENOUS at 21:00

## 2019-01-01 RX ADMIN — METHYLPREDNISOLONE SODIUM SUCCINATE 125 MG: 125 INJECTION, POWDER, FOR SOLUTION INTRAMUSCULAR; INTRAVENOUS at 04:59

## 2019-01-01 RX ADMIN — METOPROLOL TARTRATE 50 MG: 50 TABLET, FILM COATED ORAL at 22:41

## 2019-01-01 RX ADMIN — Medication 3 ML: at 21:10

## 2019-01-01 RX ADMIN — IPRATROPIUM BROMIDE AND ALBUTEROL SULFATE 3 ML: .5; 3 SOLUTION RESPIRATORY (INHALATION) at 07:52

## 2019-01-01 RX ADMIN — MELATONIN 500 UNITS: at 08:50

## 2019-01-01 RX ADMIN — CALCIUM GLUCONATE 4 G: 98 INJECTION, SOLUTION INTRAVENOUS at 15:47

## 2019-01-01 RX ADMIN — Medication 10 ML: at 08:24

## 2019-01-01 RX ADMIN — Medication 10 ML: at 08:23

## 2019-01-01 RX ADMIN — Medication 3 ML: at 21:13

## 2019-01-01 RX ADMIN — ALBUTEROL SULFATE 2.5 MG: 2.5 SOLUTION RESPIRATORY (INHALATION) at 06:10

## 2019-01-01 RX ADMIN — IPRATROPIUM BROMIDE AND ALBUTEROL SULFATE 3 ML: .5; 3 SOLUTION RESPIRATORY (INHALATION) at 16:03

## 2019-01-01 RX ADMIN — INSULIN LISPRO 12 UNITS: 100 INJECTION, SOLUTION INTRAVENOUS; SUBCUTANEOUS at 11:34

## 2019-01-01 RX ADMIN — VANCOMYCIN HYDROCHLORIDE 1250 MG: 10 INJECTION, POWDER, LYOPHILIZED, FOR SOLUTION INTRAVENOUS at 07:30

## 2019-01-01 RX ADMIN — METHYLPREDNISOLONE SODIUM SUCCINATE 125 MG: 125 INJECTION, POWDER, FOR SOLUTION INTRAMUSCULAR; INTRAVENOUS at 16:37

## 2019-01-01 RX ADMIN — AMLODIPINE BESYLATE 10 MG: 5 TABLET ORAL at 09:21

## 2019-01-01 RX ADMIN — MELATONIN 500 UNITS: at 08:27

## 2019-01-01 RX ADMIN — GUAIFENESIN 400 MG: 100 SOLUTION ORAL at 20:23

## 2019-01-01 RX ADMIN — GUAIFENESIN 400 MG: 100 SOLUTION ORAL at 15:04

## 2019-01-01 RX ADMIN — ALPRAZOLAM 0.25 MG: 0.25 TABLET ORAL at 07:25

## 2019-01-01 RX ADMIN — IPRATROPIUM BROMIDE AND ALBUTEROL SULFATE 3 ML: .5; 3 SOLUTION RESPIRATORY (INHALATION) at 15:10

## 2019-01-01 RX ADMIN — METOPROLOL TARTRATE 50 MG: 50 TABLET, FILM COATED ORAL at 08:50

## 2019-01-01 RX ADMIN — GUAIFENESIN 400 MG: 100 SOLUTION ORAL at 20:33

## 2019-01-01 RX ADMIN — ATENOLOL 25 MG: 25 TABLET ORAL at 09:00

## 2019-01-01 RX ADMIN — ITRACONAZOLE 200 MG: 100 CAPSULE ORAL at 17:57

## 2019-01-01 RX ADMIN — HYDRALAZINE HYDROCHLORIDE 75 MG: 25 TABLET, FILM COATED ORAL at 08:08

## 2019-01-01 RX ADMIN — IPRATROPIUM BROMIDE AND ALBUTEROL SULFATE 3 ML: .5; 3 SOLUTION RESPIRATORY (INHALATION) at 06:53

## 2019-01-01 RX ADMIN — Medication 3 ML: at 20:37

## 2019-01-01 RX ADMIN — CHLORHEXIDINE GLUCONATE 0.12% ORAL RINSE 15 ML: 1.2 LIQUID ORAL at 10:10

## 2019-01-01 RX ADMIN — Medication 3 ML: at 08:25

## 2019-01-01 RX ADMIN — METOPROLOL TARTRATE 50 MG: 50 TABLET, FILM COATED ORAL at 08:28

## 2019-01-01 RX ADMIN — METHYLPREDNISOLONE SODIUM SUCCINATE 125 MG: 125 INJECTION, POWDER, FOR SOLUTION INTRAMUSCULAR; INTRAVENOUS at 14:43

## 2019-01-01 RX ADMIN — ITRACONAZOLE 200 MG: 100 CAPSULE ORAL at 19:27

## 2019-01-01 RX ADMIN — MELATONIN 500 UNITS: at 09:21

## 2019-01-01 RX ADMIN — GUAIFENESIN 400 MG: 100 SOLUTION ORAL at 20:37

## 2019-01-01 RX ADMIN — METHYLPREDNISOLONE SODIUM SUCCINATE 125 MG: 125 INJECTION, POWDER, FOR SOLUTION INTRAMUSCULAR; INTRAVENOUS at 03:58

## 2019-01-01 RX ADMIN — METHYLPREDNISOLONE SODIUM SUCCINATE 125 MG: 125 INJECTION, POWDER, FOR SOLUTION INTRAMUSCULAR; INTRAVENOUS at 15:15

## 2019-01-01 RX ADMIN — GUAIFENESIN 400 MG: 100 SOLUTION ORAL at 08:38

## 2019-01-01 RX ADMIN — DILTIAZEM HYDROCHLORIDE 5 MG/HR: 5 INJECTION INTRAVENOUS at 16:03

## 2019-01-01 RX ADMIN — CYCLOPHOSPHAMIDE 50 MG: 50 CAPSULE ORAL at 17:06

## 2019-01-01 RX ADMIN — PANTOPRAZOLE SODIUM 40 MG: 40 INJECTION, POWDER, FOR SOLUTION INTRAVENOUS at 08:01

## 2019-01-01 RX ADMIN — METHYLPREDNISOLONE SODIUM SUCCINATE 125 MG: 125 INJECTION, POWDER, FOR SOLUTION INTRAMUSCULAR; INTRAVENOUS at 03:27

## 2019-01-01 RX ADMIN — HYDRALAZINE HYDROCHLORIDE 75 MG: 25 TABLET, FILM COATED ORAL at 08:59

## 2019-01-01 RX ADMIN — Medication 3 ML: at 20:32

## 2019-01-01 RX ADMIN — CALCIUM GLUCONATE 4 G: 98 INJECTION, SOLUTION INTRAVENOUS at 17:15

## 2019-01-01 RX ADMIN — ITRACONAZOLE 200 MG: 100 CAPSULE ORAL at 08:41

## 2019-01-01 RX ADMIN — Medication 10 ML: at 08:50

## 2019-01-01 RX ADMIN — ENOXAPARIN SODIUM 40 MG: 40 INJECTION SUBCUTANEOUS at 15:04

## 2019-01-01 RX ADMIN — GUAIFENESIN 400 MG: 100 SOLUTION ORAL at 22:28

## 2019-01-01 RX ADMIN — BUMETANIDE 0.5 MG: 0.25 INJECTION INTRAMUSCULAR; INTRAVENOUS at 19:28

## 2019-01-01 RX ADMIN — ANTICOAGULANT CITRATE DEXTROSE SOLUTION FORMULA A 1000 ML: 12.25; 11; 3.65 SOLUTION INTRAVENOUS at 23:52

## 2019-01-01 RX ADMIN — MELATONIN 500 UNITS: at 09:28

## 2019-01-01 RX ADMIN — IPRATROPIUM BROMIDE AND ALBUTEROL SULFATE 3 ML: .5; 3 SOLUTION RESPIRATORY (INHALATION) at 00:21

## 2019-01-01 RX ADMIN — GUAIFENESIN 600 MG: 600 TABLET, EXTENDED RELEASE ORAL at 20:57

## 2019-01-01 RX ADMIN — INSULIN LISPRO 12 UNITS: 100 INJECTION, SOLUTION INTRAVENOUS; SUBCUTANEOUS at 06:44

## 2019-01-01 RX ADMIN — POTASSIUM CHLORIDE 40 MEQ: 1500 TABLET, EXTENDED RELEASE ORAL at 11:34

## 2019-01-01 RX ADMIN — ITRACONAZOLE 200 MG: 100 CAPSULE ORAL at 09:08

## 2019-01-01 RX ADMIN — GUAIFENESIN 400 MG: 100 SOLUTION ORAL at 21:00

## 2019-01-01 RX ADMIN — MAGNESIUM SULFATE HEPTAHYDRATE 1 G: 1 INJECTION, SOLUTION INTRAVENOUS at 09:31

## 2019-01-01 RX ADMIN — METHYLPREDNISOLONE SODIUM SUCCINATE 125 MG: 125 INJECTION, POWDER, FOR SOLUTION INTRAMUSCULAR; INTRAVENOUS at 15:23

## 2019-01-01 RX ADMIN — CHLORHEXIDINE GLUCONATE 0.12% ORAL RINSE 15 ML: 1.2 LIQUID ORAL at 08:03

## 2019-01-01 RX ADMIN — CEFEPIME HYDROCHLORIDE 2 G: 2 INJECTION, POWDER, FOR SOLUTION INTRAVENOUS at 06:18

## 2019-01-01 RX ADMIN — IPRATROPIUM BROMIDE AND ALBUTEROL SULFATE 3 ML: .5; 3 SOLUTION RESPIRATORY (INHALATION) at 11:10

## 2019-01-01 RX ADMIN — METHYLPREDNISOLONE SODIUM SUCCINATE 125 MG: 125 INJECTION, POWDER, FOR SOLUTION INTRAMUSCULAR; INTRAVENOUS at 21:29

## 2019-01-01 RX ADMIN — ACETAMINOPHEN 1000 MG: 500 TABLET, FILM COATED ORAL at 06:18

## 2019-01-01 RX ADMIN — METHYLPREDNISOLONE SODIUM SUCCINATE 125 MG: 125 INJECTION, POWDER, FOR SOLUTION INTRAMUSCULAR; INTRAVENOUS at 03:21

## 2019-01-01 RX ADMIN — GUAIFENESIN 400 MG: 100 SOLUTION ORAL at 16:12

## 2019-01-01 RX ADMIN — IPRATROPIUM BROMIDE AND ALBUTEROL SULFATE 3 ML: .5; 3 SOLUTION RESPIRATORY (INHALATION) at 19:59

## 2019-01-01 RX ADMIN — METHYLPREDNISOLONE SODIUM SUCCINATE 125 MG: 125 INJECTION, POWDER, FOR SOLUTION INTRAMUSCULAR; INTRAVENOUS at 10:41

## 2019-01-01 RX ADMIN — ATENOLOL 25 MG: 25 TABLET ORAL at 21:07

## 2019-01-01 RX ADMIN — CHLORHEXIDINE GLUCONATE 0.12% ORAL RINSE 15 ML: 1.2 LIQUID ORAL at 20:25

## 2019-01-01 RX ADMIN — GUAIFENESIN 400 MG: 100 SOLUTION ORAL at 11:39

## 2019-01-01 RX ADMIN — GUAIFENESIN 600 MG: 600 TABLET, EXTENDED RELEASE ORAL at 20:59

## 2019-01-01 RX ADMIN — AMLODIPINE BESYLATE 10 MG: 5 TABLET ORAL at 08:49

## 2019-01-01 RX ADMIN — IPRATROPIUM BROMIDE AND ALBUTEROL SULFATE 3 ML: .5; 3 SOLUTION RESPIRATORY (INHALATION) at 19:00

## 2019-01-01 RX ADMIN — ANTICOAGULANT CITRATE DEXTROSE SOLUTION FORMULA A 1000 ML: 12.25; 11; 3.65 SOLUTION INTRAVENOUS at 17:15

## 2019-01-01 RX ADMIN — BUMETANIDE 0.5 MG: 0.25 INJECTION INTRAMUSCULAR; INTRAVENOUS at 11:24

## 2019-01-01 RX ADMIN — METHYLPREDNISOLONE SODIUM SUCCINATE 125 MG: 125 INJECTION, POWDER, FOR SOLUTION INTRAMUSCULAR; INTRAVENOUS at 15:30

## 2019-01-01 RX ADMIN — METHYLPREDNISOLONE SODIUM SUCCINATE 125 MG: 125 INJECTION, POWDER, FOR SOLUTION INTRAMUSCULAR; INTRAVENOUS at 21:30

## 2019-01-01 RX ADMIN — POTASSIUM CHLORIDE 40 MEQ: 1.5 POWDER, FOR SOLUTION ORAL at 20:38

## 2019-01-01 RX ADMIN — Medication 3 ML: at 11:06

## 2019-01-01 RX ADMIN — CHLORHEXIDINE GLUCONATE 0.12% ORAL RINSE 15 ML: 1.2 LIQUID ORAL at 20:13

## 2019-01-01 RX ADMIN — ATENOLOL 25 MG: 25 TABLET ORAL at 21:11

## 2019-01-01 RX ADMIN — INSULIN LISPRO 4 UNITS: 100 INJECTION, SOLUTION INTRAVENOUS; SUBCUTANEOUS at 05:32

## 2019-01-01 RX ADMIN — Medication 10 ML: at 08:26

## 2019-01-01 RX ADMIN — GUAIFENESIN 400 MG: 100 SOLUTION ORAL at 04:59

## 2019-01-01 RX ADMIN — PREDNISONE 40 MG: 20 TABLET ORAL at 08:34

## 2019-01-01 RX ADMIN — Medication 10 ML: at 21:29

## 2019-01-01 RX ADMIN — METHYLPREDNISOLONE SODIUM SUCCINATE 125 MG: 125 INJECTION, POWDER, FOR SOLUTION INTRAMUSCULAR; INTRAVENOUS at 08:24

## 2019-01-01 RX ADMIN — PANTOPRAZOLE SODIUM 40 MG: 40 INJECTION, POWDER, FOR SOLUTION INTRAVENOUS at 06:04

## 2019-01-01 RX ADMIN — MELATONIN 500 UNITS: at 10:11

## 2019-01-01 RX ADMIN — CHLORHEXIDINE GLUCONATE 0.12% ORAL RINSE 15 ML: 1.2 LIQUID ORAL at 08:12

## 2019-01-01 RX ADMIN — MORPHINE SULFATE 2 MG: 4 INJECTION INTRAVENOUS at 05:17

## 2019-01-01 RX ADMIN — Medication 3 ML: at 21:20

## 2019-01-01 RX ADMIN — ALPRAZOLAM 1 MG: 1 TABLET ORAL at 08:39

## 2019-01-01 RX ADMIN — METHYLPREDNISOLONE SODIUM SUCCINATE 80 MG: 125 INJECTION, POWDER, FOR SOLUTION INTRAMUSCULAR; INTRAVENOUS at 20:13

## 2019-01-01 RX ADMIN — POTASSIUM CHLORIDE 40 MEQ: 1.5 POWDER, FOR SOLUTION ORAL at 13:35

## 2019-01-01 RX ADMIN — GUAIFENESIN 400 MG: 100 SOLUTION ORAL at 08:19

## 2019-01-01 RX ADMIN — ASPIRIN 81 MG 81 MG: 81 TABLET ORAL at 08:39

## 2019-01-01 RX ADMIN — CALCIUM GLUCONATE 2 G: 98 INJECTION, SOLUTION INTRAVENOUS at 21:02

## 2019-01-01 RX ADMIN — CHLORHEXIDINE GLUCONATE 0.12% ORAL RINSE 15 ML: 1.2 LIQUID ORAL at 09:16

## 2019-01-01 RX ADMIN — INSULIN LISPRO 4 UNITS: 100 INJECTION, SOLUTION INTRAVENOUS; SUBCUTANEOUS at 09:30

## 2019-01-01 RX ADMIN — METHYLPREDNISOLONE SODIUM SUCCINATE 80 MG: 125 INJECTION, POWDER, FOR SOLUTION INTRAMUSCULAR; INTRAVENOUS at 03:51

## 2019-01-01 RX ADMIN — GUAIFENESIN 400 MG: 100 SOLUTION ORAL at 20:30

## 2019-01-01 RX ADMIN — GUAIFENESIN 400 MG: 100 SOLUTION ORAL at 09:21

## 2019-01-01 RX ADMIN — HEPARIN SODIUM 3000 UNITS: 1000 INJECTION, SOLUTION INTRAVENOUS; SUBCUTANEOUS at 13:46

## 2019-01-01 RX ADMIN — INSULIN LISPRO 12 UNITS: 100 INJECTION, SOLUTION INTRAVENOUS; SUBCUTANEOUS at 05:58

## 2019-01-01 RX ADMIN — VANCOMYCIN HYDROCHLORIDE 1250 MG: 10 INJECTION, POWDER, LYOPHILIZED, FOR SOLUTION INTRAVENOUS at 06:08

## 2019-01-01 RX ADMIN — AMLODIPINE BESYLATE 10 MG: 5 TABLET ORAL at 22:16

## 2019-01-01 RX ADMIN — METOPROLOL TARTRATE 50 MG: 50 TABLET, FILM COATED ORAL at 11:09

## 2019-01-01 RX ADMIN — IPRATROPIUM BROMIDE AND ALBUTEROL SULFATE 3 ML: .5; 3 SOLUTION RESPIRATORY (INHALATION) at 12:18

## 2019-01-01 RX ADMIN — CYCLOPHOSPHAMIDE 50 MG: 50 CAPSULE ORAL at 08:40

## 2019-01-01 RX ADMIN — GUAIFENESIN 400 MG: 100 SOLUTION ORAL at 20:11

## 2019-01-01 RX ADMIN — MORPHINE SULFATE 4 MG: 4 INJECTION INTRAVENOUS at 20:01

## 2019-01-01 RX ADMIN — GUAIFENESIN 600 MG: 600 TABLET, EXTENDED RELEASE ORAL at 20:31

## 2019-01-01 RX ADMIN — METHYLPREDNISOLONE SODIUM SUCCINATE 80 MG: 125 INJECTION, POWDER, FOR SOLUTION INTRAMUSCULAR; INTRAVENOUS at 18:08

## 2019-01-01 RX ADMIN — METHYLPREDNISOLONE SODIUM SUCCINATE 125 MG: 125 INJECTION, POWDER, FOR SOLUTION INTRAMUSCULAR; INTRAVENOUS at 14:36

## 2019-01-01 RX ADMIN — INSULIN LISPRO 4 UNITS: 100 INJECTION, SOLUTION INTRAVENOUS; SUBCUTANEOUS at 16:39

## 2019-01-01 RX ADMIN — CALCIUM GLUCONATE 50 ML: 20 INJECTION, SOLUTION INTRAVENOUS at 20:30

## 2019-01-01 RX ADMIN — METHYLPREDNISOLONE SODIUM SUCCINATE 125 MG: 125 INJECTION, POWDER, FOR SOLUTION INTRAMUSCULAR; INTRAVENOUS at 03:01

## 2019-01-01 RX ADMIN — BUMETANIDE 2 MG: 0.25 INJECTION INTRAMUSCULAR; INTRAVENOUS at 03:35

## 2019-01-01 RX ADMIN — Medication: at 09:33

## 2019-01-01 RX ADMIN — Medication 10 ML: at 10:11

## 2019-01-01 RX ADMIN — CYCLOPHOSPHAMIDE 50 MG: 50 CAPSULE ORAL at 10:06

## 2019-01-01 RX ADMIN — CYCLOPHOSPHAMIDE 50 MG: 50 CAPSULE ORAL at 09:09

## 2019-01-01 RX ADMIN — RACEPINEPHRINE HYDROCHLORIDE 0.5 ML: 11.25 SOLUTION RESPIRATORY (INHALATION) at 14:15

## 2019-01-01 RX ADMIN — SULFAMETHOXAZOLE AND TRIMETHOPRIM 1 TABLET: 400; 80 TABLET ORAL at 17:08

## 2019-01-01 RX ADMIN — MIDAZOLAM 1 MG: 1 INJECTION INTRAMUSCULAR; INTRAVENOUS at 10:54

## 2019-01-01 RX ADMIN — LORAZEPAM 1 MG: 2 INJECTION INTRAMUSCULAR; INTRAVENOUS at 20:47

## 2019-01-01 RX ADMIN — INSULIN LISPRO 10 UNITS: 100 INJECTION, SOLUTION INTRAVENOUS; SUBCUTANEOUS at 18:15

## 2019-01-01 RX ADMIN — ALBUMIN HUMAN 25 G: 0.25 SOLUTION INTRAVENOUS at 03:00

## 2019-01-01 RX ADMIN — CEFEPIME HYDROCHLORIDE 2 G: 2 INJECTION, POWDER, FOR SOLUTION INTRAVENOUS at 14:19

## 2019-01-01 RX ADMIN — HYDRALAZINE HYDROCHLORIDE 75 MG: 25 TABLET, FILM COATED ORAL at 17:20

## 2019-01-01 RX ADMIN — EPOETIN ALFA-EPBX 20000 UNITS: 10000 INJECTION, SOLUTION INTRAVENOUS; SUBCUTANEOUS at 03:41

## 2019-01-01 RX ADMIN — ASPIRIN 81 MG 81 MG: 81 TABLET ORAL at 09:00

## 2019-01-01 RX ADMIN — MORPHINE SULFATE 4 MG: 4 INJECTION INTRAVENOUS at 02:43

## 2019-01-01 RX ADMIN — METOPROLOL TARTRATE 50 MG: 50 TABLET, FILM COATED ORAL at 20:30

## 2019-01-01 RX ADMIN — INSULIN LISPRO 8 UNITS: 100 INJECTION, SOLUTION INTRAVENOUS; SUBCUTANEOUS at 17:14

## 2019-01-01 RX ADMIN — HYDRALAZINE HYDROCHLORIDE 75 MG: 25 TABLET, FILM COATED ORAL at 14:05

## 2019-01-01 RX ADMIN — IPRATROPIUM BROMIDE AND ALBUTEROL SULFATE 3 ML: .5; 3 SOLUTION RESPIRATORY (INHALATION) at 15:33

## 2019-01-01 RX ADMIN — IPRATROPIUM BROMIDE AND ALBUTEROL SULFATE 3 ML: .5; 3 SOLUTION RESPIRATORY (INHALATION) at 14:59

## 2019-01-01 RX ADMIN — METHYLPREDNISOLONE SODIUM SUCCINATE 62.5 MG: 125 INJECTION, POWDER, FOR SOLUTION INTRAMUSCULAR; INTRAVENOUS at 01:35

## 2019-01-01 RX ADMIN — IPRATROPIUM BROMIDE AND ALBUTEROL SULFATE 3 ML: .5; 3 SOLUTION RESPIRATORY (INHALATION) at 11:17

## 2019-01-01 RX ADMIN — GUAIFENESIN 400 MG: 100 SOLUTION ORAL at 07:57

## 2019-01-01 RX ADMIN — ASPIRIN 81 MG 81 MG: 81 TABLET ORAL at 07:58

## 2019-01-01 RX ADMIN — ASPIRIN 81 MG 81 MG: 81 TABLET ORAL at 08:24

## 2019-01-01 RX ADMIN — PANTOPRAZOLE SODIUM 40 MG: 40 INJECTION, POWDER, FOR SOLUTION INTRAVENOUS at 08:30

## 2019-01-01 RX ADMIN — ONDANSETRON 4 MG: 2 INJECTION INTRAMUSCULAR; INTRAVENOUS at 07:15

## 2019-01-01 RX ADMIN — INSULIN LISPRO 4 UNITS: 100 INJECTION, SOLUTION INTRAVENOUS; SUBCUTANEOUS at 20:31

## 2019-01-01 RX ADMIN — METHYLPREDNISOLONE SODIUM SUCCINATE 125 MG: 125 INJECTION, POWDER, FOR SOLUTION INTRAMUSCULAR; INTRAVENOUS at 10:42

## 2019-01-01 RX ADMIN — Medication 10 ML: at 21:12

## 2019-01-01 RX ADMIN — Medication 400 UNITS: at 10:44

## 2019-01-01 RX ADMIN — RACEPINEPHRINE HYDROCHLORIDE 0.5 ML: 11.25 SOLUTION RESPIRATORY (INHALATION) at 06:48

## 2019-01-01 RX ADMIN — Medication 10 ML: at 09:10

## 2019-01-01 RX ADMIN — Medication 10 ML: at 21:10

## 2019-01-01 RX ADMIN — IPRATROPIUM BROMIDE AND ALBUTEROL SULFATE 3 ML: .5; 3 SOLUTION RESPIRATORY (INHALATION) at 11:03

## 2019-01-01 RX ADMIN — METHYLPREDNISOLONE SODIUM SUCCINATE 125 MG: 125 INJECTION, POWDER, FOR SOLUTION INTRAMUSCULAR; INTRAVENOUS at 22:27

## 2019-01-01 RX ADMIN — ANTICOAGULANT CITRATE DEXTROSE SOLUTION FORMULA A 1000 ML: 12.25; 11; 3.65 SOLUTION INTRAVENOUS at 21:35

## 2019-01-01 RX ADMIN — RACEPINEPHRINE HYDROCHLORIDE 0.5 ML: 11.25 SOLUTION RESPIRATORY (INHALATION) at 19:09

## 2019-01-01 RX ADMIN — METOPROLOL TARTRATE 5 MG: 5 INJECTION INTRAVENOUS at 03:38

## 2019-01-01 RX ADMIN — ITRACONAZOLE 200 MG: 100 CAPSULE ORAL at 08:48

## 2019-01-01 RX ADMIN — BUMETANIDE 2 MG: 0.25 INJECTION INTRAMUSCULAR; INTRAVENOUS at 03:58

## 2019-01-01 RX ADMIN — Medication 3 ML: at 20:25

## 2019-01-01 RX ADMIN — METHYLPREDNISOLONE SODIUM SUCCINATE 125 MG: 125 INJECTION, POWDER, FOR SOLUTION INTRAMUSCULAR; INTRAVENOUS at 20:33

## 2019-01-01 RX ADMIN — IPRATROPIUM BROMIDE AND ALBUTEROL SULFATE 3 ML: .5; 3 SOLUTION RESPIRATORY (INHALATION) at 19:48

## 2019-01-01 RX ADMIN — Medication 10 ML: at 09:09

## 2019-01-01 RX ADMIN — ITRACONAZOLE 200 MG: 100 CAPSULE ORAL at 07:44

## 2019-01-01 RX ADMIN — METHYLPREDNISOLONE SODIUM SUCCINATE 125 MG: 125 INJECTION, POWDER, FOR SOLUTION INTRAMUSCULAR; INTRAVENOUS at 05:42

## 2019-01-01 RX ADMIN — ATENOLOL 25 MG: 25 TABLET ORAL at 08:19

## 2019-01-01 RX ADMIN — INSULIN LISPRO 4 UNITS: 100 INJECTION, SOLUTION INTRAVENOUS; SUBCUTANEOUS at 21:10

## 2019-01-01 RX ADMIN — IPRATROPIUM BROMIDE AND ALBUTEROL SULFATE 3 ML: .5; 3 SOLUTION RESPIRATORY (INHALATION) at 07:56

## 2019-01-01 RX ADMIN — GUAIFENESIN 400 MG: 100 SOLUTION ORAL at 22:38

## 2019-01-01 RX ADMIN — METHYLPREDNISOLONE SODIUM SUCCINATE 125 MG: 125 INJECTION, POWDER, FOR SOLUTION INTRAMUSCULAR; INTRAVENOUS at 09:28

## 2019-01-01 RX ADMIN — METHYLPREDNISOLONE SODIUM SUCCINATE 125 MG: 125 INJECTION, POWDER, FOR SOLUTION INTRAMUSCULAR; INTRAVENOUS at 15:29

## 2019-01-01 RX ADMIN — Medication 10 ML: at 08:11

## 2019-01-01 RX ADMIN — PANTOPRAZOLE SODIUM 40 MG: 40 INJECTION, POWDER, FOR SOLUTION INTRAVENOUS at 08:49

## 2019-01-01 RX ADMIN — IPRATROPIUM BROMIDE AND ALBUTEROL SULFATE 3 ML: .5; 3 SOLUTION RESPIRATORY (INHALATION) at 12:25

## 2019-01-01 RX ADMIN — METHYLPREDNISOLONE SODIUM SUCCINATE 125 MG: 125 INJECTION, POWDER, FOR SOLUTION INTRAMUSCULAR; INTRAVENOUS at 15:21

## 2019-01-01 RX ADMIN — PREDNISONE 40 MG: 20 TABLET ORAL at 11:09

## 2019-01-01 RX ADMIN — METHYLPREDNISOLONE SODIUM SUCCINATE 125 MG: 125 INJECTION, POWDER, FOR SOLUTION INTRAMUSCULAR; INTRAVENOUS at 03:10

## 2019-01-01 RX ADMIN — BUMETANIDE 1 MG: 0.25 INJECTION INTRAMUSCULAR; INTRAVENOUS at 18:47

## 2019-01-01 RX ADMIN — METHYLPREDNISOLONE SODIUM SUCCINATE 125 MG: 125 INJECTION, POWDER, FOR SOLUTION INTRAMUSCULAR; INTRAVENOUS at 20:38

## 2019-01-01 RX ADMIN — METHYLPREDNISOLONE SODIUM SUCCINATE 125 MG: 125 INJECTION, POWDER, FOR SOLUTION INTRAMUSCULAR; INTRAVENOUS at 21:09

## 2019-01-01 RX ADMIN — CHLORHEXIDINE GLUCONATE 0.12% ORAL RINSE 15 ML: 1.2 LIQUID ORAL at 08:40

## 2019-01-01 RX ADMIN — CEFEPIME HYDROCHLORIDE 2 G: 2 INJECTION, POWDER, FOR SOLUTION INTRAVENOUS at 06:27

## 2019-01-01 RX ADMIN — MELATONIN 500 UNITS: at 10:04

## 2019-01-01 RX ADMIN — BUMETANIDE 0.5 MG: 0.25 INJECTION INTRAMUSCULAR; INTRAVENOUS at 03:06

## 2019-01-01 RX ADMIN — LORAZEPAM 1 MG: 2 INJECTION INTRAMUSCULAR; INTRAVENOUS at 18:46

## 2019-01-01 RX ADMIN — LORAZEPAM 1 MG: 2 INJECTION INTRAMUSCULAR; INTRAVENOUS at 00:29

## 2019-01-01 RX ADMIN — Medication 10 ML: at 08:36

## 2019-01-01 RX ADMIN — METHYLPREDNISOLONE SODIUM SUCCINATE 125 MG: 125 INJECTION, POWDER, FOR SOLUTION INTRAMUSCULAR; INTRAVENOUS at 02:50

## 2019-01-01 RX ADMIN — GUAIFENESIN 400 MG: 100 SOLUTION ORAL at 03:58

## 2019-01-01 RX ADMIN — PROPOFOL 30 MCG/KG/MIN: 10 INJECTION, EMULSION INTRAVENOUS at 06:56

## 2019-01-01 RX ADMIN — POTASSIUM CHLORIDE 40 MEQ: 1.5 POWDER, FOR SOLUTION ORAL at 09:21

## 2019-01-01 RX ADMIN — MELATONIN 500 UNITS: at 07:58

## 2019-01-01 RX ADMIN — Medication 10 ML: at 08:31

## 2019-01-01 RX ADMIN — METHYLPREDNISOLONE SODIUM SUCCINATE 125 MG: 125 INJECTION, POWDER, FOR SOLUTION INTRAMUSCULAR; INTRAVENOUS at 03:35

## 2019-01-01 RX ADMIN — MAGNESIUM SULFATE HEPTAHYDRATE 1 G: 1 INJECTION, SOLUTION INTRAVENOUS at 20:58

## 2019-01-01 RX ADMIN — GUAIFENESIN 400 MG: 100 SOLUTION ORAL at 00:08

## 2019-01-01 RX ADMIN — METHYLPREDNISOLONE SODIUM SUCCINATE 125 MG: 125 INJECTION, POWDER, FOR SOLUTION INTRAMUSCULAR; INTRAVENOUS at 10:10

## 2019-01-01 RX ADMIN — PREDNISONE 40 MG: 20 TABLET ORAL at 07:57

## 2019-01-01 RX ADMIN — Medication 10 ML: at 20:31

## 2019-01-01 RX ADMIN — METHYLPREDNISOLONE SODIUM SUCCINATE 125 MG: 125 INJECTION, POWDER, FOR SOLUTION INTRAMUSCULAR; INTRAVENOUS at 03:38

## 2019-01-01 RX ADMIN — METHYLPREDNISOLONE SODIUM SUCCINATE 125 MG: 125 INJECTION, POWDER, FOR SOLUTION INTRAMUSCULAR; INTRAVENOUS at 20:47

## 2019-01-01 RX ADMIN — GUAIFENESIN 400 MG: 100 SOLUTION ORAL at 03:16

## 2019-01-01 RX ADMIN — CALCIUM GLUCONATE 4 G: 98 INJECTION, SOLUTION INTRAVENOUS at 13:33

## 2019-01-01 RX ADMIN — INSULIN LISPRO 4 UNITS: 100 INJECTION, SOLUTION INTRAVENOUS; SUBCUTANEOUS at 12:33

## 2019-01-01 RX ADMIN — IPRATROPIUM BROMIDE AND ALBUTEROL SULFATE 3 ML: .5; 3 SOLUTION RESPIRATORY (INHALATION) at 01:02

## 2019-01-01 RX ADMIN — IPRATROPIUM BROMIDE AND ALBUTEROL SULFATE 3 ML: .5; 3 SOLUTION RESPIRATORY (INHALATION) at 15:29

## 2019-01-01 RX ADMIN — GUAIFENESIN 400 MG: 100 SOLUTION ORAL at 23:37

## 2019-01-01 RX ADMIN — HYDRALAZINE HYDROCHLORIDE 75 MG: 25 TABLET, FILM COATED ORAL at 15:15

## 2019-01-01 RX ADMIN — PANTOPRAZOLE SODIUM 40 MG: 40 INJECTION, POWDER, FOR SOLUTION INTRAVENOUS at 08:10

## 2019-01-01 RX ADMIN — CHLORHEXIDINE GLUCONATE 0.12% ORAL RINSE 15 ML: 1.2 LIQUID ORAL at 08:27

## 2019-01-01 RX ADMIN — RACEPINEPHRINE HYDROCHLORIDE 0.5 ML: 11.25 SOLUTION RESPIRATORY (INHALATION) at 15:00

## 2019-01-01 RX ADMIN — Medication 10 ML: at 23:04

## 2019-01-01 RX ADMIN — SODIUM CHLORIDE 50 ML/HR: 900 INJECTION, SOLUTION INTRAVENOUS at 17:09

## 2019-01-01 RX ADMIN — PANTOPRAZOLE SODIUM 40 MG: 40 INJECTION, POWDER, FOR SOLUTION INTRAVENOUS at 07:59

## 2019-01-01 RX ADMIN — PANTOPRAZOLE SODIUM 40 MG: 40 TABLET, DELAYED RELEASE ORAL at 06:39

## 2019-01-01 RX ADMIN — METOPROLOL TARTRATE 50 MG: 50 TABLET, FILM COATED ORAL at 09:18

## 2019-01-01 RX ADMIN — INSULIN LISPRO 8 UNITS: 100 INJECTION, SOLUTION INTRAVENOUS; SUBCUTANEOUS at 12:59

## 2019-01-01 RX ADMIN — Medication 3 ML: at 09:10

## 2019-01-01 RX ADMIN — CHLORHEXIDINE GLUCONATE 0.12% ORAL RINSE 15 ML: 1.2 LIQUID ORAL at 21:31

## 2019-01-01 RX ADMIN — BUMETANIDE 1 MG: 0.25 INJECTION INTRAMUSCULAR; INTRAVENOUS at 08:32

## 2019-01-01 RX ADMIN — Medication 10 ML: at 21:00

## 2019-01-01 RX ADMIN — GUAIFENESIN 400 MG: 100 SOLUTION ORAL at 08:58

## 2019-01-01 RX ADMIN — GUAIFENESIN 400 MG: 100 SOLUTION ORAL at 10:06

## 2019-01-01 RX ADMIN — GUAIFENESIN 400 MG: 100 SOLUTION ORAL at 15:23

## 2019-01-01 RX ADMIN — ANTICOAGULANT CITRATE DEXTROSE SOLUTION FORMULA A 1000 ML: 12.25; 11; 3.65 SOLUTION INTRAVENOUS at 21:24

## 2019-01-01 RX ADMIN — CEFEPIME HYDROCHLORIDE 2 G: 2 INJECTION, POWDER, FOR SOLUTION INTRAVENOUS at 21:11

## 2019-01-01 RX ADMIN — PROPOFOL 30 MCG/KG/MIN: 10 INJECTION, EMULSION INTRAVENOUS at 03:00

## 2019-01-01 RX ADMIN — LORAZEPAM 1 MG: 2 INJECTION INTRAMUSCULAR; INTRAVENOUS at 22:29

## 2019-01-01 RX ADMIN — RITUXIMAB 760 MG: 10 INJECTION, SOLUTION INTRAVENOUS at 17:46

## 2019-01-01 RX ADMIN — MORPHINE SULFATE 2 MG: 4 INJECTION INTRAVENOUS at 20:19

## 2019-01-01 RX ADMIN — RACEPINEPHRINE HYDROCHLORIDE 0.5 ML: 11.25 SOLUTION RESPIRATORY (INHALATION) at 07:13

## 2019-01-01 RX ADMIN — IPRATROPIUM BROMIDE AND ALBUTEROL SULFATE 3 ML: .5; 3 SOLUTION RESPIRATORY (INHALATION) at 06:25

## 2019-01-01 RX ADMIN — IPRATROPIUM BROMIDE AND ALBUTEROL SULFATE 3 ML: .5; 3 SOLUTION RESPIRATORY (INHALATION) at 19:06

## 2019-01-01 RX ADMIN — CYCLOPHOSPHAMIDE 50 MG: 50 CAPSULE ORAL at 09:21

## 2019-01-01 RX ADMIN — ACETAMINOPHEN 650 MG: 325 TABLET, FILM COATED ORAL at 08:51

## 2019-01-01 RX ADMIN — IPRATROPIUM BROMIDE AND ALBUTEROL SULFATE 3 ML: .5; 3 SOLUTION RESPIRATORY (INHALATION) at 11:40

## 2019-01-01 RX ADMIN — INSULIN LISPRO 12 UNITS: 100 INJECTION, SOLUTION INTRAVENOUS; SUBCUTANEOUS at 18:36

## 2019-01-01 RX ADMIN — BUMETANIDE 2 MG: 0.25 INJECTION INTRAMUSCULAR; INTRAVENOUS at 15:21

## 2019-01-01 RX ADMIN — GUAIFENESIN 600 MG: 600 TABLET, EXTENDED RELEASE ORAL at 09:30

## 2019-01-01 RX ADMIN — METHYLPREDNISOLONE SODIUM SUCCINATE 125 MG: 125 INJECTION, POWDER, FOR SOLUTION INTRAMUSCULAR; INTRAVENOUS at 09:21

## 2019-01-01 RX ADMIN — INSULIN LISPRO 4 UNITS: 100 INJECTION, SOLUTION INTRAVENOUS; SUBCUTANEOUS at 12:05

## 2019-01-01 RX ADMIN — PROPOFOL 50 MCG/KG/MIN: 10 INJECTION, EMULSION INTRAVENOUS at 22:38

## 2019-01-01 RX ADMIN — HYDRALAZINE HYDROCHLORIDE 75 MG: 25 TABLET, FILM COATED ORAL at 08:20

## 2019-01-01 RX ADMIN — CYCLOPHOSPHAMIDE 50 MG: 50 CAPSULE ORAL at 11:21

## 2019-01-01 RX ADMIN — Medication 400 UNITS: at 08:02

## 2019-01-01 RX ADMIN — ALBUMIN HUMAN 25 G: 0.25 SOLUTION INTRAVENOUS at 11:21

## 2019-01-01 RX ADMIN — METOPROLOL TARTRATE 50 MG: 50 TABLET, FILM COATED ORAL at 20:13

## 2019-01-01 RX ADMIN — HYDROCODONE BITARTRATE AND HOMATROPINE METHYLBROMIDE 5 ML: 5; 1.5 SOLUTION ORAL at 18:45

## 2019-01-01 RX ADMIN — IPRATROPIUM BROMIDE AND ALBUTEROL SULFATE 3 ML: .5; 3 SOLUTION RESPIRATORY (INHALATION) at 07:20

## 2019-01-01 RX ADMIN — METHYLPREDNISOLONE SODIUM SUCCINATE 125 MG: 125 INJECTION, POWDER, FOR SOLUTION INTRAMUSCULAR; INTRAVENOUS at 21:19

## 2019-01-01 RX ADMIN — CALCIUM GLUCONATE 4 G: 98 INJECTION, SOLUTION INTRAVENOUS at 23:50

## 2019-01-01 RX ADMIN — CALCIUM GLUCONATE 2 G: 98 INJECTION, SOLUTION INTRAVENOUS at 08:38

## 2019-01-01 RX ADMIN — FUROSEMIDE 40 MG: 10 INJECTION, SOLUTION INTRAVENOUS at 08:59

## 2019-01-01 RX ADMIN — CHLORHEXIDINE GLUCONATE 0.12% ORAL RINSE 15 ML: 1.2 LIQUID ORAL at 21:00

## 2019-01-01 RX ADMIN — INSULIN LISPRO 12 UNITS: 100 INJECTION, SOLUTION INTRAVENOUS; SUBCUTANEOUS at 00:07

## 2019-01-01 RX ADMIN — RACEPINEPHRINE HYDROCHLORIDE 0.5 ML: 11.25 SOLUTION RESPIRATORY (INHALATION) at 06:59

## 2019-01-01 RX ADMIN — INSULIN LISPRO 8 UNITS: 100 INJECTION, SOLUTION INTRAVENOUS; SUBCUTANEOUS at 17:56

## 2019-01-01 RX ADMIN — IPRATROPIUM BROMIDE AND ALBUTEROL SULFATE 3 ML: .5; 3 SOLUTION RESPIRATORY (INHALATION) at 18:40

## 2019-01-01 RX ADMIN — CALCIUM GLUCONATE 4 G: 98 INJECTION, SOLUTION INTRAVENOUS at 20:45

## 2019-01-01 RX ADMIN — HYDRALAZINE HYDROCHLORIDE 75 MG: 25 TABLET, FILM COATED ORAL at 21:10

## 2019-01-01 RX ADMIN — HYDRALAZINE HYDROCHLORIDE 75 MG: 25 TABLET, FILM COATED ORAL at 20:11

## 2019-01-01 RX ADMIN — CHLORHEXIDINE GLUCONATE 0.12% ORAL RINSE 15 ML: 1.2 LIQUID ORAL at 21:10

## 2019-01-01 RX ADMIN — INSULIN LISPRO 12 UNITS: 100 INJECTION, SOLUTION INTRAVENOUS; SUBCUTANEOUS at 13:37

## 2019-01-01 RX ADMIN — IPRATROPIUM BROMIDE AND ALBUTEROL SULFATE 3 ML: .5; 3 SOLUTION RESPIRATORY (INHALATION) at 19:14

## 2019-01-01 RX ADMIN — GUAIFENESIN 400 MG: 100 SOLUTION ORAL at 21:29

## 2019-01-01 RX ADMIN — Medication 3 ML: at 21:02

## 2019-01-01 RX ADMIN — NITROGLYCERIN 1 INCH: 20 OINTMENT TOPICAL at 15:04

## 2019-01-01 RX ADMIN — Medication 3 ML: at 09:26

## 2019-01-01 RX ADMIN — MORPHINE SULFATE 2 MG: 4 INJECTION INTRAVENOUS at 00:30

## 2019-01-01 RX ADMIN — ASPIRIN 81 MG 81 MG: 81 TABLET ORAL at 21:15

## 2019-01-01 RX ADMIN — PANTOPRAZOLE SODIUM 40 MG: 40 INJECTION, POWDER, FOR SOLUTION INTRAVENOUS at 12:35

## 2019-01-01 RX ADMIN — FUROSEMIDE 80 MG: 10 INJECTION, SOLUTION INTRAVENOUS at 05:42

## 2019-01-01 RX ADMIN — METOPROLOL TARTRATE 5 MG: 5 INJECTION INTRAVENOUS at 07:25

## 2019-01-01 RX ADMIN — DILTIAZEM HYDROCHLORIDE 20 MCG: 5 INJECTION INTRAVENOUS at 15:20

## 2019-01-01 RX ADMIN — FENTANYL CITRATE 50 MCG: 50 INJECTION, SOLUTION INTRAMUSCULAR; INTRAVENOUS at 10:16

## 2019-01-01 RX ADMIN — METHYLPREDNISOLONE SODIUM SUCCINATE 125 MG: 125 INJECTION, POWDER, FOR SOLUTION INTRAMUSCULAR; INTRAVENOUS at 15:17

## 2019-01-01 RX ADMIN — PROPOFOL 30 MCG/KG/MIN: 10 INJECTION, EMULSION INTRAVENOUS at 23:18

## 2019-01-01 RX ADMIN — GUAIFENESIN 400 MG: 100 SOLUTION ORAL at 23:17

## 2019-01-01 RX ADMIN — VANCOMYCIN HYDROCHLORIDE 1500 MG: 10 INJECTION, POWDER, LYOPHILIZED, FOR SOLUTION INTRAVENOUS at 06:21

## 2019-01-01 RX ADMIN — Medication 3 ML: at 08:11

## 2019-01-01 RX ADMIN — MIDAZOLAM 1 MG: 1 INJECTION INTRAMUSCULAR; INTRAVENOUS at 10:50

## 2019-01-01 RX ADMIN — MORPHINE SULFATE 2 MG: 4 INJECTION INTRAVENOUS at 17:32

## 2019-01-01 RX ADMIN — CALCIUM GLUCONATE 4 G: 98 INJECTION, SOLUTION INTRAVENOUS at 20:35

## 2019-01-01 RX ADMIN — POTASSIUM CHLORIDE 20 MEQ: 1500 TABLET, EXTENDED RELEASE ORAL at 20:59

## 2019-01-01 RX ADMIN — CALCIUM GLUCONATE 50 ML: 20 INJECTION, SOLUTION INTRAVENOUS at 08:48

## 2019-01-01 RX ADMIN — IPRATROPIUM BROMIDE AND ALBUTEROL SULFATE 3 ML: .5; 3 SOLUTION RESPIRATORY (INHALATION) at 20:21

## 2019-01-01 RX ADMIN — BUMETANIDE 1 MG: 0.25 INJECTION INTRAMUSCULAR; INTRAVENOUS at 11:30

## 2019-01-01 RX ADMIN — PANTOPRAZOLE SODIUM 40 MG: 40 INJECTION, POWDER, FOR SOLUTION INTRAVENOUS at 08:34

## 2019-01-01 RX ADMIN — GUAIFENESIN 400 MG: 100 SOLUTION ORAL at 15:00

## 2019-01-01 RX ADMIN — ALPRAZOLAM 0.25 MG: 0.5 TABLET ORAL at 22:41

## 2019-01-01 RX ADMIN — GUAIFENESIN 400 MG: 100 SOLUTION ORAL at 00:59

## 2019-01-01 RX ADMIN — BUMETANIDE 2 MG: 0.25 INJECTION INTRAMUSCULAR; INTRAVENOUS at 16:14

## 2019-01-01 RX ADMIN — INSULIN LISPRO 8 UNITS: 100 INJECTION, SOLUTION INTRAVENOUS; SUBCUTANEOUS at 19:31

## 2019-01-01 RX ADMIN — GUAIFENESIN 400 MG: 100 SOLUTION ORAL at 12:59

## 2019-01-01 RX ADMIN — METHYLPREDNISOLONE SODIUM SUCCINATE 125 MG: 125 INJECTION, POWDER, FOR SOLUTION INTRAMUSCULAR; INTRAVENOUS at 08:30

## 2019-01-01 RX ADMIN — RACEPINEPHRINE HYDROCHLORIDE 0.5 ML: 11.25 SOLUTION RESPIRATORY (INHALATION) at 07:40

## 2019-01-01 RX ADMIN — PROPOFOL 25 MCG/KG/MIN: 10 INJECTION, EMULSION INTRAVENOUS at 08:31

## 2019-01-01 RX ADMIN — IPRATROPIUM BROMIDE AND ALBUTEROL SULFATE 3 ML: .5; 3 SOLUTION RESPIRATORY (INHALATION) at 15:53

## 2019-01-01 RX ADMIN — EPOETIN ALFA-EPBX 10000 UNITS: 10000 INJECTION, SOLUTION INTRAVENOUS; SUBCUTANEOUS at 12:18

## 2019-01-01 RX ADMIN — PREDNISONE 20 MG: 20 TABLET ORAL at 08:19

## 2019-01-01 RX ADMIN — METHYLPREDNISOLONE SODIUM SUCCINATE 125 MG: 125 INJECTION, POWDER, FOR SOLUTION INTRAMUSCULAR; INTRAVENOUS at 15:51

## 2019-01-01 RX ADMIN — GUAIFENESIN 400 MG: 100 SOLUTION ORAL at 16:13

## 2019-01-01 RX ADMIN — METOPROLOL TARTRATE 50 MG: 50 TABLET, FILM COATED ORAL at 22:27

## 2019-01-01 RX ADMIN — INSULIN LISPRO 12 UNITS: 100 INJECTION, SOLUTION INTRAVENOUS; SUBCUTANEOUS at 23:35

## 2019-01-01 RX ADMIN — GUAIFENESIN 400 MG: 100 SOLUTION ORAL at 08:10

## 2019-01-01 RX ADMIN — CALCIUM GLUCONATE 50 ML: 20 INJECTION, SOLUTION INTRAVENOUS at 11:08

## 2019-01-01 RX ADMIN — Medication 10 ML: at 09:22

## 2019-01-01 RX ADMIN — GUAIFENESIN 400 MG: 100 SOLUTION ORAL at 03:01

## 2019-01-01 RX ADMIN — POTASSIUM CHLORIDE 20 MEQ: 1500 TABLET, EXTENDED RELEASE ORAL at 11:32

## 2019-01-01 RX ADMIN — GUAIFENESIN 400 MG: 100 SOLUTION ORAL at 13:35

## 2019-01-01 RX ADMIN — ANTICOAGULANT CITRATE DEXTROSE SOLUTION FORMULA A 1000 ML: 12.25; 11; 3.65 SOLUTION INTRAVENOUS at 20:45

## 2019-01-01 RX ADMIN — LORAZEPAM 1 MG: 2 INJECTION INTRAMUSCULAR; INTRAVENOUS at 20:30

## 2019-01-01 RX ADMIN — ALBUTEROL SULFATE 2.5 MG: 2.5 SOLUTION RESPIRATORY (INHALATION) at 04:20

## 2019-01-01 RX ADMIN — IPRATROPIUM BROMIDE AND ALBUTEROL SULFATE 3 ML: .5; 3 SOLUTION RESPIRATORY (INHALATION) at 07:13

## 2019-01-01 RX ADMIN — IRON SUCROSE 100 MG: 20 INJECTION, SOLUTION INTRAVENOUS at 20:30

## 2019-01-01 RX ADMIN — Medication 10 ML: at 08:42

## 2019-01-01 RX ADMIN — GUAIFENESIN 400 MG: 100 SOLUTION ORAL at 02:08

## 2019-01-01 RX ADMIN — Medication 10 ML: at 11:05

## 2019-01-01 RX ADMIN — METOPROLOL TARTRATE 50 MG: 50 TABLET, FILM COATED ORAL at 20:31

## 2019-01-01 RX ADMIN — ALBUMIN HUMAN 25 G: 0.25 SOLUTION INTRAVENOUS at 20:52

## 2019-01-01 RX ADMIN — RACEPINEPHRINE HYDROCHLORIDE 0.5 ML: 11.25 SOLUTION RESPIRATORY (INHALATION) at 06:20

## 2019-01-01 RX ADMIN — RISPERIDONE 0.5 MG: 0.25 TABLET ORAL at 17:32

## 2019-01-01 RX ADMIN — CEFEPIME HYDROCHLORIDE 2 G: 2 INJECTION, POWDER, FOR SOLUTION INTRAVENOUS at 21:08

## 2019-01-01 RX ADMIN — HEPARIN SODIUM 3000 UNITS: 1000 INJECTION INTRAVENOUS; SUBCUTANEOUS at 06:10

## 2019-01-01 RX ADMIN — SULFAMETHOXAZOLE AND TRIMETHOPRIM 1 TABLET: 400; 80 TABLET ORAL at 09:30

## 2019-01-01 RX ADMIN — CEFEPIME HYDROCHLORIDE 2 G: 2 INJECTION, POWDER, FOR SOLUTION INTRAVENOUS at 13:42

## 2019-01-01 RX ADMIN — ANTICOAGULANT CITRATE DEXTROSE SOLUTION FORMULA A 1000 ML: 12.25; 11; 3.65 SOLUTION INTRAVENOUS at 13:34

## 2019-06-20 NOTE — PROGRESS NOTES
Visit Type:  Post Op    CC:  Right ATHR 5/29/19.      Vital Signs:    Patient Profile:    68 Years Old Female  Height:     67 inches (170.18 cm)  Weight:     227 pounds  BMI:        35.55         No Known Allergy.  No Known Drug Allergy.        Vitals Entered By: Joselin Aragon CMA (June 13, 2019 10:10 AM)      Past Medical History:     Reviewed history from 05/07/2019 and no changes required:        stable angina pectoris         hx of small caliber LAD in 1999        Hyperlipidemia        Hypertension        cpap        Sleep Apnea        No Drug Allergies?     Past Surgical History:     Reviewed history from 01/08/2018 and no changes required:        Cholecystectomy        Knee Arthroscopy rt         Total Abdominal Hysterectomy        Cataract surgery 2017        laparscopic surgery 1984         mitral valve prolapse         tubal igation         Right ATHR 5/29/19 Dr. Kelly    Active Medications:  PERCOCET 5-325 MG ORAL TABLET (OXYCODONE-ACETAMINOPHEN) 1 tablet by mouth every 4-6 hours  FEOSOL BIFERA 28 MG ORAL TABLET (POLYSACCH FE CMP-FE HEME POLY) 1 po qday  KEFLEX 500 MG ORAL CAPSULE (CEPHALEXIN) 1 po BID  MUPIROCIN 2 % EXTERNAL OINTMENT (MUPIROCIN) appy pea sized amount each nostril BID for 5 days prior to surgery or as directed by your doctor  VITAMIN D3 GUMMIES ADULT TABLET CHEWABLE (CHOLECALCIFEROL CHEW) Take 1 tablet by mouth daily  TYLENOL 325 MG ORAL TABLET (ACETAMINOPHEN) Take as needed.  TRIAMTERENE-HCTZ 37.5-25 MG ORAL TABLET (TRIAMTERENE-HCTZ) Take 1 tablet by mouth daily  ATENOLOL 25 MG ORAL TABLET (ATENOLOL) TAKE 1 TABLET BY MOUTH EVERY MORNING  LISINOPRIL 30MG TABLETS (LISINOPRIL) TAKE 1 TABLET BY MOUTH TWICE DAILY  HYDRALAZINE HCL 25 MG ORAL TABLET (HYDRALAZINE HCL) Take 1 1/2 tablet by mouth three times a day  ALEVE CAPSULE (NAPROXEN SODIUM CAPS) as directed prn pain  MULTI VITAMIN/MINERALS ORAL TABLET (MULTIPLE VITAMINS-MINERALS) Take 1 tablet by mouth daily  FLAX SEED OIL CAPSULE (FLAXSEED  (LINSEED) CAPS) 2-3 times a week  ADVIL 200 MG ORAL CAPSULE (IBUPROFEN) PRN    Current Allergies:  No known allergies    Current Medications (including medications started today):   XARELTO TABLET (RIVAROXABAN TABS)   PERCOCET 5-325 MG ORAL TABLET (OXYCODONE-ACETAMINOPHEN) 1 tablet by mouth every 4-6 hours  FEOSOL BIFERA 28 MG ORAL TABLET (POLYSACCH FE CMP-FE HEME POLY) 1 po qday  VITAMIN D3 GUMMIES ADULT TABLET CHEWABLE (CHOLECALCIFEROL CHEW) Take 1 tablet by mouth daily  TYLENOL 325 MG ORAL TABLET (ACETAMINOPHEN) Take as needed.  TRIAMTERENE-HCTZ 37.5-25 MG ORAL TABLET (TRIAMTERENE-HCTZ) Take 1 tablet by mouth daily  ATENOLOL 25 MG ORAL TABLET (ATENOLOL) TAKE 1 TABLET BY MOUTH EVERY MORNING  LISINOPRIL 30MG TABLETS (LISINOPRIL) TAKE 1 TABLET BY MOUTH TWICE DAILY  HYDRALAZINE HCL 25 MG ORAL TABLET (HYDRALAZINE HCL) Take 1 1/2 tablet by mouth three times a day  MULTI VITAMIN/MINERALS ORAL TABLET (MULTIPLE VITAMINS-MINERALS) Take 1 tablet by mouth daily  FLAX SEED OIL CAPSULE (FLAXSEED (LINSEED) CAPS) 2-3 times a week      Post Operative History:   Facility:       [facility]  Surgeon:        Leticia  Surgery date:       05/29/2019  Procedure performed: Right ATHR   Days post-op:       15        POST OPERATIVE VISIT     CHIEF COMPLAINT:    Arminda Nogueira presents about 2 weeks out from having a right ATHA. She reports that she is doing well with mild intermittent discomfort.    PHYSICAL EXAMINATION:    Alert, oriented, obese lady in no acute distress, walking with the assistance of a walker  Right LE shows a well healing surgical incision with no erythema, drainage, or open skin lesions. There is a minimal amount of swelling in the thigh area. It is grossly well aligned, and she is neurovascularly intact distally. There is mild tenderness to   palpation and with ROM, which is smooth.    X-RAY EXAMINATION:    The prosthesis appears in good alignment with no bony or implant failure.      ASSESSMENT:    2 weeks s/p right  ATHA    PLAN:    At this time, we will plan to see the patient back in about 6 weeks. The patient should continue PT, WBAT, and call with any problems.        Impression & Recommendations:    Problem # 1:  Hx of hip replacement, right (ICD-V43.64) ([id number])    Orders:  Hip 1 View with pelvis - TC Only ([id number])  Post-op Visit (00881)      Medications Added to Medication List This Visit:  1)  Xarelto Tablet (Rivaroxaban tabs)            Patient Instructions:  1)  Please schedule a follow-up appointment in 6 weeks.             Electronically signed by Shara TREVINO on 06/20/2019 at 1:06 PM  ________________________________________________________________________       Disclaimer: Converted Note message may not contain all data elements that existed in the [employer] source system. Please see [employer] for the original note details.

## 2019-06-21 PROBLEM — I10 HYPERTENSION: Status: ACTIVE | Noted: 2019-01-01

## 2019-06-21 PROBLEM — Z13.820 SCREENING FOR OSTEOPOROSIS: Status: ACTIVE | Noted: 2017-06-07

## 2019-06-21 PROBLEM — E78.5 HYPERLIPIDEMIA: Status: ACTIVE | Noted: 2019-01-01

## 2019-06-21 PROBLEM — M21.619 BUNION: Status: ACTIVE | Noted: 2017-04-13

## 2019-06-21 PROBLEM — Z98.890 HISTORY OF LEFT HEART CATHETERIZATION (LHC): Status: ACTIVE | Noted: 2019-01-01

## 2019-06-21 PROBLEM — M25.551 PAIN IN RIGHT HIP: Status: ACTIVE | Noted: 2019-01-01

## 2019-06-21 PROBLEM — E66.9 OBESITY: Status: ACTIVE | Noted: 2017-03-09

## 2019-06-21 PROBLEM — M16.9 OSTEOARTHRITIS OF HIP: Status: ACTIVE | Noted: 2017-04-13

## 2019-06-21 PROBLEM — Z82.3 FAMILY HISTORY OF STROKE: Status: ACTIVE | Noted: 2019-01-01

## 2019-06-21 PROBLEM — M70.71 OTHER BURSITIS OF HIP, RIGHT HIP: Status: ACTIVE | Noted: 2017-04-13

## 2019-06-21 PROBLEM — M25.561 PAIN IN RIGHT KNEE: Status: ACTIVE | Noted: 2019-01-01

## 2019-06-21 PROBLEM — M81.0 OSTEOPOROSIS: Status: ACTIVE | Noted: 2017-06-07

## 2019-06-21 PROBLEM — G47.30 SLEEP APNEA: Status: ACTIVE | Noted: 2019-01-01

## 2019-06-21 PROBLEM — M15.9 GENERALIZED OSTEOARTHRITIS: Status: ACTIVE | Noted: 2017-03-09

## 2019-06-21 NOTE — PROGRESS NOTES
"  Subjective:           HPI:Arminda Womack is a 68 y.o. female who presents about 3 weeks out from having a right anterior total hip replacement.  She presents for a wound check.  She reports that a few days ago she noticed that the very top of her incision started to look a little red, and wanted to come in for a check.      Objective:    /76 (BP Location: Left arm, Patient Position: Sitting, Cuff Size: Adult)   Pulse 62   Ht 167.6 cm (66\")   Wt 102 kg (224 lb)   BMI 36.15 kg/m²     Physical Examination:      Alert, oriented, obese individual in no acute distress, ambulating with the assistance of a walker  Right lower extremity shows a well-healing surgical incision with no erythema or drainage. There is a very small area of superficial wound breakdown at the proximal portion of the incision with a very small amount of yellow slough, about 0.5x0.5cm. There is a minimal amount of swelling in the thigh area. It is grossly well aligned, and the patient is neurovascularly intact distally. Plantar and dorsiflexion is 5/5. There is mild tenderness to palpation and with range of motion, which is smooth.      Imaging:    None today      Assessment:       1. Hx of total hip arthroplasty, right    2. Obesity (BMI 30-39.9)      Wound concerns    Plan:      The area was cleansed thoroughly with chlorhexidine soap and saline solution and dressed with a bordered OPTi foam silver dressing.  This dressing should be left in place until the patient is seen back in 1 week.  She may shower with the dressing in place.  She should call with any questions or concerns.          URIEL Alexander  06/21/19  9:43 AM                      "

## 2019-06-28 NOTE — PROGRESS NOTES
"  Subjective:           HPI:Arminda Womack is a 68 y.o. female who presents about 4 weeks out from having a right anterior total hip replacement.  She presents for a wound check.       Objective:    /69 (BP Location: Left arm, Patient Position: Sitting, Cuff Size: Adult)   Pulse 68   Ht 167.6 cm (66\")   Wt 98 kg (216 lb)   BMI 34.86 kg/m²     Physical Examination:      Alert, oriented, obese individual in no acute distress, ambulating with the assistance of a walker  Right lower extremity shows a mostly well-healed surgical incision with no erythema or drainage. There is a very small area of superficial wound breakdown at the proximal portion of the incision with a very small amount of yellow slough, about 0.5x1cm. There is no appreciable swelling in the thigh area. It is grossly well aligned, and the patient is neurovascularly intact distally. Plantar and dorsiflexion is 5/5. There is mild tenderness to palpation and with range of motion, which is smooth.      Imaging:    None today      Assessment:       1. Hx of total hip arthroplasty, right    2. Disruption of external surgical wound, initial encounter    3. Obesity (BMI 30-39.9)          Plan:      The area was cleansed thoroughly with chlorhexidine soap and saline solution and dressed with a bordered OPTi foam silver dressing.  This dressing should be left in place until the patient is seen back in 2 weeks.  She may shower with the dressing in place.  She should call with any questions or concerns.          URIEL Alexander  06/28/19  11:22 AM                      "

## 2019-07-12 PROBLEM — Z96.641 HX OF TOTAL HIP ARTHROPLASTY, RIGHT: Status: ACTIVE | Noted: 2019-01-01

## 2019-07-12 PROBLEM — E66.9 OBESITY (BMI 30-39.9): Status: ACTIVE | Noted: 2019-01-01

## 2019-07-12 PROBLEM — T81.31XA DISRUPTION OF EXTERNAL SURGICAL WOUND: Status: ACTIVE | Noted: 2019-01-01

## 2019-07-12 NOTE — PATIENT INSTRUCTIONS
Preventing Health Risks of Being Overweight  Maintaining a healthy body weight is an important part of your overall health. Your healthy body weight depends on your age, gender, and height. Being overweight puts you at risk for many health problems, including:  · Heart disease.  · Diabetes.  · Problems sleeping.  · Joint problems.    You can make changes to your diet and lifestyle to prevent these risks. Consider working with a health care provider or a dietitian to make these changes.  What nutrition changes can be made?  · Eat only as much as your body needs. In most cases, this is about 2,000 calories a day, but the amount varies depending on your height, gender, and activity level. Ask your health care provider how many calories you should have each day. Eating more than your body needs on a regular basis can cause you to become overweight or obese.  · Eat slowly, and stop eating when you feel full.  · Choose healthy foods, including:  ? Fruits and vegetables.  ? Lean meats.  ? Low-fat dairy products.  ? High-fiber foods, such as whole grains and beans.  ? Healthy snacks like vegetable sticks, a piece of fruit, or a small amount of yogurt or cheese.  · Avoid foods and drinks that are high in sugar, salt (sodium), saturated fat, or trans fat. This includes:  ? Many desserts such as candy, cookies, and ice cream.  ? Soda.  ? Fried foods.  ? Processed meats such as hot dogs or lunch meats.  ? Prepackaged snack foods.  What lifestyle changes can be made?  · Exercise for at least 150 minutes a week to prevent weight gain, or as often as recommended by your health care provider. Do moderate-intensity exercise, such as brisk walking.  ? Spread it out by exercising for 30 minutes 5 days a week, or in short 10-minute bursts several times a day.  · Find other ways to stay active and burn calories, such as yard work or a hobby that involves physical activity.  · Get at least 8 hours of sleep each night. When you are  well-rested, you are more likely to be active and make healthy choices during the day. To sleep better:  ? Try to go to bed and wake up at about the same time every day.  ? Keep your bedroom dark, quiet, and cool.  ? Make sure that your bed is comfortable.  ? Avoid stimulating activities, such as watching television or exercising, for at least one hour before bedtime.  Why are these changes important?  Eating healthy and being active helps you lose weight and prevent health problems caused by being overweight. Making these changes can also help you manage stress, feel better mentally, and connect with friends and family.  What can happen if changes are not made?  Being overweight can affect you for your entire life. You may develop joint or bone problems that make it painful or difficult for you to play sports or do activities you enjoy. Being overweight puts stress on your heart and lungs and can lead to medical problems like diabetes, heart disease, and sleeping problems.  Where to find support  You can get support for preventing health risks of being overweight from:  · Your health care provider or a dietitian. They can provide guidance about healthy eating and healthy lifestyle choices.  · Weight loss support groups, online or in-person.    Where to find more information  · MyPlate: www.choosemyplate.gov  ? This an online tool that provides personalized recommendations about foods to eat each day.  · The Centers for Disease Control and Prevention: www.cdc.gov/healthyweight  ? This resource gives tips for managing weight and having an active lifestyle.  Summary  · To prevent unhealthy weight gain, it is important to maintain a healthy diet high in vegetables and whole grains, exercise regularly, and get at least 8 hours of sleep each night.  · Making these changes helps prevent many long-term (chronic) health conditions that can shorten your life, such as diabetes, heart disease, and stroke.  This information is  not intended to replace advice given to you by your health care provider. Make sure you discuss any questions you have with your health care provider.  Document Released: 11/14/2018 Document Revised: 11/14/2018 Document Reviewed: 11/14/2018  [employer] Interactive Patient Education © 2019 [employer]    Serving Sizes  A serving size is a measured amount of food or drink, such as one slice of bread, that has an associated nutrient content. Knowing the serving size of a food or drink can help you determine how much of that food you should consume.  What is the size of one serving?  The size of one healthy serving depends on the food or drink. To determine a serving size, read the food label. If the food or drink does not have a food label, try to find serving size information online. Or, use the following to estimate the size of one adult serving:  Grain  1 slice bread. ½ bagel. ½ cup pasta.  Vegetable  ½ cup cooked or canned vegetables. 1 cup raw, leafy greens.  Fruit  ½ cup canned fruit. 1 medium fruit. ¼ cup dried fruit.  Meat and Other Protein Sources  1 oz meat, poultry, or fish. ¼ cup cooked beans. 1 egg. ¼ cup nuts or seeds. 1 Tbsp nut butter. ¼ cup tofu or tempeh. 2 Tbsp hummus.  Dairy  An individual container of yogurt (6-8 oz). 1 piece of cheese the size of your thumb (1 oz). 1 cup (8 oz) milk or milk alternative.  Fat  A piece the size of one dice. 1 tsp soft margarine. 1 Tbsp mayonnaise. 1 tsp vegetable oil. 1 Tbsp regular salad dressing. 2 Tbsp low-fat salad dressing.  How many servings should I eat from each food group each day?  The following are the suggested number of servings to try and have every day from each food group. You can also look at your eating throughout the week and aim for meeting these requirements on most days for overall healthy eating.  Grain  6-8 servings. Try to have half of your grains from whole grains, such as whole wheat bread, corn tortillas, oatmeal, brown rice, whole wheat  pasta, and bulgur.  Vegetable  At least 2½-3 servings.  Fruit  2 servings.  Meat and Other Protein Foods  5-6 servings. Aim to have lean proteins, such as chicken, turkey, fish, beans, or tofu.  Dairy  3 servings. Choose low-fat or nonfat if you are trying to control your weight.  Fat  2-3 servings.  Is a serving the same thing as a portion?  No. A portion is the actual amount you eat, which may be more than one serving. Knowing the specific serving size of a food and the nutritional information that goes with it can help you make a healthy decision on what size portion to eat.  What are some tips to help me learn healthy serving sizes?  · Check food labels for serving sizes. Many foods that come as a single portion actually contain multiple servings.  · Determine the serving size of foods you commonly eat and figure out how large a portion you usually eat.  · Measure the number of servings that can be held by the bowls, glasses, cups, and plates you typically use. For example, pour your breakfast cereal into your regular bowl and then pour it into a measuring cup.  · For 1-2 days, measure the serving sizes of all the foods you eat.  · Practice estimating serving sizes and determining how big your portions should be.  This information is not intended to replace advice given to you by your health care provider. Make sure you discuss any questions you have with your health care provider.  Document Released: 09/15/2004 Document Revised: 08/12/2017 Document Reviewed: 03/17/2015  [employer] Interactive Patient Education © 2018 [employer]

## 2019-07-12 NOTE — PROGRESS NOTES
"  Subjective:           HPI:Arminda Womack is a 69 y.o. female who presents about 6 weeks out from having a right anterior total hip replacement.  She presents for a wound check.       Objective:    /72 (BP Location: Left arm, Patient Position: Sitting, Cuff Size: Adult)   Pulse 61   Ht 167.6 cm (66\")   Wt 98 kg (216 lb)   BMI 34.86 kg/m²     Physical Examination:      Alert, oriented, obese individual in no acute distress, ambulating with the assistance of a walker  Right lower extremity shows a mostly well-healed surgical incision with no erythema or drainage. There is a very small area of superficial wound breakdown at the proximal portion of the incision that is almost healed. There is no appreciable swelling in the thigh area. It is grossly well aligned, and the patient is neurovascularly intact distally. Plantar and dorsiflexion is 5/5. There is no tenderness to palpation or with range of motion, which is smooth.      Imaging:    None today      Assessment:       1. Hx of total hip arthroplasty, right    2. Disruption of external surgical wound, subsequent encounter    3. Obesity (BMI 30-39.9)        6 weeks out from surgery    Plan:      The area was cleansed thoroughly with chlorhexidine soap and saline solution and the patient was instructed to apply aquaphor daily.  She should call with any questions or concerns. She will see Dr. Kelly in 2 weeks.          URIEL Alexander  07/12/19  10:05 AM                      "

## 2019-07-13 PROBLEM — A41.9 SEVERE SEPSIS (HCC): Status: ACTIVE | Noted: 2019-01-01

## 2019-07-13 PROBLEM — J96.01 ACUTE HYPOXEMIC RESPIRATORY FAILURE (HCC): Status: ACTIVE | Noted: 2019-01-01

## 2019-07-13 PROBLEM — R65.20 SEVERE SEPSIS (HCC): Status: ACTIVE | Noted: 2019-01-01

## 2019-07-13 NOTE — H&P
History and Physical      Patient Care Team:  Rasta Spaulding MD as PCP - General (Family Medicine)  Ana Luisa Santillan MD as PCP - Claims Attributed    Chief complaint increasing shortness of breath, productive cough and hemoptysis    Subjective     Patient is a 69 y.o. female presents with the above complaints of increasing shortness of breath, productive cough and hemoptysis.  Patient had previously been in the hospital in the last month and underwent right hip arthroplasty.  She had rather uneventful postoperative course.  She was later seen by her family physician for increasing cough and shortness of breath approximately June 19.  She was started on levofloxacin at that time and given a 7-day course.  She started feeling a little better but then returned to the family practitioner's office for further problems breathing.  She has CAT scan done which ruled out underlying clot.  She was eventually found to have fluid in her lungs and was started last week on a steroid Dosepak.  She started feeling a little better until the day of her admission when she awoke with fever of about 101, she has sleep apnea so she also has a pulse oximeter at home and her oxygenation was down into the high 70s to low 80s.  Her cough was productive and eventually she started having hemoptysis.  She came to emergency department and had multiple underlying medical abnormal studies done including elevated BNP, she triggered the sepsis protocol, she had CT which was negative for pulmonary embolism but appeared that she had either recurrent /hospital-acquired pneumonia versus pleural effusions..  Symptoms are associated with fevers and chills, hemoptysis with productive cough and shortness of breath.  Symptoms are aggravated by activity.   Symptoms improve with rest, nebulizer treatments given in the emergency department. Severity moderate severe.    Review of Systems   All systems were reviewed and negative except for: No amanda  chest pain fortunately, no syncopal or near syncopal episodes.  Positives per history of present illness    History  Past Medical History:   Diagnosis Date   • Angina pectoris (CMS/HCC) 1/29/2015   • CPAP (continuous positive airway pressure) dependence    • History of left heart catheterization (LHC) 6/21/2019   • Hyperlipidemia    • Hypertension    • Osteoporosis 6/7/2017   • Sleep apnea    • Stable angina pectoris (CMS/HCC)    • Stenosis of left anterior descending (LAD) artery 1999    hx of small caliber LAD   Mitral valve prolapse  Status post right hip arthroplasty  Past Surgical History:   Procedure Laterality Date   • ATHRECTOMY ILIAC, FEMORAL, TIBIAL ARTERY Right 05/29/2019    -Dr Kelly-   • CATARACT EXTRACTION  2017   • CHOLECYSTECTOMY     • HIP SURGERY  05/29/2019    right hip   • KNEE ARTHROSCOPY Right    • MITRAL VALVE REPAIR/REPLACEMENT      mitral valve prolapse   • OTHER SURGICAL HISTORY      laparascopic surgery   • TOTAL ABDOMINAL HYSTERECTOMY     • TUBAL ABDOMINAL LIGATION       Family History   Problem Relation Age of Onset   • Hypertension Mother    • Stroke Mother    • Stroke Sister    • Heart disease Sister    • Cancer Sister    • Heart disease Maternal Aunt    • Diabetes Other      Social History     Tobacco Use   • Smoking status: Never Smoker   Substance Use Topics   • Alcohol use: Yes   • Drug use: No     Allergies:  Patient has no known allergies.    Objective     Vital Signs  Temp:  [98 °F (36.7 °C)-101 °F (38.3 °C)] 98 °F (36.7 °C)  Heart Rate:  [] 70  Resp:  [22-27] 27  BP: (101-183)/(59-98) 101/89   Pulse oximetry: On 10 L via nasal tent 94 to 96%      Physical Exam:      General Appearance:    Alert, cooperative, mild/moderate dyspnea at rest and with speaking, very pressured speech   Head:    Normocephalic, without obvious abnormality, atraumatic   Eyes:           Conjunctivae and sclerae normal, no   icterus, no pallor, corneas clear, PERRLA   Throat:   No oral lesions, no  thrush, oral mucosa moist   Neck:   No adenopathy, supple, trachea midline, no thyromegaly, no   carotid bruit, no JVD   Lungs:    Slight accessory muscle use to breathe, on 10 L via nasal tent, bilateral bases with few coarse crackles and wheezes apices clear    Heart:    Regular rhythm and normal rate, normal S1 and S2, no            murmur, no gallop, no rub, no click   Chest Wall:    No abnormalities observed   Abdomen:     Normal bowel sounds, no masses, no organomegaly, soft        non-tender, non-distended, no guarding, no rebound                tenderness   Rectal:     Deferred   Extremities:   Moves all extremities well, no edema, no cyanosis, no             redness   Pulses:   Pulses palpable and equal bilaterally   Skin:   No bleeding, bruising or rash   Lymph nodes:   No palpable adenopathy   Neurologic:   Cranial nerves 2 - 12 grossly intact, sensation intact, DTR       present and equal bilaterally       Labs:  Lab Results (last 24 hours)     ** No results found for the last 24 hours. **          Radiology:  Xr Chest 2 View    Result Date: 7/5/2019  Interval development of small bilateral pleural effusions and bibasilar opacities favored to represent atelectasis.  Electronically Signed By-Sawyer Oneil On:7/5/2019 1:48 PM This report was finalized on [id number] by  Sawyer Oneil, .    Ct Chest Pulmonary Embolism With Contrast    Result Date: 7/13/2019  1.  Extensive bilateral pulmonary airspace opacities most likely represent edema (cardiogenic versus noncardiogenic/ARDS). Pneumonia is difficult to exclude. 2.  Moderate bilateral pleural effusions and moderate pulmonary interstitial edema. 3.  No evidence of pulmonary embolus. 4.  Increasing mediastinal lymphadenopathy, possibly reactive or neoplastic. Recommend CT chest follow-up in 3 months. Electronically signed by:  Milagros Barker M.D.  7/13/2019 5:56 AM        Results Review:    I reviewed the patient's new clinical results.  I reviewed the  patient's new imaging results and agree with the interpretation.    Assessment/Plan     Respiratory failure with hypoxemia  Sepsis  Febrile illness  Hemoptysis  Possible healthcare facility acquired pneumonia/failed outpatient treatment  Elevated BNP  Pleural effusions  Mitral valve prolapse  Obstructive sleep apnea  Small caliber left anterior descending coronary artery  Status post right hip arthroplasty  Angina pectoris due to atherosclerotic heart disease of the native coronary arteries  Hypertension  Peripheral neuropathy  Chronic kidney disease stage III due to hypertensive nephrosclerosis        Start IV fluid resuscitation, start empiric antibiotic therapy, respiratory support, DVT prophylaxis, peptic ulcer disease prophylaxis, pulmonary toilet, electrolyte management, correct underlying metabolic abnormalities.  Consult pulmonology, infectious disease and patient's usual cardiologist.  Of note, patient was given a single dose of Lasix in the emergency department and is diuresing well.  However she was given a fluid bolus for triggering sepsis protocol.  Admit to PCU with continuous oxygen and telemetry monitoring.    Patient still not showing up on my list so orders are in but have not been honored.  I am unable to go through ADT on [employer] as a result, will recheck later so can order patient's home medications.    Expected Length of Stay 3-5 days    I discussed the patients findings and my recommendations with patient and nursing staff.     Lou Hanley,   07/13/19  10:19 AM

## 2019-07-14 PROBLEM — R04.89 DIFFUSE PULMONARY ALVEOLAR HEMORRHAGE: Status: ACTIVE | Noted: 2019-01-01

## 2019-07-14 NOTE — ANESTHESIA POSTPROCEDURE EVALUATION
Patient: Arminda Womack    Procedure Summary     Date:  07/14/19 Room / Location:  [facility] ENDOSCOPY 1 / [facility] ENDOSCOPY    Anesthesia Start:  0901 Anesthesia Stop:  0936    Procedure:  BRONCHOSCOPY with bronchoalveolar lavage (N/A Bronchus) Diagnosis:       Acute hypoxemic respiratory failure (CMS/HCC)      (Acute hypoxemic respiratory failure (CMS/HCC) [J96.01])    Surgeon:  Caitlyn Chavez MD Provider:  Jalyn Hancock MD    Anesthesia Type:  MAC ASA Status:  4          Anesthesia Type: MAC  Last vitals  BP   106/46 (07/14/19 1005)   Temp   98.3 °F (36.8 °C) (07/14/19 0627)   Pulse   70 (07/14/19 1005)   Resp   19 (07/14/19 0936)     SpO2   99 % (07/14/19 1005)     Post Anesthesia Care and Evaluation    Patient location during evaluation: PACU  Patient participation: complete - patient participated  Level of consciousness: awake  Pain scale: See nurse's notes for pain score.  Pain management: adequate  Airway patency: patent  Anesthetic complications: No anesthetic complications  PONV Status: none  Cardiovascular status: acceptable  Respiratory status: acceptable  Hydration status: acceptable    Comments: Patient seen and examined postoperatively; vital signs stable; SpO2 greater than or equal to 90%; cardiopulmonary status stable; nausea/vomiting adequately controlled; pain adequately controlled; no apparent anesthesia complications; patient discharged from anesthesia care when discharge criteria were met

## 2019-07-14 NOTE — ANESTHESIA PREPROCEDURE EVALUATION
Anesthesia Evaluation     Patient summary reviewed and Nursing notes reviewed   NPO Solid Status: > 8 hours             Airway   Mallampati: II  Dental - normal exam     Pulmonary    (+) sleep apnea on CPAP, rales,   Cardiovascular     Rhythm: regular  Rate: normal    (+) hypertension, CAD, angina, hyperlipidemia,       Neuro/Psych- negative ROS  GI/Hepatic/Renal/Endo    (+) obesity, morbid obesity,      Musculoskeletal     Abdominal   (+) obese,    Substance History      OB/GYN          Other   (+) arthritis                     Anesthesia Plan    ASA 4     MAC     Anesthetic plan, all risks, benefits, and alternatives have been provided, discussed and informed consent has been obtained with: patient.

## 2019-07-14 NOTE — PROGRESS NOTES
History and Physical      Patient Care Team:  Rasta Spaulding MD as PCP - General (Family Medicine)  Ana Luisa Santillan MD as PCP - Claims Attributed    Chief complaint increasing dyspnea on exertion, hemoptysis    Subjective     Patient is a 69 y.o. female presents with productive cough, increasing dyspnea on exertion, hemoptysis. Onset of symptoms was over the previous several weeks.  Patient says she is felt short of breath and had some dyspnea on exertion since her hip replacement towards the end of May.  She had seen primary care as an outpatient was given a course of Levaquin in middle of June.  She started feeling better and once again became short of breath.  She saw her primary care physician again about a week ago and was placed on a steroid pack which seemed to help but then eventually the patient morning of admission just became so short of breath she had a productive cough of chronic clear to yellow mucus and then she was coughing so hard she had some blood-tinged hemoptysis.  She came to the emergency room was subsequently admitted.  In the ER she was found to be profoundly hypoxic.  She was given Lasix, she also triggered the sepsis protocol.  Blood cultures were drawn and are pending at this dictation, urine was sent chest x-ray showed some nonspecific changes, CT of chest showed possible pneumonia versus pulmonary edema with pleural effusions.  Patient was subsequently admitted.  Patient also had a fever off and on prior to coming to the emergency department.    Review of Systems   All systems were reviewed and negative except for: See history of present illness.  Patient denies any amanda chest pain, no pain in her legs.  She does have a small area of her right hip surgical wound that is still slightly open and she is been treating this with Silvadene cream per her orthopedic surgeon.  She denies any pain around this area.    History  Past Medical History:   Diagnosis Date   • Angina  pectoris (CMS/HCC) 1/29/2015   • CPAP (continuous positive airway pressure) dependence    • History of left heart catheterization (LHC) 6/21/2019   • Hyperlipidemia    • Hypertension    • Osteoporosis 6/7/2017   • Sleep apnea    • Stable angina pectoris (CMS/HCC)    • Stenosis of left anterior descending (LAD) artery 1999    hx of small caliber LAD   Right hip arthroplasty  Mitral valve prolapse    Past Surgical History:   Procedure Laterality Date   • ATHRECTOMY ILIAC, FEMORAL, TIBIAL ARTERY Right 05/29/2019    -Dr Kelly-   • CATARACT EXTRACTION  2017   • CHOLECYSTECTOMY     • HIP SURGERY  05/29/2019    right hip   • KNEE ARTHROSCOPY Right    • MITRAL VALVE REPAIR/REPLACEMENT      mitral valve prolapse   • OTHER SURGICAL HISTORY      laparascopic surgery   • TOTAL ABDOMINAL HYSTERECTOMY     • TUBAL ABDOMINAL LIGATION       Family History   Problem Relation Age of Onset   • Hypertension Mother    • Stroke Mother    • Stroke Sister    • Heart disease Sister    • Cancer Sister    • Heart disease Maternal Aunt    • Diabetes Other      Social History     Tobacco Use   • Smoking status: Never Smoker   Substance Use Topics   • Alcohol use: Yes   • Drug use: No     Allergies:  Patient has no known allergies.    Objective     Vital Signs  Temp:  [97.5 °F (36.4 °C)-98.4 °F (36.9 °C)] 98.3 °F (36.8 °C)  Heart Rate:  [59-95] 84  Resp:  [16-27] 20  BP: (101-162)/(44-98) 145/57      Physical Exam:      General Appearance:    Alert, cooperative, mild/moderate respiratory distress with speaking and at rest, on 10 L via nasal tent   Head:    Normocephalic, without obvious abnormality, atraumatic   Eyes:           Conjunctivae and sclerae normal, no   icterus, no pallor, corneas clear, PERRLA   Throat:   No oral lesions, no thrush, oral mucosa moist   Neck:   No adenopathy, supple, trachea midline, no thyromegaly, no   carotid bruit, no JVD   Lungs:    Slight accessory muscle use.  Adventitial sounds are distant, positive rales with  some coarse wheezing at the bases bilaterally    Heart:    Regular rhythm and normal rate, normal S1 and S2, no            murmur, no gallop, no rub, no click   Chest Wall:    No abnormalities observed   Abdomen:     Normal bowel sounds, no masses, no organomegaly, soft        non-tender, non-distended, no guarding, no rebound                tenderness   Rectal:     Deferred   Extremities:   Moves all extremities well, no edema, no cyanosis, no             redness   Pulses:   Pulses palpable and equal bilaterally   Skin:   No bleeding, bruising or rash   Lymph nodes:   No palpable adenopathy   Neurologic:   Cranial nerves 2 - 12 grossly intact, sensation intact, DTR       present and equal bilaterally       Labs:   Lab Results (last 24 hours)     ** No results found for the last 24 hours. **        Lab Results (last 24 hours)     ** No results found for the last 24 hours. **          Radiology:  Ct Chest Pulmonary Embolism With Contrast    Result Date: 7/13/2019  1.  Extensive bilateral pulmonary airspace opacities most likely represent edema (cardiogenic versus noncardiogenic/ARDS). Pneumonia is difficult to exclude. 2.  Moderate bilateral pleural effusions and moderate pulmonary interstitial edema. 3.  No evidence of pulmonary embolus. 4.  Increasing mediastinal lymphadenopathy, possibly reactive or neoplastic. Recommend CT chest follow-up in 3 months. Electronically signed by:  Milagros Barker M.D.  7/13/2019 5:56 AM        Results Review:    I reviewed the patient's new clinical results.  I reviewed the patient's new imaging results and agree with the interpretation.    Assessment/Plan     Acute hypoxemic respiratory failure  Pulmonary edema  Hemoptysis  Possible underlying health care facility acquired pneumonia  Sleep apnea  Elevated BNP  Mitral valve prolapse  Small caliber LAD  Small surgical wound nonhealing right hip  Status post recent right hip arthroplasty      Patient has been admitted, after 4  attempts I finally got her admission orders in.  Patient is on for bronchoscopy.  Pulmonology and cardiology and infectious disease have been consulted as patient failed outpatient treatment.  Please refer the chart for her extensive orders.  Continue parenteral antibiotics/steroids, DVT prophylaxis, PUD prophylaxis, pulmonary toilet.  We will continue outpatient medications as well, monitor electrolytes and replace PRN.    Of note, this is a redictation of my H&P for some reason it did not go across yesterday.  Also, labs were reviewed there are results from 24 hours but I am unable to transfer these results into this note.  They were reviewed.    Expected Length of Stay 3-5 days    I discussed the patients findings and my recommendations with patient and nursing staff.     Lou Hanley,   07/14/19  7:08 AM

## 2019-08-01 NOTE — PROGRESS NOTES
"NEPHROLOGY PROGRESS NOTE    [facility]  [phone]  Edie Riley MD      Patient Care Team:  Rasta Spaulding MD as PCP - General (Family Medicine)  Khloe Spaulding MD as PCP - Claims Attributed      Provider:  Edie Riley MD  Patient Name: Arminda Womack  :  1950    SUBJECTIVE:  F/u BALDOMERO    Breathing OK still this AM but back on BiPap intermittently, but on high flow oxygen still. S/P HD ..  S/P 1st dose of IV Rituxan     Medication:    acetaminophen 650 mg Oral Once   ALPRAZolam 0.25 mg Oral BID   aspirin 81 mg Oral Every Other Day   bumetanide 1 mg Intravenous Q12H   cholecalciferol 500 Units Oral Daily   diphenhydrAMINE 12.5 mg Intravenous Once   guaiFENesin 600 mg Oral Q12H   insulin lispro 0-24 Units Subcutaneous 4x Daily With Meals & Nightly   ipratropium-albuterol 3 mL Nebulization 4x Daily - RT   methylPREDNISolone sodium succinate 125 mg Intravenous Q6H   pantoprazole 40 mg Intravenous Q AM   potassium chloride 20 mEq Oral Q12H   potassium chloride 40 mEq Oral Once   potassium chloride 40 mEq Oral Once   riTUXimab (RITUXAN) IVPB 760 mg Intravenous Q7 Days   sodium chloride 1,000 mL Intravenous Once   sodium chloride 10 mL Intravenous Q12H   sodium chloride 3 mL Intravenous Q12H   sulfamethoxazole-trimethoprim 1 tablet Oral Once per day on           OBJECTIVE    Vital Sign Min/Max for last 24 hours  Temp  Min: 97.3 °F (36.3 °C)  Max: 98.5 °F (36.9 °C)   BP  Min: 113/49  Max: 178/82   Pulse  Min: 103  Max: 139   Resp  Min: 22  Max: 29   SpO2  Min: 81 %  Max: 98 %   No Data Recorded   Weight  Min: 91.6 kg (201 lb 15.1 oz)  Max: 91.6 kg (201 lb 15.1 oz)     Flowsheet Rows      First Filed Value   Admission Height  167.6 cm (66\") Documented at 2019 0545   Admission Weight  98.8 kg (217 lb 13 oz) Documented at 2019 0545          No intake/output data recorded.  I/O last 3 completed shifts:  In: 1799 [P.O.:300; I.V.:1399; IV " Piggyback:100]  Out: 1780 [Urine:1780]    Physical Exam:  General Appearance: alert, appears stated age and cooperative  Head: normocephalic, without obvious abnormality and atraumatic  Eyes: conjunctivae and sclerae normal and no icterus  Neck: supple +MILD JVD  Lungs:+BIBASILAR RALES respirations regular +FEW SCATTERED RHONCHI  Heart: regular rhythm & normal rate and normal S1, S2 +ALBERTINA  Chest: Wall no abnormalities observed  Abdomen: normal bowel sounds and soft non-tender  Extremities: moves extremities well, +TRACE PRETIBIAL EDEMA BILAT, no cyanosis and no redness  Skin: no bleeding, bruising or rash, turgor normal, color normal and no leasions noted  Neurologic: Alert, and oriented. No focal deficits    Labs:    WBC WBC   Date Value Ref Range Status   08/01/2019 17.50 (H) 3.40 - 10.80 10*3/mm3 Final   07/31/2019 16.60 (H) 3.40 - 10.80 10*3/mm3 Final   07/30/2019 25.40 (H) 3.40 - 10.80 10*3/mm3 Final      HGB Hemoglobin   Date Value Ref Range Status   08/01/2019 8.1 (L) 12.0 - 15.9 g/dL Final   07/31/2019 8.3 (L) 12.0 - 15.9 g/dL Final   07/30/2019 9.3 (L) 12.0 - 17.0 g/dL Final   07/30/2019 9.6 (L) 12.0 - 15.9 g/dL Final      HCT Hematocrit   Date Value Ref Range Status   08/01/2019 24.9 (L) 34.0 - 46.6 % Final   07/31/2019 25.2 (L) 34.0 - 46.6 % Final   07/30/2019 27 (L) 38 - 51 % Final   07/30/2019 29.5 (L) 34.0 - 46.6 % Final      Platlets No results found for: LABPLAT   MCV MCV   Date Value Ref Range Status   08/01/2019 87.3 79.0 - 97.0 fL Final   07/31/2019 86.9 79.0 - 97.0 fL Final   07/30/2019 87.1 79.0 - 97.0 fL Final          Sodium Sodium   Date Value Ref Range Status   08/01/2019 137 136 - 144 mmol/L Final   07/31/2019 140 136 - 144 mmol/L Final   07/30/2019 134 (L) 136 - 144 mmol/L Final      Potassium Potassium   Date Value Ref Range Status   08/01/2019 3.9 3.6 - 5.1 mmol/L Final   07/31/2019 3.9 3.6 - 5.1 mmol/L Final   07/30/2019 3.7 3.6 - 5.1 mmol/L Final      Chloride Chloride   Date Value Ref  Range Status   08/01/2019 100 (L) 101 - 111 mmol/L Final   07/31/2019 101 101 - 111 mmol/L Final   07/30/2019 96 (L) 101 - 111 mmol/L Final      CO2 CO2   Date Value Ref Range Status   08/01/2019 25.0 22.0 - 32.0 mmol/L Final   07/31/2019 26.0 22.0 - 32.0 mmol/L Final   07/30/2019 26.0 22.0 - 32.0 mmol/L Final      BUN BUN   Date Value Ref Range Status   08/01/2019 43 (H) 8 - 20 mg/dL Final   07/31/2019 33 (H) 8 - 20 mg/dL Final   07/30/2019 52 (H) 8 - 20 mg/dL Final      Creatinine Creatinine   Date Value Ref Range Status   08/01/2019 1.70 (H) 0.40 - 1.00 mg/dL Final   07/31/2019 1.50 (H) 0.40 - 1.00 mg/dL Final   07/30/2019 2.00 (H) 0.40 - 1.00 mg/dL Final      Calcium Calcium   Date Value Ref Range Status   08/01/2019 8.5 (L) 8.9 - 10.3 mg/dL Final   07/31/2019 8.2 (L) 8.9 - 10.3 mg/dL Final   07/30/2019 8.3 (L) 8.9 - 10.3 mg/dL Final      PO4 No components found for: PO4   Albumin Albumin   Date Value Ref Range Status   08/01/2019 3.20 (L) 3.50 - 4.80 g/dL Final   07/31/2019 3.30 (L) 3.50 - 4.80 g/dL Final   07/30/2019 3.80 3.50 - 4.80 g/dL Final      Magnesium Magnesium   Date Value Ref Range Status   08/01/2019 2.0 1.8 - 2.5 mg/dL Final   07/31/2019 1.7 (L) 1.8 - 2.5 mg/dL Final   07/30/2019 2.0 1.8 - 2.5 mg/dL Final      Uric Acid No components found for: URIC ACID     Imaging Results (last 72 hours)     Procedure Component Value Units Date/Time    XR Chest 1 View [[id number]] Updated:  07/20/19 0611    US Renal Bilateral [[id number]] Collected:  07/19/19 2010     Updated:  07/19/19 2014    Narrative:       Examination: US RENAL BILATERAL-     Date of Exam: 7/19/2019 6:38 PM     Indication: Acute renal failure.     Comparison: 5/31/2019.     Technique: Grayscale and color Doppler ultrasound evaluation of the  kidneys and urinary bladder was performed     Findings:  The right kidney measures 11.1 x 5.0 x 4.8 cm and the left kidney  measures 11.5 x 5.3 x 5.7 cm.The cortical thickness and echogenicity is  normal.  There is no renal mass, calculus, or hydronephrosis.The  visualized urinary bladder is unremarkable. The estimated urinary  bladder volume is 189 cc. There is an incidental small left pleural  effusion.          Impression:          1. Both kidneys are normal.  2. The urinary bladder is unremarkable.  3. Incidental small left pleural effusion.     Electronically Signed By-Rasta Ashton On:7/19/2019 8:12 PM  This report was finalized on [id number] by  Rasta Ashton, .    IR insert non-tunneled CVC 5+ [[id number]] Collected:  07/19/19 1418     Updated:  07/19/19 1424    Narrative:       DATE OF EXAM:  7/19/2019 1:08 PM     PROCEDURE:  IR INSERT NON-TUNNELED CVC 5+-, US GUIDED VASCULAR ACCESS-     INDICATIONS:  plasmaphorsesis; A41.9-Sepsis, unspecified organism; R65.20-Severe  sepsis without septic shock; J81.0-Acute pulmonary edema; J96.01-Acute  respiratory failure with hypoxia     COMPARISON:  No Comparisons Available     FLUOROSCOPIC TIME:  None     PHYSICIAN MONITORED CONSCIOUS SEDATION TIME:  None     CONTRAST MEDIA:  None     TECHNIQUE:   The patient's medications were reviewed prior to the procedure. The  procedure, risks and alternatives were discussed and informed written  consent was obtained. Maximal sterile barrier technique was utilized  throughout the procedure. The procedure was performed in the ICU. The  patient voiced understanding and consent for the procedure. The skin was  prepped and draped over the right internal jugular vein. Ultrasound was  utilized to confirm IJ patency. Utilizing maximal sterile barrier  technique and under local anesthesia a micropuncture was performed of  the right internal jugular vein under ultrasound guidance. A wire was  inserted followed by placement of a micropuncture set. An 035 wire was  inserted, serial dilatation performed, and a 12 [place] Shiley catheter  placed with the tip at the cavoatrial junction. The line was sutured to  the skin utilizing 2-0 Prolene suture.  The catheter was flushed with  heparinized saline. Portable chest was obtained documenting placement.       Impression:       Technically successful placement of a right-sided Shiley dialysis  catheter tip at the cavoatrial junction.     Electronically Signed By-Parrish Montes De Oca On:7/19/2019 2:22 PM  This report was finalized on [id number] by  Parrish Montes De Oca, .    US Guided Vascular Access [[id number]] Collected:  07/19/19 1418     Updated:  07/19/19 1424    Narrative:       DATE OF EXAM:  7/19/2019 1:08 PM     PROCEDURE:  IR INSERT NON-TUNNELED CVC 5+-, US GUIDED VASCULAR ACCESS-     INDICATIONS:  plasmaphorsesis; A41.9-Sepsis, unspecified organism; R65.20-Severe  sepsis without septic shock; J81.0-Acute pulmonary edema; J96.01-Acute  respiratory failure with hypoxia     COMPARISON:  No Comparisons Available     FLUOROSCOPIC TIME:  None     PHYSICIAN MONITORED CONSCIOUS SEDATION TIME:  None     CONTRAST MEDIA:  None     TECHNIQUE:   The patient's medications were reviewed prior to the procedure. The  procedure, risks and alternatives were discussed and informed written  consent was obtained. Maximal sterile barrier technique was utilized  throughout the procedure. The procedure was performed in the ICU. The  patient voiced understanding and consent for the procedure. The skin was  prepped and draped over the right internal jugular vein. Ultrasound was  utilized to confirm IJ patency. Utilizing maximal sterile barrier  technique and under local anesthesia a micropuncture was performed of  the right internal jugular vein under ultrasound guidance. A wire was  inserted followed by placement of a micropuncture set. An 035 wire was  inserted, serial dilatation performed, and a 12 Russian Shiley catheter  placed with the tip at the cavoatrial junction. The line was sutured to  the skin utilizing 2-0 Prolene suture. The catheter was flushed with  heparinized saline. Portable chest was obtained documenting placement.        Impression:       Technically successful placement of a right-sided Shiley dialysis  catheter tip at the cavoatrial junction.     Electronically Signed By-Parrish Montes De Oca On:7/19/2019 2:22 PM  This report was finalized on [id number] by  Parrish Montes De Oca, .    XR Chest 1 View [[id number]] Collected:  07/19/19 1412     Updated:  07/19/19 1414    Narrative:       DATE OF EXAM:  7/19/2019 2:04 PM     PROCEDURE:  XR CHEST 1 VW-     INDICATIONS:  post shiley; A41.9-Sepsis, unspecified organism; R65.20-Severe sepsis  without septic shock; J81.0-Acute pulmonary edema; J96.01-Acute  respiratory failure with hypoxia     COMPARISON:  07/19/2019 earlier the same day     TECHNIQUE:   Single radiographic AP view of the chest was obtained.     FINDINGS:  There has been interval placement of a right internal jugular dialysis  catheter with tip in the superior vena cava. There is no pneumothorax.  There is a right PICC line with tip in the superior vena cava. Again  seen are diffuse bilateral alveolar and interstitial opacities which  could be from multifocal pneumonia or pulmonary edema.        Impression:       New right internal jugular temporary dialysis catheter properly  positioned with the tip in the superior vena cava. There is no  pneumothorax. Diffuse infiltrates persist.     Electronically Signed By-Dawit Woodson On:7/19/2019 2:12 PM  This report was finalized on [id number] by  Dawit Woodson, .    XR Chest 1 View [[id number]] Collected:  07/19/19 0725     Updated:  07/19/19 0729    Narrative:       Examination: XR CHEST 1 VW-     Date of Exam: 7/19/2019 3:25 AM     Indication: Respiratory failure; A41.9-Sepsis, unspecified organism;  R65.20-Severe sepsis without septic shock; J81.0-Acute pulmonary edema;  J96.01-Acute respiratory failure with hypoxia.     Comparison: 07/18/2019     Technique: 1 view of the chest      Findings:  A right-sided PICC has its tip at the cavoatrial junction. Patchy  bilateral airspace consolidations  have slightly worsened on the right  side. The heart and mediastinal contours are likely stable. The osseous  structures appear intact.       Impression:       Patchy bilateral airspace consolidations have slightly worsened on the  right side, which may represent pulmonary edema, pneumonia, or ARDS.     Electronically Signed By-DR. Karthik Hernandez MD On:7/19/2019 7:27 AM  This report was finalized on [id number] by DR. Karthik Hernandez MD.    XR Chest 1 View [[id number]] Collected:  07/18/19 1752     Updated:  07/18/19 1756    Narrative:       DATE OF EXAM:  7/18/2019 5:42 PM     PROCEDURE:  XR CHEST 1 VW-     INDICATIONS:  Sepsis, hemoptysis.      COMPARISON:  07/08/2019 at 1208 hours     TECHNIQUE:   Single radiographic view of the chest was obtained.     FINDINGS:  There is bilateral airspace disease with air bronchograms with slight  interval worsening on the left side. This can be seen with pulmonary  edema, pneumonia, ARDS, or pulmonary hemorrhage. There are no moderate  to large pleural effusions. The heart size is difficult to assess as the  cardiac borders are obscured. There is a stable right upper extremity  PICC line in place with the tip terminating in the proximal right  atrium.  There is spondylosis within the thoracic spine.       Impression:       Bilateral airspace disease with slight interval worsening on the left  side. Diagnostic considerations are discussed above.     Electronically Signed By-Rasta Ashton On:7/18/2019 5:53 PM  This report was finalized on [id number] by  Rasta Ashton, .    XR Chest 1 View [[id number]] Collected:  07/18/19 0717     Updated:  07/18/19 0720    Narrative:       XR CHEST 1 VW-     Date of Exam: 7/18/2019 12:06 AM     Indication: Respiratory failure; A41.9-Sepsis, unspecified organism;  R65.20-Severe sepsis without septic shock; J81.0-Acute pulmonary edema;  J96.01-Acute respiratory failure with hypoxia.     Comparison: 7/17/2019     Technique: A single view of the  chest was obtained.     FINDINGS: Cardiomediastinal is stable. Right-sided PICC line is  unchanged. There is worsening bilateral perihilar airspace disease,  right greater than left. No pleural effusion is seen. There is no  evidence of pneumothorax.        Impression:             1. Worsening bilateral perihilar airspace disease.  2. Stable cardiomegaly.        Electronically Signed By-Fabian Corcoran On:7/18/2019 7:18 AM  This report was finalized on [id number] by  Fabian Corcoran, .          Results for orders placed during the hospital encounter of 07/13/19   XR Chest 1 View    Narrative DATE OF EXAM:  7/30/2019 7:59 AM     PROCEDURE:  XR CHEST 1 VW-     INDICATIONS:  fast call; A41.9-Sepsis, unspecified organism; R65.20-Severe sepsis  without septic shock; J81.0-Acute pulmonary edema; J96.01-Acute  respiratory failure with hypoxia       COMPARISON:  AP portable chest 07/30/2019 at 5:30 AM.     TECHNIQUE:   Single radiographic view of the chest was obtained.     FINDINGS:  Diffuse interstitial and alveolar disease within both lungs, greatest in  a bibasilar distribution, unchanged since today's earlier study.  Suspected small layering bilateral pleural effusions, unchanged. Heart  size is stable. The right IJ central line extends to the upper right  atrial level. Right arm approach PICC extends to the SVC level. No  visible pneumothorax. No acute osseous abnormalities.       Impression Diffuse interstitial and alveolar disease is stable compared to today's  earlier exam,, and may represent edema, pneumonia, or ARDS.     Stable small layering bilateral pleural effusions.     Electronically Signed By-Dr. Carol Almeida MD On:7/30/2019 8:26 AM  This report was finalized on [id number] by Dr. Carol Almeida MD.   XR Chest 1 View    Narrative EXAMINATION: XR CHEST 1 VW      DATE: 7/30/2019 5:28 AM    INDICATIONS: Respiratory failure. Hypertension. Recent cardiac catheterization.    COMPARISON: July 26, 2019 at  7:19 AM    FINDINGS:     Right internal jugular central venous catheter in good position, stable. Right upper extremity PICC line tip in good position, stable. Dense hazy opacity is again noted throughout both lungs. The lateral costophrenic angles are indistinct. Stable heart   and mediastinum. Stable osseous structures. Telemetry leads.      Impression 1. Satisfactory placement of the tubes and lines.  2. Stable severe bilateral pulmonary opacity compatible with ARDS.  3. Posteriorly layering pleural effusions may have increased somewhat in size over the past several days. Increasing indistinctness of the lateral costophrenic angles is seen.    Electronically signed by:  Parrish Will M.D.    7/30/2019 3:59 AM   XR Chest 1 View    Narrative DATE OF EXAM:  7/28/2019 11:36 AM     PROCEDURE:  XR CHEST 1 VW-     INDICATIONS:  Sepsis, shortness of breath, pulmonary edema.      COMPARISON:  7/26/2019.     TECHNIQUE:   Single radiographic view of the chest was obtained.     FINDINGS:  The heart is enlarged. There is bilateral mixed interstitial/airspace  disease which may be slightly improved aeration. This can be seen with  multifocal pneumonia or pulmonary edema. The right pleural space is  probably clear. There is a definite small left pleural effusion. The  right upper extremity PICC line and right internal jugular line remain  stable.  There is degenerative spondylosis of the thoracic spine.       Impression    1. Slight improved aeration of the lungs. There is still extensive mixed  interstitial/airspace disease.  2. Small stable left pleural effusion.     Electronically Signed By-Rasta Ashton On:7/28/2019 11:56 AM  This report was finalized on [id number] by  Rasta Ashton, .            ASSESSMENT/PLAN      Acute hypoxemic respiratory failure (CMS/HCC)    Severe sepsis (CMS/HCC)    Diffuse pulmonary alveolar hemorrhage    1. ARF/BALDOMERO----- BUN/Cr down post HD 7/30. Will do HD again primarily for volume reasons  +Recurrent ARF/BALDOMERO that appears to be secondary to decompensated cardiopulmonary state (cardiorenal) vs. ANCA associated GN. Not hemodynamically stable for biopsy.  No NSAIDs or IV dye. Being treated for empiric ANCA Vasculitis. PEX # 7 of 7 done. Has been on Cytoxan and Prednisone, but due to relapse of alveolar hemorrhage cytoxan was changed to Rituxan 7/30. Spoke to family, patient, and , Pulmonary. S/P HD 7/30.     2. VOLUME OVERLOAD------UF with HD.      3. ANCA + WITH PULMONARY ALVEOLAR HEMORRHAGE/HYPOXIA-------S/P Empiric pulse dose steroids, PEX, and oral Cytoxan. Change oral Cytoxan to Rituxan. Continue SS Bactrim MWF for PCP prophylaxis.   No Hydralazine b/c of +ANCA.      4. HTN---------No Hydralazine given +ANCA     5. S/P RECENT HIP SURGERY     6. OA/DJD------No NSAIDs.       7. ACIDOSIS------Better     8. HYPOCALCEMIA------Replaced. Follow ionized levels     9. OBESITY/DELLA    10. HYPOKALEMIA------Replaced    11. HYPERNATREMIA-----Better post HD    12. HYPOALBUMINEMIA----- IV Albumin to temporize    13. ANEMIA-------PRBCs as needed.  Hem/Onc following.     14. HYPOMAGNESEMIA------Replaced    15. HYPOCALCEMIA-----Replaced      Edie Riley MD  [facility]  [phone]  08/01/19  8:10 AM

## 2019-08-01 NOTE — THERAPY TREATMENT NOTE
Acute Care - Physical Therapy Treatment Note   [place]     Patient Name: Arminda Womack  : 1950  MRN: [id number]  Today's Date: 2019             Admit Date: 2019    Visit Dx:    ICD-10-CM ICD-9-CM   1. Severe sepsis (CMS/HCC) A41.9 038.9    R65.20 995.92   2. Acute pulmonary edema (CMS/HCC) J81.0 518.4   3. Acute hypoxemic respiratory failure (CMS/HCC) J96.01 518.81     Patient Active Problem List   Diagnosis   • Angina pectoris (CMS/HCC)   • Bunion   • Family history of stroke   • Generalized osteoarthritis   • History of left heart catheterization (LHC)   • Hyperlipidemia   • Hypertension   • Obesity   • Osteoarthritis of hip   • Sleep apnea   • Screening for osteoporosis   • Pain in right knee   • Pain in right hip   • Other bursitis of hip, right hip   • Osteoporosis   • Hx of total hip arthroplasty, right   • Disruption of external surgical wound   • Obesity (BMI 30-39.9)   • Severe sepsis (CMS/HCC)   • Acute hypoxemic respiratory failure (CMS/HCC)   • Diffuse pulmonary alveolar hemorrhage       Therapy Treatment    Rehabilitation Treatment Summary     Row Name 19 1006             Treatment Time/Intention    Document Type  therapy note (daily note)  -SS      Patient/Family Observations  avaps  -SS      Therapy Frequency (PT Clinical Impression)  daily  -SS      Treatment Considerations/Comments  conservative progression due to medical complexity  -SS      Recorded by [SS] Angie Mckeon, PT 19 1015      Row Name 19 1006             Vital Signs    Pretreatment Heart Rate (beats/min)  127  -SS      Pre SpO2 (%)  95  -SS      O2 Delivery Pre Treatment  -- avaps  -SS      Recorded by [SS] Angie Mckeon, PT 19 1015      Row Name 19 1006             Cognitive Assessment/Intervention- PT/OT    Orientation Status (Cognition)  oriented x 4  -SS      Recorded by [SS] Angie Mckeon, PT 19 1015      Row Name 19 1006             Bed Mobility  Assessment/Treatment    Comment (Bed Mobility)  not appropriate this date  -SS      Recorded by [SS] Angie Mckeon, PT 08/01/19 1114      Row Name 08/01/19 1006             Therapeutic Exercise    Comment (Therapeutic Exercise)  supine ankle pumps, quad sets, glutes sets, heel slides  -SS      Recorded by [SS] Angie Mckeon, PT 08/01/19 1114      Row Name 08/01/19 1006             Positioning and Restraints    Pre-Treatment Position  in bed  -SS      Post Treatment Position  bed  -SS      In Bed  notified nsg;with family/caregiver;call light within reach;encouraged to call for assist  -SS      Recorded by [SS] Angie Mckeon, PT 08/01/19 1114      Row Name                Wound 07/13/19 0944 Right anterior greater trochanter surgical;incision    Wound - Properties Group Date first assessed: 07/13/19 [SF] Time first assessed: 0944 [SF] Present On Admission : yes [SF] Side: Right [SF] Orientation: anterior [SF] Location: greater trochanter [SF] Type: surgical;incision [SF] Additional Comments: Partially healed surgical incision [SF] Recorded by:  [SF] Marguerite Peter RN 07/13/19 0946    Row Name                Wound 07/21/19 1900 Bilateral midline nose pressure injury    Wound - Properties Group Date first assessed: 07/21/19 [MG] Time first assessed: 1900 [MG] Present On Admission : no [MG] Side: Bilateral [MG] Orientation: midline [MG] Location: nose [MG] Type: pressure injury [MG] Stage, Pressure Injury: deep tissue injury [MG] Recorded by:  [MG] Joselin Ramirez RN 07/22/19 0650    Row Name 08/01/19 1006             Outcome Summary/Treatment Plan (PT)    Daily Summary of Progress (PT)  progress towards functional goals is fair  -SS      Barriers to Overall Progress (PT)  O2 status  -SS      Plan for Continued Treatment (PT)  daily with conservative progress  -SS      Anticipated Discharge Disposition (PT)  inpatient rehabilitation facility  -SS      Recorded by [SS] Angie Mckeon, PT  08/01/19 1114        User Key  (r) = Recorded By, (t) = Taken By, (c) = Cosigned By    Initials Name Effective Dates Discipline    SS Angie Mckeon, PT 06/19/19 -  PT    Marguerite Joiner RN 03/01/19 -  Nurse    MG Joselin Ramirez RN 03/04/19 -  Nurse          Wound 07/13/19 0944 Right anterior greater trochanter surgical;incision (Active)   Dressing Appearance open to air 8/1/2019  8:30 AM   Closure Open to air 8/1/2019  8:30 AM   Periwound intact 8/1/2019  8:30 AM   Drainage Amount none 8/1/2019  8:30 AM       Wound 07/21/19 1900 Bilateral midline nose pressure injury (Active)   Dressing Appearance open to air 8/1/2019  8:30 AM   Base purple;red;moist 8/1/2019  8:30 AM           Physical Therapy Education     Title: PT OT SLP Therapies (Done)     Topic: Physical Therapy (Done)     Point: Mobility training (Done)     Learning Progress Summary           Patient Acceptance, E, VU by KERRI at 8/1/2019 11:52 AM    Acceptance, E, VU by  at 8/1/2019  6:53 AM    Acceptance, E, VU by SS at 7/31/2019  5:16 PM    Acceptance, E, VU by SM at 7/29/2019 10:07 AM    Acceptance, E,TB, VU,DU,NR by MC at 7/27/2019  4:56 PM    Acceptance, E, VU by SC at 7/26/2019  1:28 PM    Acceptance, E, VU by SS at 7/25/2019  2:55 PM    Acceptance, E,TB, VU by AO at 7/24/2019 11:25 AM    Comment:  Educated patient on seated/standing LE exercises, on safe transfer techinques with RW, emphasising proper hand/foot placement, and gait training with RW.    Acceptance, E, VU by AB at 7/23/2019 12:02 PM    Acceptance, E,TB, VU by KEVIN at 7/21/2019  3:12 AM    Acceptance, E, VU,Bed IU by KEVIN at 7/20/2019  4:09 AM    Acceptance, E,TB, VU,DU by CM at 7/19/2019 12:13 PM    Comment:  discussed performing exercises as tolerated    Acceptance, E,TB,D, VU by JR at 7/18/2019 11:24 AM    Acceptance, E,TB, VU by LOULOU at 7/17/2019  3:04 PM    Acceptance, E, VU by SS at 7/16/2019  2:30 PM   Family Acceptance, E, VU by MH at 8/1/2019  6:53 AM    Acceptance, E,TB,  VU,DU,NR by  at 7/27/2019  4:56 PM    Acceptance, E, VU by SC at 7/26/2019  1:28 PM   Significant Other Acceptance, E,TB, VU by  at 7/21/2019  3:12 AM    Acceptance, E, VU,Bed IU by  at 7/20/2019  4:09 AM    Acceptance, E,TB, VU,DU by  at 7/19/2019 12:13 PM    Comment:  discussed performing exercises as tolerated    Acceptance, E,TB,D, VU by  at 7/18/2019 11:24 AM                               User Key     Initials Effective Dates Name Provider Type Discipline     06/19/19 -  Angie Mckeon, PT Physical Therapist PT    CM 03/01/19 -  Carmen Hauser, PT Physical Therapist PT     03/01/19 -  Marah Stein, PT Physical Therapist PT    SC 03/01/19 -  Marguerite Mena, PTA Physical Therapy Assistant PT     03/01/19 -  Shawanda Bragg, PTA Physical Therapy Assistant PT     03/01/19 -  Marguerite Alvarado, PTA Physical Therapy Assistant PT     03/01/19 -  Carmen Lopez, RN Registered Nurse Nurse     03/01/19 -  May Mcnally, RN Registered Nurse Nurse     06/15/19 -  Leatha Lees, PT Physical Therapist PT    AB 06/01/19 -  Deb Newell, LYNNETTE Student PT Student PT    JR 06/10/19 -  Negar Dubon, PT Physical Therapist PT                PT Recommendation and Plan  Anticipated Discharge Disposition (PT): inpatient rehabilitation facility  Planned Therapy Interventions (PT Eval): balance training, bed mobility training, gait training, home exercise program, patient/family education, strengthening, stretching, transfer training  Therapy Frequency (PT Clinical Impression): daily  Outcome Summary/Treatment Plan (PT)  Daily Summary of Progress (PT): progress towards functional goals is fair  Barriers to Overall Progress (PT): O2 status  Plan for Continued Treatment (PT): daily with conservative progress  Anticipated Discharge Disposition (PT): inpatient rehabilitation facility  Plan of Care Reviewed With: patient  Outcome Summary: Pt with decreased tolerance to activity this date. Pt on  AVAPS with increased work of breathing with bed exercises. Determined not appropriate for sitting edge of bed this date. Will continue to follow daily for PT while IP at [facility].      Time Calculation:   PT Charges     Row Name 08/01/19 1152             Time Calculation    Start Time  0955  -SS      Stop Time  1020  -SS      Time Calculation (min)  25 min  -SS      PT Received On  08/01/19  -      PT - Next Appointment  08/02/19  -        User Key  (r) = Recorded By, (t) = Taken By, (c) = Cosigned By    Initials Name Provider Type     Angie Mckeon, PT Physical Therapist        Therapy Charges for Today     Code Description Service Date Service Provider Modifiers Qty    13041777809 HC PT RE-EVAL ESTABLISHED PLAN 2 7/31/2019 Angie Mckeon, PT GP 1    24384499104 HC PT THER PROC EA 15 MIN 7/31/2019 Angie Mckeon, PT GP 1    48927438200 HC PT THERAPEUTIC ACT EA 15 MIN 7/31/2019 Angie Mckeon, PT GP 1    91143192820 HC PT THER PROC EA 15 MIN 8/1/2019 Angie Mckeon, PT GP 1    [id number]  PT SELF CARE/MGMT/TRAIN EA 15 MIN 8/1/2019 Angie Mckeon, PT GP 1               Angie Mckeon PT  8/1/2019

## 2019-08-01 NOTE — SIGNIFICANT NOTE
08/01/19 1700   Coping/Psychosocial   Observed Emotional State cooperative   Verbalized Emotional State sadness;grief;hopefulness   Plan of Care Reviewed With patient;spouse;family   Additional Documentation Pastoral/Spiritual Care (Group)   Psychosocial Support   Trust Relationship/Rapport care explained;emotional support provided;empathic listening provided;thoughts/feelings acknowledged;questions answered   Family/Support System Care presence promoted;support provided   Involvement in Care   Family/Support System, Persons family;spouse   Involvement in Care at bedside;attentive to patient;interacting with patient;supportive of patient   Pastoral/Spiritual Care   Pastoral Care Visit Type follow-up   Pastoral Care Source family request   Receptivity to Spiritual Care visit welcomed   Pastoral Care Request advocacy support;decision-making support;end-of-life issue(s) support;family support;loss support;prayer support;spiritual/moral support   Pastoral Care Interventions theological discussion provided;supportive conversation provided;prayer support provided;end-of-life care assistance provided;decision-making facilitated;advocacy support provided   Pastoral Care Response emotion expressed;engaged in conversation;receptive of support;thanks expressed;visit helpful   Use of Spiritual Resources spirituality for coping, indicated strong use of;prayer   Pastoral Care Follow-Up will follow closely   Coping/Psychosocial Interventions   Supportive Measures active listening utilized;decision-making supported   Environmental Support calm environment promoted;distractions minimized   Daily Care   Activity Management other (see comments);patient refuses activity     Received call from  (Doc) that PT wants to stop treatment and pass.  reports that he doesn't fully agree with PT, but supports her decision making.  says that PT no longer wants to be kept alive by machines. Reported to nurse (Deb) about  phone conversation. Arrived to unit nurse (Deb) and  both enter room and spoke to PT about decision making. PT reports she no longer wants to be in pain and is tired of the mask being on her face. PT understands that without oxygen she will pass shortly, but still wants to go through with her decisions. PT reports she is Yazidi and believes that she has hope after passing. Is at peace with God and looks forward with hope. Prayed with PT and family at bedside. Other family arrived at bedside for support.

## 2019-08-01 NOTE — PROGRESS NOTES
Hematology/Oncology Inpatient Progress Note    PATIENT NAME: Arminda Womack  : 1950  MRN: [id number]    CHIEF COMPLAINT: Hemoptysis on anticoagulation post hip replacement    HISTORY OF PRESENT ILLNESS:    69 y.o. female patient admitted through the ED 2019 with worsening cough and new onset hemoptysis.  Hemoptysis that started that morning.  She reported she was diagnosed with bronchitis 2019 and that CT PE protocol was performed and negative for PE but did show some pneumonia for which her PCP put her on antibiotics.  Her cough continued and a chest x-ray was performed 2019 revealing some fluid for which she was started on a Medrol Dosepak.  She reported her cough continued and she developed acute hemoptysis on 2019.  She denied fever chills.  PMH significant for right hip replacement 2019 after which she was placed on Xarelto for thrombosis prophylaxis.  She denied prior history of blood clotting events or bleeding events.  Post admission she was intubated 2019 and extubated 2019.  Bronchoscopy revealed alveolar hemorrhage with suspected etiologies including moderate pulmonary hypertension as well as combination of Levaquin and Xarelto.Hemoglobin dropped and she was transfused PRBC.  Prophylactic anticoagulation was changed to Lovenox on 2019.     19  Hematology/Oncology was consulted for anticoagulation management.    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 Hanley    Subjective    Patient is awake and alert.  Has facemask on.  Reports that this is more comfortable in the nasal cannula.  Is able to rest well.  Had some episodes of hemoptysis he short of it on mild exertion.  Denies chest pain    ROS:  Review of Systems   Constitutional: Positive for fatigue. Negative for fever.   HENT: Positive for hearing loss. Negative for mouth sores, nosebleeds and trouble swallowing.    Eyes: Positive for itching. Negative for visual  disturbance.   Respiratory: Positive for shortness of breath. Negative for cough and wheezing.    Cardiovascular: Negative for chest pain.   Gastrointestinal: Negative for abdominal pain and blood in stool.   Endocrine: Negative for cold intolerance.   Genitourinary: Negative for dysuria and hematuria.   Musculoskeletal: Negative for joint swelling.   Skin: Negative for rash.   Allergic/Immunologic: Negative for environmental allergies.   Neurological: Negative for seizures and headaches.   Hematological: Does not bruise/bleed easily.   Psychiatric/Behavioral: Negative for confusion. The patient is not nervous/anxious ( improved).    All other systems reviewed and are negative.     MEDICATIONS:    Scheduled Meds:      acetaminophen 650 mg Oral Once   ALPRAZolam 0.25 mg Oral BID   aspirin 81 mg Oral Every Other Day   bumetanide 1 mg Intravenous Q12H   cholecalciferol 500 Units Oral Daily   diltiaZEM      diltiaZEM 20 mg Intravenous Once   diphenhydrAMINE 12.5 mg Intravenous Once   guaiFENesin 600 mg Oral Q12H   insulin lispro 0-24 Units Subcutaneous 4x Daily With Meals & Nightly   methylPREDNISolone sodium succinate 125 mg Intravenous Q6H   pantoprazole 40 mg Intravenous Q AM   potassium chloride 20 mEq Oral Q12H   potassium chloride 40 mEq Oral Once   potassium chloride 40 mEq Oral Once   riTUXimab (RITUXAN) IVPB 760 mg Intravenous Q7 Days   sodium chloride 1,000 mL Intravenous Once   sodium chloride 10 mL Intravenous Q12H   sodium chloride 3 mL Intravenous Q12H   sulfamethoxazole-trimethoprim 1 tablet Oral Once per day on Mon Wed Fri      Continuous Infusions:      diltiaZEM 5-15 mg/hr Last Rate: 5 mg/hr (08/01/19 1603)      PRN Meds:  albumin human  •  dextrose  •  dextrose  •  diphenhydrAMINE  •  famotidine  •  glucagon (human recombinant)  •  heparin (porcine)  •  HYDROcodone-homatropine  •  hydrocortisone sodium succinate  •  metoprolol tartrate  •  Morphine  •  Morphine  •  ondansetron  •  risperiDONE  •   "sodium chloride  •  sodium chloride  •  sodium chloride     ALLERGIES:  No Known Allergies    Objective    VITALS:   /86 (BP Location: Left arm, Patient Position: Lying)   Pulse (!) 131   Temp 97.2 °F (36.2 °C) (Axillary)   Resp 25   Ht 165.1 cm (65\")   Wt 91.6 kg (201 lb 15.1 oz)   SpO2 93%   BMI 33.60 kg/m²     PHYSICAL EXAM:  Physical Exam   Constitutional: She is oriented to person, place, and time. She appears well-developed and well-nourished. No distress. Nasal cannula in place.   obese   HENT:   Head: Normocephalic and atraumatic.   Nose: Nose normal.   Mouth/Throat: Oropharynx is clear and moist. No oropharyngeal exudate.   Right IJ HD catheter; BiPAP +; dental fillings   Eyes: Conjunctivae and EOM are normal. Pupils are equal, round, and reactive to light. Right eye exhibits no discharge. Left eye exhibits no discharge. No scleral icterus.   Neck: Normal range of motion. Neck supple. No thyromegaly present.   Cardiovascular: Regular rhythm, normal heart sounds and intact distal pulses. Tachycardia present. Exam reveals no gallop and no friction rub.   No murmur heard.  Monitor leads   Pulmonary/Chest: Effort normal and breath sounds normal. No stridor. No respiratory distress. She has no wheezes.   Abdominal: Soft. Bowel sounds are normal. She exhibits no mass. There is no tenderness. There is no rebound and no guarding.   Musculoskeletal: Normal range of motion. She exhibits edema ( 1+ pedal). She exhibits no tenderness or deformity.   [facility]+ LE edema   Neurological: She is alert and oriented to person, place, and time. She exhibits normal muscle tone. Coordination normal.   Skin: Skin is warm and dry. No rash noted. She is not diaphoretic. No erythema. No pallor.   Ecchymosis on extremities   Psychiatric: She has a normal mood and affect. Her behavior is normal.   Nursing note and vitals reviewed.        RECENT LABS:  Lab Results (last 24 hours)     Procedure " Component Value Units Date/Time    Blood Gas, Arterial [[id number]] Collected:  07/31/19 0450    Specimen:  Arterial Blood Updated:  08/01/19 1541     Site --     Comment: Unsatisfactory sample, test recollected        Giovanni's Test --     Comment: Unsatisfactory sample, test recollected        pH, Arterial -- pH units      Comment: Unsatisfactory sample, test recollected        pCO2, Arterial -- mm Hg      Comment: Unsatisfactory sample, test recollected        pO2, Arterial -- mm Hg      Comment: Unsatisfactory sample, test recollected        HCO3, Arterial -- mmol/L      Comment: Unsatisfactory sample, test recollected        Base Excess, Arterial -- mmol/L      Comment: Serial Number: [id number]:  [id number]        Barometric Pressure for Blood Gas -- mmHg      Comment: N/A        Modality --     Comment: Unsatisfactory sample, test recollected       POC Glucose Once [[id number]]  (Abnormal) Collected:  08/01/19 1322    Specimen:  Blood Updated:  08/01/19 1323     Glucose 191 mg/dL      Comment: Serial Number: [id number]:  [id number]       POC Glucose Once [[id number]]  (Abnormal) Collected:  08/01/19 1055    Specimen:  Blood Updated:  08/01/19 1058     Glucose 180 mg/dL      Comment: Serial Number: [id number]:  [id number]       POC Glucose Once [[id number]]  (Abnormal) Collected:  08/01/19 0608    Specimen:  Blood Updated:  08/01/19 0609     Glucose 148 mg/dL      Comment: Serial Number: [id number]:  [id number]       Comprehensive Metabolic Panel [[id number]]  (Abnormal) Collected:  08/01/19 0425    Specimen:  Blood Updated:  08/01/19 0537     Glucose 147 mg/dL      BUN 43 mg/dL      Creatinine 1.70 mg/dL      Sodium 137 mmol/L      Potassium 3.9 mmol/L      Chloride 100 mmol/L      CO2 25.0 mmol/L      Calcium 8.5 mg/dL      Total Protein 4.9 g/dL      Albumin 3.20 g/dL      ALT (SGPT) 30 U/L      AST (SGOT) 32 U/L      Alkaline Phosphatase 77 U/L      Total Bilirubin 2.2 mg/dL      eGFR Non   Amer 30 mL/min/1.73      Globulin 1.7 gm/dL      A/G Ratio 1.9 g/dL      BUN/Creatinine Ratio 25.3     Anion Gap 15.9 mmol/L     Magnesium [[id number]]  (Normal) Collected:  08/01/19 0425    Specimen:  Blood Updated:  08/01/19 0537     Magnesium 2.0 mg/dL     Phosphorus [[id number]]  (Normal) Collected:  08/01/19 0425    Specimen:  Blood Updated:  08/01/19 0537     Phosphorus 3.9 mg/dL     aPTT [[id number]]  (Abnormal) Collected:  08/01/19 0425    Specimen:  Blood Updated:  08/01/19 0454     PTT 22.7 seconds     Calcium, Ionized [[id number]]  (Normal) Collected:  08/01/19 0425    Specimen:  Blood Updated:  08/01/19 0447     Ionized Calcium 1.23 mmol/L     CBC Auto Differential [[id number]]  (Abnormal) Collected:  08/01/19 0425    Specimen:  Blood Updated:  08/01/19 0441     WBC 17.50 10*3/mm3      RBC 2.85 10*6/mm3      Hemoglobin 8.1 g/dL      Hematocrit 24.9 %      MCV 87.3 fL      MCH 28.5 pg      MCHC 32.7 g/dL      RDW 16.7 %      RDW-SD 50.3 fl      MPV 8.7 fL      Platelets 79 10*3/mm3      Neutrophil % 96.9 %      Lymphocyte % 0.8 %      Monocyte % 1.9 %      Eosinophil % 0.1 %      Basophil % 0.3 %      Neutrophils, Absolute 16.90 10*3/mm3      Lymphocytes, Absolute 0.10 10*3/mm3      Monocytes, Absolute 0.30 10*3/mm3      Eosinophils, Absolute 0.00 10*3/mm3      Basophils, Absolute 0.10 10*3/mm3      nRBC 0.0 /100 WBC     POC Glucose Once [[id number]]  (Abnormal) Collected:  08/01/19 0050    Specimen:  Blood Updated:  08/01/19 0051     Glucose 123 mg/dL      Comment: Serial Number: [id number]:  [id number]       POC Glucose Once [[id number]]  (Abnormal) Collected:  07/31/19 2034    Specimen:  Blood Updated:  07/31/19 2036     Glucose 245 mg/dL      Comment: Serial Number: [id number]:  [id number]       POC Glucose Once [[id number]]  (Abnormal) Collected:  07/31/19 1803    Specimen:  Blood Updated:  07/31/19 1804     Glucose 142 mg/dL      Comment: Serial Number: [id number]:  [id number]              PENDING RESULTS:  platelet antibodies.    IMAGING REVIEWED:  Xr Abdomen Kub    Result Date: 8/1/2019  Feeding tube tip terminating over the gastric body.  Electronically Signed By-Julien Garcia On:8/1/2019 12:23 PM This report was finalized on [id number] by  Julien Garcia, .    I have reviewed the patient's labs, imaging, reports, and other clinician documentation.    Assessment/Plan     ASSESSMENT:  1. Thrombosis risk s/p right hip replacement - hip replacement was 5/29/2019.  Xarelto held post admission due to alveolar hemorrhage.  No prior history of thrombosis and patient was active and walking prior to admission.  Now with prolonged ICU admission. SCD's ordered.  On ASA.   2. Alveolar hemorrhage/Respiratory failure/pulmonary HTN/DELLA - Xarelto d/c'd. ANCA vasculitis. On steroids and CTX. Extubated 7/16/19.  Per Pulmonary.  CXR stable.    3. Anemia - transfuse PNR Hgb < 7.5-1 unit 7/24.  Due to hematuria, alveolar hemorrhage and likely hemolysis.  Folate normal, vitamin B12 level borderline with a normal MMA. Hgb stable ranging 8-9.  4. 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  On PCP prophy-Bactrim. Pt not stable for renal biopsy per renal.   5. Acute thrombocytopenia- HIT negative.  No schistocytes.  Acute rise in bili-possibly due to itraconazole-d/c'd 7/25-bili stable. Checking rest of throbocytopenia workup-CMV/EBV IgM's neg. Hep B ag neg.  Minimal coagulopathy-INR 1.28. Improved.    6. Leukocytosis - related to steroids and reactive process.  7. Hypotensive -due to sepsis. ID d/maury itraconazole on 7/25/19. Repeat blood cx neg.  8. BALDOMERO vs CKD/electrolyte imbalances - on hemodialysis PRN.  9. HTN/HLD - followed by Dr. Santillan.  On ASA.  10. Anxiety-on xanax BID.   11. Histoplasma ag + in urine-per ID. Galactomannan was + but neg on repeat.  Itraconazole d/c'ed on  7/25/19      PLAN:  1. Continue daily CBC.  2. Transfuse pRBC PRN Hgb < 7.5    3. No further anticoagulation.  4. Await platelet antibodies.  5. Follow daily CBC thrombocytopenia persists, multifactorial continue present medications she is on Cytoxan also.          Note scribed by EMMA Eaton.  Electronically signed by EMMA Tran, 08/01/19, 3:25 PM.      Much of the above report is an electronic transcription//translation of the spoken language to printed text using Dragon Software. As such, the subtleties and finesse of the spoken language may permit erroneous, or at times, nonsensical words or phrases to be inadvertently transcribed; thus changes may be made at a later date to rectify these errors.

## 2019-08-01 NOTE — PLAN OF CARE
Problem: Patient Care Overview  Goal: Plan of Care Review  Outcome: Ongoing (interventions implemented as appropriate)   08/01/19 0653   Coping/Psychosocial   Plan of Care Reviewed With patient;spouse   Plan of Care Review   Progress no change   OTHER   Outcome Summary Pt on precision flow at 40L/100% before going to sleep. Bipap with fio2 of 80% worn throughout night. Pt experienced 2 episodes of anxiety throughout night, requesting medications.     Goal: Individualization and Mutuality  Outcome: Ongoing (interventions implemented as appropriate)    Goal: Discharge Needs Assessment  Outcome: Ongoing (interventions implemented as appropriate)      Problem: Sepsis/Septic Shock (Adult)  Goal: Signs and Symptoms of Listed Potential Problems Will be Absent, Minimized or Managed (Sepsis/Septic Shock)  Outcome: Ongoing (interventions implemented as appropriate)      Problem: Anxiety (Adult)  Goal: Reduction/Resolution  Outcome: Ongoing (interventions implemented as appropriate)      Problem: Pneumonia (Adult)  Goal: Signs and Symptoms of Listed Potential Problems Will be Absent, Minimized or Managed (Pneumonia)  Outcome: Ongoing (interventions implemented as appropriate)      Problem: NPPV/CPAP (Adult)  Goal: Signs and Symptoms of Listed Potential Problems Will be Absent, Minimized or Managed (NPPV/CPAP)  Outcome: Ongoing (interventions implemented as appropriate)

## 2019-08-01 NOTE — PLAN OF CARE
Problem: Patient Care Overview  Goal: Plan of Care Review  Outcome: Ongoing (interventions implemented as appropriate)   08/01/19 1150   OTHER   Outcome Summary Pt with decreased tolerance to activity this date. Pt on AVAPS with increased work of breathing with bed exercises. Determined not appropriate for sitting edge of bed this date. Will continue to follow daily for PT while IP at [facility].

## 2019-08-01 NOTE — PROGRESS NOTES
Infectious Diseases Progress Note      LOS: 19 days   Patient Care Team:  Rasta Spaulding MD as PCP - General (Family Medicine)  Khloe Spaulding MD as PCP - Claims Attributed    Chief Complaint: Shortness of breath    Subjective       The patient had no fever during the last 24 hours.   Now in ICU  CPAP        Review of Systems:   Review of Systems   Constitutional: Negative.    HENT: Negative.    Eyes: Negative.    Respiratory: Positive for shortness of breath.    Cardiovascular: Negative.    Gastrointestinal: Negative.    Genitourinary: Negative.    Musculoskeletal: Negative.    Skin: Negative.    Neurological: Negative.    Hematological: Negative.    Psychiatric/Behavioral: Negative.         Objective     Vital Signs  Temp:  [96.9 °F (36.1 °C)-98.5 °F (36.9 °C)] 96.9 °F (36.1 °C)  Heart Rate:  [103-139] 123  Resp:  [24-29] 24  BP: (135-178)/() 157/111  FiO2 (%):  [80 %-100 %] 100 %    Physical Exam:  Physical Exam   Constitutional: She appears well-developed and well-nourished.   HENT:   Head: Normocephalic.   Eyes: EOM are normal. Pupils are equal, round, and reactive to light.   Neck: Normal range of motion. Neck supple.   Cardiovascular: Normal rate, regular rhythm and normal heart sounds.   Pulmonary/Chest: She is in respiratory distress. She has no wheezes. She has rales.   Abdominal: Soft. Bowel sounds are normal. She exhibits no distension and no mass. There is no tenderness. There is no rebound and no guarding.   Musculoskeletal: She exhibits no edema or deformity.   Neurological: No cranial nerve deficit.   Skin: Skin is warm. No rash noted. No erythema.   Psychiatric: She has a normal mood and affect.   Nursing note and vitals reviewed.       Results Review:    I have reviewed all clinical data, test, lab, and imaging results.     Radiology  No Radiology Exams Resulted Within Past 24 Hours    Cardiology    Laboratory  Results from last 7 days   Lab Units 08/01/19  0425   WBC 10*3/mm3 17.50*    HEMOGLOBIN g/dL 8.1*   HEMATOCRIT % 24.9*   PLATELETS 10*3/mm3 79*     Results from last 7 days   Lab Units 08/01/19  0425   SODIUM mmol/L 137   POTASSIUM mmol/L 3.9   CHLORIDE mmol/L 100*   CO2 mmol/L 25.0   BUN mg/dL 43*   CREATININE mg/dL 1.70*   GLUCOSE mg/dL 147*   CALCIUM mg/dL 8.5*     Results from last 7 days   Lab Units 08/01/19  0425   SODIUM mmol/L 137   POTASSIUM mmol/L 3.9   CHLORIDE mmol/L 100*   CO2 mmol/L 25.0   BUN mg/dL 43*   CREATININE mg/dL 1.70*   GLUCOSE mg/dL 147*   CALCIUM mg/dL 8.5*                 Microbiology   Microbiology Results (last 10 days)     Procedure Component Value - Date/Time    Blood Culture - Blood, Arm, Left [[id number]] Collected:  07/25/19 1315    Lab Status:  Final result Specimen:  Blood from Arm, Left Updated:  07/30/19 1330     Blood Culture No growth at 5 days    Blood Culture - Blood, Blood, Central Line [[id number]] Collected:  07/25/19 1133    Lab Status:  Final result Specimen:  Blood, Central Line Updated:  07/30/19 1145     Blood Culture No growth at 5 days          Medication Review:       Schedule Meds    acetaminophen 650 mg Oral Once   ALPRAZolam 0.25 mg Oral BID   aspirin 81 mg Oral Every Other Day   bumetanide 1 mg Intravenous Q12H   cholecalciferol 500 Units Oral Daily   diphenhydrAMINE 12.5 mg Intravenous Once   guaiFENesin 600 mg Oral Q12H   insulin lispro 0-24 Units Subcutaneous 4x Daily With Meals & Nightly   ipratropium-albuterol 3 mL Nebulization 4x Daily - RT   methylPREDNISolone sodium succinate 125 mg Intravenous Q6H   metoprolol tartrate 5 mg Intravenous Q4H   pantoprazole 40 mg Intravenous Q AM   potassium chloride 20 mEq Oral Q12H   potassium chloride 40 mEq Oral Once   potassium chloride 40 mEq Oral Once   riTUXimab (RITUXAN) IVPB 760 mg Intravenous Q7 Days   sodium chloride 1,000 mL Intravenous Once   sodium chloride 10 mL Intravenous Q12H   sodium chloride 3 mL Intravenous Q12H   sulfamethoxazole-trimethoprim 1 tablet Oral Once per day on  Mon Wed Fri       Infusion Meds       PRN Meds  albumin human  •  dextrose  •  dextrose  •  diphenhydrAMINE  •  famotidine  •  glucagon (human recombinant)  •  heparin (porcine)  •  HYDROcodone-homatropine  •  hydrocortisone sodium succinate  •  Morphine  •  Morphine  •  ondansetron  •  risperiDONE  •  sodium chloride  •  sodium chloride  •  sodium chloride        Assessment/Plan       Antimicrobial Therapy   1.  P.o. bactrim     day  2.        day  3.      Day  4.      Day  5.      Day      Assessment     Alveolar pulmonary infiltrates associated with hemoptysis and dyspnea.  This most likely secondary to pulmonary hemorrhage and vasculitis     Degenerative joint disease.  Patient status post right total hip replacement on May 28, 2019.  Wound did not heal completely and there is mild dehiscence but superficial in the superior segment of the wound with no signs of infection     History of sleep apnea on BiPAP at home     History of mitral valve prolapse    Respiratory failure was on the ventilator with the patient was extubated on July 16, 2019.  Patient is currently on high flow oxygen    Reactive leukocytosis secondary to steroids.  Improved today    Diffuse bilateral alveolar infiltrates concerning for active vasculitis.    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  That patient does not have a typical presentation for histoplasmosis but since patient is immunocompromised and she will be started on immunosuppressive therapy for ANCA vasculitis.  Repeat urine for histoplasma antigen was undetectable.  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

## 2019-08-01 NOTE — PROGRESS NOTES
Daily Progress Note        Acute hypoxemic respiratory failure (CMS/HCC)    Severe sepsis (CMS/HCC)    Diffuse pulmonary alveolar hemorrhage      Assessment:  Acute respiratory failure: now on AVAP   Status post diffuse bilateral dense alveolar infiltrate with hemoptysis compatible with alveolar hemorrhage  Intubated 7/14/19,  Extubated 07/16/2019: no further hemoptysis, every time the steroid dose is decreased she gets worse  BALDOMERO/ARF  History of mitral valve prolapse,  2D echo confirmed mild-to-moderate pulmonary hypertension  EF 68%, Pulmonary edema with elevated BNP over 400: improving  HTN with tachycardia     Plan:  AVAP,  alternate with Precision high flow  Oxygen therapy and titration  SCDs, avoid anticoagulation   Steroids: slow wean  retuximab  , d/w renal  S/p HD  Bronchodilators  Glucose control   antibiotics discussed with infectious disease   BP control and HR control, required IV metoprolol, now resuming oral meds  Place DHT for feeding   D/W PATIENT AND HER FAMILY         LOS: 19 days     Subjective   She gets dyspnea when off the AVAP, no hemoptysis      Objective    stable on AVAP    Vital signs for last 24 hours:  Vitals:    08/01/19 0640 08/01/19 0646 08/01/19 0700 08/01/19 0725   BP:   163/92 163/92   BP Location:       Patient Position:   Lying    Pulse: 119 (!) 132  (!) 139   Resp: (!) 29 27 27    Temp:   97.3 °F (36.3 °C)    TempSrc:   Axillary    SpO2: (!) 86% 92%     Weight:       Height:           Intake/Output last 3 shifts:  I/O last 3 completed shifts:  In: 1799 [P.O.:300; I.V.:1399; IV Piggyback:100]  Out: 1780 [Urine:1780]  Intake/Output this shift:  No intake/output data recorded.      Radiology  Imaging Results (last 24 hours)     ** No results found for the last 24 hours. **          Labs:  Results from last 7 days   Lab Units 08/01/19  0425   WBC 10*3/mm3 17.50*   HEMOGLOBIN g/dL 8.1*   HEMATOCRIT % 24.9*   PLATELETS 10*3/mm3 79*     Results from last 7 days   Lab Units  08/01/19  0425   SODIUM mmol/L 137   POTASSIUM mmol/L 3.9   CHLORIDE mmol/L 100*   CO2 mmol/L 25.0   BUN mg/dL 43*   CREATININE mg/dL 1.70*   CALCIUM mg/dL 8.5*   BILIRUBIN mg/dL 2.2*   ALK PHOS U/L 77   ALT (SGPT) U/L 30   AST (SGOT) U/L 32   GLUCOSE mg/dL 147*     Results from last 7 days   Lab Units 07/31/19  0503   PH, ARTERIAL pH units 7.478*   PO2 ART mm Hg 66.2*   PCO2, ARTERIAL mm Hg 38.6   HCO3 ART mmol/L 28.6*     Results from last 7 days   Lab Units 08/01/19 0425 07/31/19  0431 07/30/19  0515   ALBUMIN g/dL 3.20* 3.30* 3.80             Results from last 7 days   Lab Units 08/01/19  0425   MAGNESIUM mg/dL 2.0     Results from last 7 days   Lab Units 08/01/19  0425 07/31/19  0431 07/30/19  0515  07/28/19  1637   INR   --   --   --   --  1.25*   APTT seconds 22.7* 23.7* 22.9*   < > 23.9*    < > = values in this interval not displayed.               Meds:   SCHEDULE    acetaminophen 650 mg Oral Once   ALPRAZolam 0.25 mg Oral BID   aspirin 81 mg Oral Every Other Day   bumetanide 1 mg Intravenous Q12H   cholecalciferol 500 Units Oral Daily   diphenhydrAMINE 12.5 mg Intravenous Once   guaiFENesin 600 mg Oral Q12H   insulin lispro 0-24 Units Subcutaneous 4x Daily With Meals & Nightly   ipratropium-albuterol 3 mL Nebulization 4x Daily - RT   methylPREDNISolone sodium succinate 125 mg Intravenous Q6H   pantoprazole 40 mg Intravenous Q AM   potassium chloride 20 mEq Oral Q12H   potassium chloride 40 mEq Oral Once   potassium chloride 40 mEq Oral Once   riTUXimab (RITUXAN) IVPB 760 mg Intravenous Q7 Days   sodium chloride 1,000 mL Intravenous Once   sodium chloride 10 mL Intravenous Q12H   sodium chloride 3 mL Intravenous Q12H   sulfamethoxazole-trimethoprim 1 tablet Oral Once per day on Mon Wed Fri     Infusions     PRNs  dextrose  •  dextrose  •  diphenhydrAMINE  •  famotidine  •  glucagon (human recombinant)  •  heparin (porcine)  •  HYDROcodone-homatropine  •  hydrocortisone sodium succinate  •  metoprolol  tartrate  •  Morphine  •  Morphine  •  ondansetron  •  risperiDONE  •  sodium chloride  •  sodium chloride  •  sodium chloride    Physical Exam:  Physical Exam   Constitutional: She is oriented to person, place, and time. She appears well-developed and well-nourished.   HENT:   Head: Normocephalic and atraumatic.   Mouth/Throat: Oropharynx is clear and moist.   Eyes: Conjunctivae are normal. Pupils are equal, round, and reactive to light.   Neck: Normal range of motion. Neck supple. No JVD present.   Cardiovascular: Normal rate, regular rhythm and normal heart sounds.   Pulmonary/Chest: Effort normal. She has no wheezes. She has rales.   Abdominal: Soft. Bowel sounds are normal. She exhibits no distension. There is no tenderness.   Musculoskeletal: She exhibits edema.   Neurological: She is alert and oriented to person, place, and time.   Skin: No rash noted.       ROS  Review of Systems   Constitutional: Positive for fatigue. Negative for fever.   Respiratory: Positive for cough and shortness of breath.    Cardiovascular: Negative for chest pain.   All other systems reviewed and are negative.          Patient is critically ill, critical care time greater than 60 minutes besides any procedure time  Total time spent with patient greater than: 60 Minutes

## 2019-08-01 NOTE — PROGRESS NOTES
"   LOS: 19 days   Patient Care Team:  Rasta Spaulding MD as PCP - General (Family Medicine)  Khloe Spaulding MD as PCP - Claims Attributed    Chief Complaint: shortness of breath    Subjective     Patient back on BiPAP this morning.  She reports having a rough night needing to pee a lot.  She seems a bit discouraged this morning and expresses she is unsure that she is making any progress.  She does have a slightly productive cough now.  Xanax does help some with the anxiety.  She takes morphine shot occasionally which also helps.      Shortness of Breath   The problem has been gradually improving. Associated symptoms include leg swelling and sputum production. Pertinent negatives include no chest pain or hemoptysis.   Cough   Associated symptoms include shortness of breath. Pertinent negatives include no chest pain or hemoptysis.       Subjective:  Symptoms:  She reports shortness of breath, cough and weakness.  No malaise, chest pain, chest pressure or anxiety.    Diet:  No nausea.    Pain:  She reports no pain.        History taken from: patient chart family    Objective     Vital Signs  Temp:  [97.8 °F (36.6 °C)-98.5 °F (36.9 °C)] 98.5 °F (36.9 °C)  Heart Rate:  [103-139] 139  Resp:  [22-29] 27  BP: (113-178)/(45-92) 163/92  FiO2 (%):  [80 %] 80 %    Objective:  General Appearance:  Comfortable, in no acute distress, not in pain and ill-appearing.    Vital signs: (most recent): Blood pressure 163/92, pulse (!) 139, temperature 98.5 °F (36.9 °C), resp. rate 27, height 165.1 cm (65\"), weight 91.6 kg (201 lb 15.1 oz), SpO2 92 %.    Output: Producing urine.    Lungs:  Normal effort and normal respiratory rate.  There are decreased breath sounds.    Heart: Tachycardia.  Regular rhythm.    Abdomen: Abdomen is soft.  Hypoactive bowel sounds.   There is no abdominal tenderness.     Extremities: There is dependent edema.    Pulses: Distal pulses are intact.    Neurological: Patient is alert and oriented to person, place " and time.    Skin:  Warm, dry and pale.              Results Review:      Lab Results (last 24 hours)     Procedure Component Value Units Date/Time    POC Glucose Once [[id number]]  (Abnormal) Collected:  08/01/19 0608    Specimen:  Blood Updated:  08/01/19 0609     Glucose 148 mg/dL      Comment: Serial Number: [id number]:  [id number]       Comprehensive Metabolic Panel [[id number]]  (Abnormal) Collected:  08/01/19 0425    Specimen:  Blood Updated:  08/01/19 0537     Glucose 147 mg/dL      BUN 43 mg/dL      Creatinine 1.70 mg/dL      Sodium 137 mmol/L      Potassium 3.9 mmol/L      Chloride 100 mmol/L      CO2 25.0 mmol/L      Calcium 8.5 mg/dL      Total Protein 4.9 g/dL      Albumin 3.20 g/dL      ALT (SGPT) 30 U/L      AST (SGOT) 32 U/L      Alkaline Phosphatase 77 U/L      Total Bilirubin 2.2 mg/dL      eGFR Non African Amer 30 mL/min/1.73      Globulin 1.7 gm/dL      A/G Ratio 1.9 g/dL      BUN/Creatinine Ratio 25.3     Anion Gap 15.9 mmol/L     Magnesium [[id number]]  (Normal) Collected:  08/01/19 0425    Specimen:  Blood Updated:  08/01/19 0537     Magnesium 2.0 mg/dL     Phosphorus [[id number]]  (Normal) Collected:  08/01/19 0425    Specimen:  Blood Updated:  08/01/19 0537     Phosphorus 3.9 mg/dL     aPTT [[id number]]  (Abnormal) Collected:  08/01/19 0425    Specimen:  Blood Updated:  08/01/19 0454     PTT 22.7 seconds     Calcium, Ionized [[id number]]  (Normal) Collected:  08/01/19 0425    Specimen:  Blood Updated:  08/01/19 0447     Ionized Calcium 1.23 mmol/L     CBC Auto Differential [[id number]]  (Abnormal) Collected:  08/01/19 0425    Specimen:  Blood Updated:  08/01/19 0441     WBC 17.50 10*3/mm3      RBC 2.85 10*6/mm3      Hemoglobin 8.1 g/dL      Hematocrit 24.9 %      MCV 87.3 fL      MCH 28.5 pg      MCHC 32.7 g/dL      RDW 16.7 %      RDW-SD 50.3 fl      MPV 8.7 fL      Platelets 79 10*3/mm3      Neutrophil % 96.9 %      Lymphocyte % 0.8 %      Monocyte % 1.9 %      Eosinophil % 0.1 %       Basophil % 0.3 %      Neutrophils, Absolute 16.90 10*3/mm3      Lymphocytes, Absolute 0.10 10*3/mm3      Monocytes, Absolute 0.30 10*3/mm3      Eosinophils, Absolute 0.00 10*3/mm3      Basophils, Absolute 0.10 10*3/mm3      nRBC 0.0 /100 WBC     POC Glucose Once [[id number]]  (Abnormal) Collected:  08/01/19 0050    Specimen:  Blood Updated:  08/01/19 0051     Glucose 123 mg/dL      Comment: Serial Number: [id number]:  [id number]       POC Glucose Once [[id number]]  (Abnormal) Collected:  07/31/19 2034    Specimen:  Blood Updated:  07/31/19 2036     Glucose 245 mg/dL      Comment: Serial Number: [id number]:  [id number]       POC Glucose Once [[id number]]  (Abnormal) Collected:  07/31/19 1803    Specimen:  Blood Updated:  07/31/19 1804     Glucose 142 mg/dL      Comment: Serial Number: [id number]:  [id number]       Methylmalonic Acid, Serum [[id number]] Collected:  07/28/19 1637    Specimen:  Blood Updated:  07/31/19 1308     Methylmalonic Acid 317 nmol/L      Disclaimer: Comment     Comment: This test was developed and its performance characteristics  determined by [facility]. It has not been cleared or approved  by the Food and Drug Administration.       Narrative:       Performed at:  [address]  [id number]  : Kg Prasad MD, Phone:  [phone]    POC Glucose Once [[id number]]  (Abnormal) Collected:  07/31/19 1120    Specimen:  Blood Updated:  07/31/19 1121     Glucose 270 mg/dL      Comment: Serial Number: [id number]:  [id number]            Imaging Results (last 24 hours)     ** No results found for the last 24 hours. **           I reviewed the patient's new clinical results.    Medication Review:    Hospital Medications (active)       Dose Frequency Start End    ALPRAZolam (XANAX) tablet 0.5 mg 0.5 mg 2 Times Daily 7/21/2019 7/31/2019    Sig - Route: Take 1 tablet by mouth 2 (Two) Times a Day. - Oral    amLODIPine (NORVASC) tablet 10 mg  "10 mg Every 24 Hours Scheduled 7/19/2019     Sig - Route: Take 2 tablets by mouth Daily. - Oral    amLODIPine (NORVASC) tablet 5 mg 5 mg Once 7/21/2019 7/21/2019    Sig - Route: Take 1 tablet by mouth 1 (One) Time. - Oral    aspirin chewable tablet 81 mg 81 mg Every Other Day 7/13/2019     Sig - Route: Chew 1 tablet Every Other Day. - Oral    bumetanide (BUMEX) injection 2 mg 2 mg Every 12 Hours 7/20/2019     Sig - Route: Infuse 8 mL into a venous catheter Every 12 (Twelve) Hours. - Intravenous    calcium gluconate 1 g in sodium chloride 0.9 % 100 mL IVPB 1 g As Needed 7/14/2019     Sig - Route: Infuse 1 g into a venous catheter As Needed (per protocol in admin instructions). - Intravenous    Linked Group 1:  \"And\" Linked Group Details        calcium gluconate 2 g/100 mL NS IVPB 2 g As Needed 7/14/2019     Sig - Route: Infuse 100 mL into a venous catheter As Needed (per protocol in admin instructions). - Intravenous    Linked Group 1:  \"And\" Linked Group Details        calcium gluconate 4 g in sodium chloride 0.9 % 250 mL IVPB 4 g Daily 7/19/2019 7/26/2019    Sig - Route: Infuse 4 g into a venous catheter Daily. - Intravenous    Notes to Pharmacy: To be used with apheresis tx    chlorhexidine (PERIDEX) 0.12 % solution 15 mL 15 mL Every 12 Hours Scheduled 7/14/2019     Sig - Route: Apply 15 mL to the mouth or throat Every 12 (Twelve) Hours. - Mouth/Throat    cholecalciferol (VITAMIN D3) tablet 500 Units 500 Units Daily 7/20/2019     Sig - Route: Take 0.5 tablets by mouth Daily. - Oral    citrate dextrose (ACD FORMULA A) solution 1,000 mL 1,000 mL Daily 7/19/2019 7/26/2019    Sig - Route: Infuse 1,000 mL into a venous catheter Daily. - Intravenous    cyclophosphamide capsule 50 mg 50 mg Daily 7/19/2019     Sig - Route: Take 1 capsule by mouth Daily. - Oral    dextrose (D50W) 25 g/ 50mL Intravenous Solution 25 g 25 g Every 15 Minutes PRN 7/22/2019     Sig - Route: Infuse 50 mL into a venous catheter Every 15 (Fifteen) " Minutes As Needed for Low Blood Sugar (Blood Sugar Less Than 70). - Intravenous    dextrose (GLUTOSE) oral gel 15 g 15 g Every 15 Minutes PRN 7/22/2019     Sig - Route: Take 15 application by mouth Every 15 (Fifteen) Minutes As Needed for Low Blood Sugar (Blood sugar less than 70). - Oral    glucagon (human recombinant) (GLUCAGEN DIAGNOSTIC) injection 1 mg 1 mg As Needed 7/22/2019     Sig - Route: Inject 1 mg under the skin into the appropriate area as directed As Needed for Low Blood Sugar (Blood Glucose Less Than 70). - Subcutaneous    guaiFENesin 200 MG/10ML solution 400 mg 400 mg Every 4 Hours Scheduled 7/14/2019     Sig - Route: 20 mL by Per G Tube route Every 4 (Four) Hours. - Per G Tube    heparin (porcine) injection 4,000 Units 4,000 Units As Needed 7/20/2019     Sig - Route: 4 mL by Intracatheter route As Needed (Post pheresis/ HD cath lumen lock.). - Intracatheter    HYDROcodone-homatropine (HYCODAN) 5-1.5 MG/5ML syrup 5 mL 5 mL Every 6 Hours PRN 7/13/2019     Sig - Route: Take 5 mL by mouth Every 6 (Six) Hours As Needed for Cough. - Oral    insulin lispro (humaLOG) injection 0-7 Units 0-7 Units Every 6 Hours 7/22/2019     Sig - Route: Inject 0-7 Units under the skin into the appropriate area as directed Every 6 (Six) Hours. - Subcutaneous    ipratropium-albuterol (DUO-NEB) nebulizer solution 3 mL 3 mL 4 Times Daily - RT 7/22/2019     Sig - Route: Take 3 mL by nebulization 4 (Four) Times a Day. - Nebulization    itraconazole (SPORANOX) capsule 200 mg 200 mg 3 Times Daily With Meals 7/19/2019 7/22/2019    Sig - Route: Take 2 capsules by mouth 3 (Three) Times a Day With Meals. - Oral    itraconazole (SPORANOX) capsule 200 mg 200 mg 2 Times Daily With Meals 7/22/2019 8/5/2019    Sig - Route: Take 2 capsules by mouth 2 (Two) Times a Day With Meals. - Oral    Notes to Pharmacy: Itraconazole dose will be 200 mg 3 times daily for the first 3 days followed by 200 twice daily    Magnesium Sulfate 2 gram / 50mL  "Infusion (GIVE X 3 BAGS TO EQUAL 6GM TOTAL DOSE) - Mg 1.1 - 1.5 mg/dl 2 g As Needed 7/14/2019     Sig - Route: Infuse 50 mL into a venous catheter As Needed (See Administration Instructions). - Intravenous    Linked Group 2:  \"Or\" Linked Group Details        Magnesium Sulfate 2 gram Bolus, followed by 8 gram infusion (total Mg dose 10 grams)- Mg less than or equal to 1mg/dL 2 g As Needed 7/14/2019     Sig - Route: Infuse 50 mL into a venous catheter As Needed (See Administration Instructions). - Intravenous    Linked Group 2:  \"Or\" Linked Group Details        Magnesium Sulfate 4 gram infusion- Mg 1.6-1.9 mg/dL 4 g As Needed 7/14/2019     Sig - Route: Infuse 100 mL into a venous catheter As Needed (See Administration Instructions). - Intravenous    Linked Group 2:  \"Or\" Linked Group Details        methylPREDNISolone sodium succinate (SOLU-Medrol) injection 125 mg 125 mg Every 6 Hours 7/18/2019     Sig - Route: Infuse 2 mL into a venous catheter Every 6 (Six) Hours. - Intravenous    metoprolol tartrate (LOPRESSOR) tablet 50 mg 50 mg Every 12 Hours Scheduled 7/19/2019     Sig - Route: Take 1 tablet by mouth Every 12 (Twelve) Hours. - Oral    pantoprazole (PROTONIX) injection 40 mg 40 mg Every 24 Hours Scheduled 7/14/2019     Sig - Route: Infuse 10 mL into a venous catheter Daily. - Intravenous    potassium chloride (K-DUR,KLOR-CON) CR tablet 40 mEq 40 mEq As Needed 7/17/2019     Sig - Route: Take 2 tablets by mouth As Needed (Potassium Replacement.  See Admin Instructions). - Oral    potassium chloride (K-DUR,KLOR-CON) CR tablet 40 mEq 40 mEq Every 2 Hours 7/22/2019 7/22/2019    Sig - Route: Take 2 tablets by mouth Every 2 (Two) Hours. - Oral    potassium chloride (KLOR-CON) packet 40 mEq 40 mEq As Needed 7/17/2019     Sig - Route: Take 40 mEq by mouth As Needed (Potassium Replacement, See Admin Instructions). - Oral    potassium phosphate 15 mmol in sodium chloride 0.9 % 100 mL infusion 15 mmol As Needed 7/14/2019     " "Sig - Route: Infuse 15 mmol into a venous catheter As Needed (Peripheral IV - Phosphorus 1.3 - 1.7 mg/dL). - Intravenous    Linked Group 3:  \"Or\" Linked Group Details        potassium phosphate 21 mmol in sodium chloride 0.9 % 250 mL infusion 21 mmol As Needed 7/14/2019     Sig - Route: Infuse 21 mmol into a venous catheter As Needed (Peripheral IV - Phosphorus Less Than 1.3 mg/dL). - Intravenous    Linked Group 3:  \"Or\" Linked Group Details        potassium phosphate 9 mmol in sodium chloride 0.9 % 100 mL infusion 9 mmol As Needed 7/14/2019     Sig - Route: Infuse 9 mmol into a venous catheter As Needed (Peripheral IV - Phosphorus 1.8 - 2.5 mg/dL). - Intravenous    Linked Group 3:  \"Or\" Linked Group Details        risperiDONE (risperDAL) tablet 0.5 mg 0.5 mg 2 Times Daily PRN 7/18/2019     Sig - Route: Take 2 tablets by mouth 2 (Two) Times a Day As Needed (anxiety). - Oral    sodium chloride 0.9 % bolus 1,000 mL 1,000 mL Once 7/19/2019     Sig - Route: Infuse 1,000 mL into a venous catheter 1 (One) Time. - Intravenous    sodium chloride 0.9 % flush 10 mL 10 mL Every 12 Hours Scheduled 7/14/2019     Sig - Route: Infuse 10 mL into a venous catheter Every 12 (Twelve) Hours. - Intravenous    sodium chloride 0.9 % flush 10 mL 10 mL As Needed 7/14/2019     Sig - Route: Infuse 10 mL into a venous catheter As Needed for Line Care (After Medication Administration). - Intravenous    sodium chloride 0.9 % flush 20 mL 20 mL As Needed 7/14/2019     Sig - Route: Infuse 20 mL into a venous catheter As Needed for Line Care (After Blood Draws or Blood Product Administration). - Intravenous    sodium chloride 0.9 % flush 3 mL 3 mL Every 12 Hours Scheduled 7/14/2019     Sig - Route: Infuse 3 mL into a venous catheter Every 12 (Twelve) Hours. - Intravenous    sodium chloride 0.9 % flush 3-10 mL 3-10 mL As Needed 7/14/2019     Sig - Route: Infuse 3-10 mL into a venous catheter As Needed for Line Care. - Intravenous    " "sulfamethoxazole-trimethoprim (BACTRIM,SEPTRA) 400-80 MG tablet 1 tablet 1 tablet 3 Times Weekly 7/22/2019 4/18/2022    Sig - Route: Take 1 tablet by mouth Once per day on Mon Wed Fri. - Oral    amLODIPine (NORVASC) tablet 5 mg (Discontinued) 5 mg Every 24 Hours Scheduled 7/21/2019 7/21/2019    Sig - Route: Take 1 tablet by mouth Daily. - Oral    ipratropium-albuterol (DUO-NEB) nebulizer solution 3 mL (Discontinued) 3 mL Every 6 Hours PRN 7/14/2019 7/22/2019    Sig - Route: Take 3 mL by nebulization Every 6 (Six) Hours As Needed for Wheezing or Shortness of Air. - Nebulization    ipratropium-albuterol (DUO-NEB) nebulizer solution 3 mL (Discontinued) 3 mL Every 6 Hours PRN 7/17/2019 7/22/2019    Sig - Route: Take 3 mL by nebulization Every 6 (Six) Hours As Needed for Shortness of Air. - Nebulization    racemic epinephrine (RACEPINEPHRINE) nebulizer solution 0.5 mL (Discontinued) 0.5 mL 3 Times Daily - RT 7/18/2019 7/22/2019    Sig - Route: Take 0.5 mL by nebulization 3 (Three) Times a Day. - Nebulization    Linked Group 4:  \"And\" Linked Group Details               Assessment/Plan       Acute hypoxemic respiratory failure (CMS/HCC)    Severe sepsis (CMS/HCC)    Diffuse pulmonary alveolar hemorrhage      Assessment:  (Acute hypoxic respiratory failure  ANCA+ vasculitis  Pulmonary alveolar hemorrhage secondary to the above  Status post rapid sequence intubation  BALDOMERO/ARF/ATN  Pulmonary hypertension  Pulmonary edema  HTN, essential, primary  Mitral valve prolapse  Histoplasmosis  Hypoventilation apnea syndrome  Dependency upon BiPAP therapy  Degenerative joint disease  Hypocalcemia  Hypokalemia  Hypernatremia  Moderate protein malnutrition  Left lower lobe community-acquired pneumonia  Hypoproliferative anemia  Insulin resistance  Sepsis secondary to the above        ).     Plan:   (Emotional support given.  Continue present approach with specialist recommendations and daily physical therapy.).       Sandi Leonard, " EMMA  08/01/19  7:38 AM

## 2019-08-02 NOTE — PROGRESS NOTES
Case Management Discharge Note                      Final Discharge Disposition Code: 41 -  in medical facility

## 2019-08-03 LAB
PLAT GP IA/IIA AB BLD QL IA: NEGATIVE
PLAT GP IB/IX AB BLD QL IA: POSITIVE
PLAT GP IIB/IIIA IGG BLD QL IA: POSITIVE

## 2019-08-03 NOTE — DISCHARGE SUMMARY
Date of Discharge:  8/3/2019       Discharge Diagnosis: cause of death: acute respiratory failure due to ANCA positive vasculitis    Presenting Problem/History of Present Illness  Active Hospital Problems    Diagnosis  POA   • **Acute hypoxemic respiratory failure (CMS/[facility]) [J96.01]  Yes   • Diffuse pulmonary alveolar hemorrhage [R04.2]  Unknown   • Severe sepsis (CMS/[facility]) [A41.9, R65.20]  Yes      Resolved Hospital Problems   No resolved problems to display.          Hospital Course  Patient is a 69 y.o. female presented with shortness of breath and productive cough with hemoptysis.  Bronchoscopy confirmed diffuse alveolar hemorrhage. 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  She had partial response to steroids and cyclophosphamide but everytime the steroids dose decreases she worseness to where she would need non-invasive ventilation.    Nephrology started the patient on plasmapheresis and few sessions of hemodialysis/UF, and eventually started her on Rituximab.  The patient decided to stop all active treatment and follow comfort care only and her  agreed with her decision knowing that she would probably  the same date.  Palliative measures were started and she  19 at 01:44    Procedures Performed    Procedure(s):  BRONCHOSCOPY with bronchoalveolar lavage  -------------------       Consults:   Consults     Date and Time Order Name Status Description    2019 1141 Inpatient Nephrology Consult Completed     2019 0721 Hematology & Oncology Inpatient Consult Completed     2019 1411 Inpatient Infectious Diseases Consult Completed     2019 0816 IP Consult to Pulmonology Completed             Condition on Discharge:       Vital Signs   n/a        History        Acute hypoxemic respiratory failure (CMS/[facility])    Severe sepsis (CMS/[facility])    Diffuse pulmonary alveolar hemorrhage      Past Medical History:   Diagnosis Date   • Angina pectoris (CMS/[facility]) 2015    • CPAP (continuous positive airway pressure) dependence    • History of left heart catheterization (LHC) 6/21/2019   • Hyperlipidemia    • Hypertension    • Osteoporosis 6/7/2017   • Sleep apnea    • Stable angina pectoris (CMS/HCC)    • Stenosis of left anterior descending (LAD) artery 1999    hx of small caliber LAD       Past Surgical History:   Procedure Laterality Date   • ATHRECTOMY ILIAC, FEMORAL, TIBIAL ARTERY Right 05/29/2019    -Dr Kelly-   • BRONCHOSCOPY N/A 7/14/2019    Procedure: BRONCHOSCOPY with bronchoalveolar lavage;  Surgeon: Caitlyn Chavez MD;  Location: [facility] ENDOSCOPY;  Service: Pulmonary   • CATARACT EXTRACTION  2017   • CHOLECYSTECTOMY     • HIP SURGERY  05/29/2019    right hip   • KNEE ARTHROSCOPY Right    • MITRAL VALVE REPAIR/REPLACEMENT      mitral valve prolapse   • OTHER SURGICAL HISTORY      laparascopic surgery   • TOTAL ABDOMINAL HYSTERECTOMY     • TUBAL ABDOMINAL LIGATION         Family History   Problem Relation Age of Onset   • Hypertension Mother    • Stroke Mother    • Stroke Sister    • Heart disease Sister    • Cancer Sister    • Heart disease Maternal Aunt    • Diabetes Other        Social History     Socioeconomic History   • Marital status:      Spouse name: Not on file   • Number of children: Not on file   • Years of education: Not on file   • Highest education level: Not on file   Tobacco Use   • Smoking status: Never Smoker   Substance and Sexual Activity   • Alcohol use: Yes   • Drug use: No           Radiology  Imaging Results (last 24 hours)     Procedure Component Value Units Date/Time    XR Abdomen KUB [[id number]] Collected:  08/01/19 1220     Updated:  08/01/19 1226    Narrative:       DATE OF EXAM:  8/1/2019 11:20 AM     PROCEDURE:  XR ABDOMEN KUB-     INDICATIONS:  insertion of nasogastric tube; A41.9-Sepsis, unspecified organism;  R65.20-Severe sepsis without septic shock; J81.0-Acute pulmonary edema;  J96.01-Acute respiratory failure with hypoxia      COMPARISON:  KUB dated 2019     TECHNIQUE:   Single radiographic view of the abdomen was obtained.     FINDINGS:  There is a feeding tube with tip terminating over the gastric body.  There is bibasilar interstitial and alveolar airspace disease. There is  a nonspecific nonobstructive bowel gas pattern.        Impression:       Feeding tube tip terminating over the gastric body.     Electronically Signed By-Julien Garcia On:2019 12:23 PM  This report was finalized on [id number] by  Julien Garcia, .          Labs:  Results from last 7 days   Lab Units 19  0425   WBC 10*3/mm3 17.50*   HEMOGLOBIN g/dL 8.1*   HEMATOCRIT % 24.9*   PLATELETS 10*3/mm3 79*     Results from last 7 days   Lab Units 19  0425   SODIUM mmol/L 137   POTASSIUM mmol/L 3.9   CHLORIDE mmol/L 100*   CO2 mmol/L 25.0   BUN mg/dL 43*   CREATININE mg/dL 1.70*   CALCIUM mg/dL 8.5*   BILIRUBIN mg/dL 2.2*   ALK PHOS U/L 77   ALT (SGPT) U/L 30   AST (SGOT) U/L 32   GLUCOSE mg/dL 147*     Results from last 7 days   Lab Units 19  0503   PH, ARTERIAL pH units 7.478*   PO2 ART mm Hg 66.2*   PCO2, ARTERIAL mm Hg 38.6   HCO3 ART mmol/L 28.6*     Results from last 7 days   Lab Units 19  0425 19  0431 19  0515   ALBUMIN g/dL 3.20* 3.30* 3.80             Results from last 7 days   Lab Units 19  0425   MAGNESIUM mg/dL 2.0     Results from last 7 days   Lab Units 19  0425 19  0431 19  0515  19  1637   INR   --   --   --   --  1.25*   APTT seconds 22.7* 23.7* 22.9*   < > 23.9*    < > = values in this interval not displayed.               Meds:   SCHEDULE    Infusions    No current facility-administered medications for this encounter.   PRNs      Discharge Disposition      Discharge Medications     Discharge Medications      ASK your doctor about these medications      Instructions Start Date   aspirin 81 MG chewable tablet   81 mg, Oral, Every Other Day      atenolol 25 MG  tablet  Commonly known as:  TENORMIN   TK 1 T PO  QAM      Cholecalciferol 1000 units chewable tablet   VITAMIN D3 GUMMIES ADULT CHEW      diphenhydrAMINE-acetaminophen  MG tablet per tablet  Commonly known as:  TYLENOL PM   1 tablet, Oral, Nightly PRN      FEOSOL BIFERA 28 MG tablet   Oral, Daily      hydrALAZINE 50 MG tablet  Commonly known as:  APRESOLINE   75 mg, Oral, 3 Times Daily      methylPREDNISolone 4 MG tablet  Commonly known as:  MEDROL (ANNMARIE)   TK PO UTD      MULTI-DAY PLUS MINERALS PO   Oral      oxyCODONE-acetaminophen 5-325 MG per tablet  Commonly known as:  PERCOCET   1 tablet, Oral, Every 6 Hours PRN             Discharge Diet:     Activity at Discharge:     Follow-up Appointments  No future appointments.  Additional Instructions for the Follow-ups that You Need to Schedule     Ambulatory Referral to Home Health   As directed      eval and treat    Order Comments:  eval and treat     Face to Face Visit Date:  7/19/2019    Follow-up Provider for Plan of Care?:  I treated the patient in an acute care facility and will not continue treatment after discharge.    Follow-up Provider:  JARED KELLEY [[id number]]    Reason/Clinical Findings:  respiratory failure . physical therapy    Describe mobility limitations that make leaving home difficult:  o2 requirements    Nursing/Therapeutic Services Requested:  Physical Therapy    Frequency:  1 Week 1               Test Results Pending at Discharge   Order Current Status    Legionella Species by Qual PCR - Lavage, Bronchus Collected (07/14/19 0919)    Pathology Consultation Collected (07/25/19 1868)           Leah Christianson  08/03/19  2:40 PM

## 2019-08-05 NOTE — PROGRESS NOTES
Case Management Discharge Note                      Final Discharge Disposition Code: 40 -  at home

## 2019-08-19 NOTE — H&P
History and Physical      Patient Care Team:  Rasta Spaulding MD as PCP - General (Family Medicine)  Ana Luisa Santillan MD as PCP - Claims Attributed     Chief complaint increasing shortness of breath, productive cough and hemoptysis        Subjective         Patient is a 69 y.o. female presents with the above complaints of increasing shortness of breath, productive cough and hemoptysis.  Patient had previously been in the hospital in the last month and underwent right hip arthroplasty.  She had rather uneventful postoperative course.  She was later seen by her family physician for increasing cough and shortness of breath approximately June 19.  She was started on levofloxacin at that time and given a 7-day course.  She started feeling a little better but then returned to the family practitioner's office for further problems breathing.  She has CAT scan done which ruled out underlying clot.  She was eventually found to have fluid in her lungs and was started last week on a steroid Dosepak.  She started feeling a little better until the day of her admission when she awoke with fever of about 101, she has sleep apnea so she also has a pulse oximeter at home and her oxygenation was down into the high 70s to low 80s.  Her cough was productive and eventually she started having hemoptysis.  She came to emergency department and had multiple underlying medical abnormal studies done including elevated BNP, she triggered the sepsis protocol, she had CT which was negative for pulmonary embolism but appeared that she had either recurrent /hospital-acquired pneumonia versus pleural effusions..  Symptoms are associated with fevers and chills, hemoptysis with productive cough and shortness of breath.  Symptoms are aggravated by activity.   Symptoms improve with rest, nebulizer treatments given in the emergency department. Severity moderate severe.     Review of Systems              All systems were reviewed and negative except  for: No amanda chest pain fortunately, no syncopal or near syncopal episodes.  Positives per history of present illness     History       Past Medical History:   Diagnosis Date   • Angina pectoris (CMS/HCC) 1/29/2015   • CPAP (continuous positive airway pressure) dependence     • History of left heart catheterization (LHC) 6/21/2019   • Hyperlipidemia     • Hypertension     • Osteoporosis 6/7/2017   • Sleep apnea     • Stable angina pectoris (CMS/HCC)     • Stenosis of left anterior descending (LAD) artery 1999     hx of small caliber LAD   Mitral valve prolapse  Status post right hip arthroplasty  Surgical History         Past Surgical History:   Procedure Laterality Date   • ATHRECTOMY ILIAC, FEMORAL, TIBIAL ARTERY Right 05/29/2019     -Dr Kelly-   • CATARACT EXTRACTION   2017   • CHOLECYSTECTOMY       • HIP SURGERY   05/29/2019     right hip   • KNEE ARTHROSCOPY Right     • MITRAL VALVE REPAIR/REPLACEMENT         mitral valve prolapse   • OTHER SURGICAL HISTORY         laparascopic surgery   • TOTAL ABDOMINAL HYSTERECTOMY       • TUBAL ABDOMINAL LIGATION                   Family History   Problem Relation Age of Onset   • Hypertension Mother     • Stroke Mother     • Stroke Sister     • Heart disease Sister     • Cancer Sister     • Heart disease Maternal Aunt     • Diabetes Other        Social History      Tobacco Use   • Smoking status: Never Smoker   Substance Use Topics   • Alcohol use: Yes   • Drug use: No      Allergies:  Patient has no known allergies.          Objective         Vital Signs  Temp:  [98 °F (36.7 °C)-101 °F (38.3 °C)] 98 °F (36.7 °C)  Heart Rate:  [] 70  Resp:  [22-27] 27  BP: (101-183)/(59-98) 101/89   Pulse oximetry: On 10 L via nasal tent 94 to 96%        Physical Exam:                 General Appearance:    Alert, cooperative, mild/moderate dyspnea at rest and with speaking, very pressured speech   Head:    Normocephalic, without obvious abnormality, atraumatic   Eyes:            Conjunctivae and sclerae normal, no   icterus, no pallor, corneas clear, PERRLA   Throat:   No oral lesions, no thrush, oral mucosa moist   Neck:   No adenopathy, supple, trachea midline, no thyromegaly, no   carotid bruit, no JVD   Lungs:    Slight accessory muscle use to breathe, on 10 L via nasal tent, bilateral bases with few coarse crackles and wheezes apices clear    Heart:    Regular rhythm and normal rate, normal S1 and S2, no            murmur, no gallop, no rub, no click   Chest Wall:    No abnormalities observed   Abdomen:     Normal bowel sounds, no masses, no organomegaly, soft        non-tender, non-distended, no guarding, no rebound                tenderness   Rectal:     Deferred   Extremities:   Moves all extremities well, no edema, no cyanosis, no             redness   Pulses:   Pulses palpable and equal bilaterally   Skin:   No bleeding, bruising or rash   Lymph nodes:   No palpable adenopathy   Neurologic:   Cranial nerves 2 - 12 grossly intact, sensation intact, DTR       present and equal bilaterally         Labs:  Lab Results (last 24 hours)      ** No results found for the last 24 hours. **             Radiology:  Xr Chest 2 View     Result Date: 7/5/2019  Interval development of small bilateral pleural effusions and bibasilar opacities favored to represent atelectasis.  Electronically Signed By-Sawyer Oneil On:7/5/2019 1:48 PM This report was finalized on [id number] by  Sawyer Oneil, .     Ct Chest Pulmonary Embolism With Contrast     Result Date: 7/13/2019  1.  Extensive bilateral pulmonary airspace opacities most likely represent edema (cardiogenic versus noncardiogenic/ARDS). Pneumonia is difficult to exclude. 2.  Moderate bilateral pleural effusions and moderate pulmonary interstitial edema. 3.  No evidence of pulmonary embolus. 4.  Increasing mediastinal lymphadenopathy, possibly reactive or neoplastic. Recommend CT chest follow-up in 3 months. Electronically signed by:  Milagros  LAURA Barker  7/13/2019 5:56 AM           Results Review:               I reviewed the patient's new clinical results.  I reviewed the patient's new imaging results and agree with the interpretation.          Assessment/Plan         Respiratory failure with hypoxemia  Sepsis  Febrile illness  Hemoptysis  Possible healthcare facility acquired pneumonia/failed outpatient treatment  Elevated BNP  Pleural effusions  Mitral valve prolapse  Obstructive sleep apnea  Small caliber left anterior descending coronary artery  Status post right hip arthroplasty  Angina pectoris due to atherosclerotic heart disease of the native coronary arteries  Hypertension  Peripheral neuropathy  Chronic kidney disease stage III due to hypertensive nephrosclerosis           Start IV fluid resuscitation, start empiric antibiotic therapy, respiratory support, DVT prophylaxis, peptic ulcer disease prophylaxis, pulmonary toilet, electrolyte management, correct underlying metabolic abnormalities.  , infectious disease and patient's usual cardiologist.  Of note, patient was given a single dose of Lasix in the emergency department and is diuresing well.  However she was given a fluid bolus for triggering sepsis protocol.  Admit to PCU with continuous oxygen and telemetry monitoring.            above H&P was done already by admiting MD , DR Hanley                       Routing History

## (undated) DEVICE — BAPTIST FLOYD BRONCHOSCOPY: Brand: MEDLINE INDUSTRIES, INC.

## (undated) DEVICE — BITEBLOCK ENDO W/STRAP 60F A/ LF DISP